# Patient Record
Sex: FEMALE | Race: WHITE | Employment: OTHER | URBAN - METROPOLITAN AREA
[De-identification: names, ages, dates, MRNs, and addresses within clinical notes are randomized per-mention and may not be internally consistent; named-entity substitution may affect disease eponyms.]

---

## 2018-03-22 ENCOUNTER — APPOINTMENT (OUTPATIENT)
Dept: GENERAL RADIOLOGY | Age: 53
DRG: 137 | End: 2018-03-22
Attending: INTERNAL MEDICINE
Payer: COMMERCIAL

## 2018-03-22 ENCOUNTER — HOSPITAL ENCOUNTER (INPATIENT)
Age: 53
LOS: 8 days | Discharge: HOME | DRG: 137 | End: 2018-03-30
Attending: INTERNAL MEDICINE | Admitting: INTERNAL MEDICINE
Payer: COMMERCIAL

## 2018-03-22 PROBLEM — J18.9 PNEUMONIA: Status: ACTIVE | Noted: 2018-03-22

## 2018-03-22 LAB
BACTERIA: ABNORMAL /HPF
BILIRUBIN URINE: ABNORMAL
BLOOD, URINE: NEGATIVE
CHOLESTEROL, TOTAL: 191 MG/DL (ref 0–199)
CLARITY: CLEAR
COLOR: YELLOW
GLUCOSE URINE: NEGATIVE MG/DL
HDLC SERPL-MCNC: 44 MG/DL
INFLUENZA A BY PCR: DETECTED
INFLUENZA B BY PCR: NOT DETECTED
KETONES, URINE: ABNORMAL MG/DL
LDL CHOLESTEROL CALCULATED: 108 MG/DL (ref 0–99)
LEUKOCYTE ESTERASE, URINE: NEGATIVE
MAGNESIUM: 2.2 MG/DL (ref 1.6–2.6)
NITRITE, URINE: NEGATIVE
PH UA: 5 (ref 5–9)
PHOSPHORUS: 2.9 MG/DL (ref 2.5–4.5)
PROTEIN UA: 30 MG/DL
RBC UA: ABNORMAL /HPF (ref 0–2)
SPECIFIC GRAVITY UA: 1.02 (ref 1–1.03)
STREP GRP A PCR: NEGATIVE
TRIGL SERPL-MCNC: 195 MG/DL (ref 0–149)
TROPONIN: <0.01 NG/ML (ref 0–0.03)
TSH SERPL DL<=0.05 MIU/L-ACNC: 2.8 UIU/ML (ref 0.27–4.2)
UROBILINOGEN, URINE: 0.2 E.U./DL
VLDLC SERPL CALC-MCNC: 39 MG/DL
WBC UA: ABNORMAL /HPF (ref 0–5)

## 2018-03-22 PROCEDURE — 87581 M.PNEUMON DNA AMP PROBE: CPT

## 2018-03-22 PROCEDURE — 94640 AIRWAY INHALATION TREATMENT: CPT

## 2018-03-22 PROCEDURE — 87880 STREP A ASSAY W/OPTIC: CPT

## 2018-03-22 PROCEDURE — 87503 INFLUENZA DNA AMP PROB ADDL: CPT

## 2018-03-22 PROCEDURE — 87501 INFLUENZA DNA AMP PROB 1+: CPT

## 2018-03-22 PROCEDURE — 80061 LIPID PANEL: CPT

## 2018-03-22 PROCEDURE — 6370000000 HC RX 637 (ALT 250 FOR IP): Performed by: INTERNAL MEDICINE

## 2018-03-22 PROCEDURE — 6360000002 HC RX W HCPCS: Performed by: INTERNAL MEDICINE

## 2018-03-22 PROCEDURE — 87040 BLOOD CULTURE FOR BACTERIA: CPT

## 2018-03-22 PROCEDURE — 84484 ASSAY OF TROPONIN QUANT: CPT

## 2018-03-22 PROCEDURE — 87486 CHLMYD PNEUM DNA AMP PROBE: CPT

## 2018-03-22 PROCEDURE — 83735 ASSAY OF MAGNESIUM: CPT

## 2018-03-22 PROCEDURE — 87450 HC DIRECT STREP B ANTIGEN: CPT

## 2018-03-22 PROCEDURE — 81001 URINALYSIS AUTO W/SCOPE: CPT

## 2018-03-22 PROCEDURE — 84100 ASSAY OF PHOSPHORUS: CPT

## 2018-03-22 PROCEDURE — 87502 INFLUENZA DNA AMP PROBE: CPT

## 2018-03-22 PROCEDURE — 83036 HEMOGLOBIN GLYCOSYLATED A1C: CPT

## 2018-03-22 PROCEDURE — 1200000000 HC SEMI PRIVATE

## 2018-03-22 PROCEDURE — 87798 DETECT AGENT NOS DNA AMP: CPT

## 2018-03-22 PROCEDURE — 36415 COLL VENOUS BLD VENIPUNCTURE: CPT

## 2018-03-22 PROCEDURE — 80053 COMPREHEN METABOLIC PANEL: CPT

## 2018-03-22 PROCEDURE — 71046 X-RAY EXAM CHEST 2 VIEWS: CPT

## 2018-03-22 PROCEDURE — 84443 ASSAY THYROID STIM HORMONE: CPT

## 2018-03-22 RX ORDER — ACETAMINOPHEN 325 MG/1
650 TABLET ORAL EVERY 4 HOURS PRN
Status: DISCONTINUED | OUTPATIENT
Start: 2018-03-22 | End: 2018-03-30 | Stop reason: HOSPADM

## 2018-03-22 RX ORDER — DEXTROSE MONOHYDRATE 25 G/50ML
12.5 INJECTION, SOLUTION INTRAVENOUS PRN
Status: DISCONTINUED | OUTPATIENT
Start: 2018-03-22 | End: 2018-03-30 | Stop reason: HOSPADM

## 2018-03-22 RX ORDER — BENZONATATE 100 MG/1
100 CAPSULE ORAL 3 TIMES DAILY PRN
Status: DISCONTINUED | OUTPATIENT
Start: 2018-03-22 | End: 2018-03-30 | Stop reason: HOSPADM

## 2018-03-22 RX ORDER — SODIUM CHLORIDE 0.9 % (FLUSH) 0.9 %
10 SYRINGE (ML) INJECTION PRN
Status: DISCONTINUED | OUTPATIENT
Start: 2018-03-22 | End: 2018-03-30 | Stop reason: HOSPADM

## 2018-03-22 RX ORDER — NICOTINE POLACRILEX 4 MG
15 LOZENGE BUCCAL PRN
Status: DISCONTINUED | OUTPATIENT
Start: 2018-03-22 | End: 2018-03-30 | Stop reason: HOSPADM

## 2018-03-22 RX ORDER — OLOPATADINE HYDROCHLORIDE 1 MG/ML
1 SOLUTION/ DROPS OPHTHALMIC 2 TIMES DAILY
COMMUNITY
End: 2020-09-23

## 2018-03-22 RX ORDER — AMOXICILLIN 500 MG/1
500 CAPSULE ORAL 3 TIMES DAILY
Status: ON HOLD | COMMUNITY
End: 2018-03-30 | Stop reason: HOSPADM

## 2018-03-22 RX ORDER — IPRATROPIUM BROMIDE AND ALBUTEROL SULFATE 2.5; .5 MG/3ML; MG/3ML
1 SOLUTION RESPIRATORY (INHALATION)
Status: DISCONTINUED | OUTPATIENT
Start: 2018-03-22 | End: 2018-03-30 | Stop reason: HOSPADM

## 2018-03-22 RX ORDER — DEXTROSE MONOHYDRATE 50 MG/ML
100 INJECTION, SOLUTION INTRAVENOUS PRN
Status: DISCONTINUED | OUTPATIENT
Start: 2018-03-22 | End: 2018-03-30 | Stop reason: HOSPADM

## 2018-03-22 RX ORDER — DIPHENHYDRAMINE HYDROCHLORIDE 50 MG/ML
25 INJECTION INTRAMUSCULAR; INTRAVENOUS PRN
Status: DISCONTINUED | OUTPATIENT
Start: 2018-03-22 | End: 2018-03-30 | Stop reason: HOSPADM

## 2018-03-22 RX ORDER — KETOCONAZOLE 20 MG/G
CREAM TOPICAL 2 TIMES DAILY
Status: ON HOLD | COMMUNITY
End: 2018-03-30 | Stop reason: HOSPADM

## 2018-03-22 RX ORDER — SODIUM CHLORIDE 0.9 % (FLUSH) 0.9 %
10 SYRINGE (ML) INJECTION EVERY 12 HOURS SCHEDULED
Status: DISCONTINUED | OUTPATIENT
Start: 2018-03-22 | End: 2018-03-30 | Stop reason: HOSPADM

## 2018-03-22 RX ORDER — OXYCODONE AND ACETAMINOPHEN 10; 325 MG/1; MG/1
1 TABLET ORAL ONCE
Status: COMPLETED | OUTPATIENT
Start: 2018-03-22 | End: 2018-03-23

## 2018-03-22 RX ORDER — BUDESONIDE 0.25 MG/2ML
250 INHALANT ORAL 2 TIMES DAILY
Status: DISCONTINUED | OUTPATIENT
Start: 2018-03-22 | End: 2018-03-30 | Stop reason: HOSPADM

## 2018-03-22 RX ADMIN — IPRATROPIUM BROMIDE AND ALBUTEROL SULFATE 1 AMPULE: .5; 3 SOLUTION RESPIRATORY (INHALATION) at 21:45

## 2018-03-22 ASSESSMENT — PAIN DESCRIPTION - FREQUENCY: FREQUENCY: INTERMITTENT

## 2018-03-22 ASSESSMENT — PAIN DESCRIPTION - DESCRIPTORS: DESCRIPTORS: ACHING;DISCOMFORT;TENDER

## 2018-03-22 ASSESSMENT — PAIN DESCRIPTION - ONSET: ONSET: ON-GOING

## 2018-03-22 ASSESSMENT — PAIN SCALES - GENERAL: PAINLEVEL_OUTOF10: 5

## 2018-03-22 ASSESSMENT — PAIN DESCRIPTION - PROGRESSION: CLINICAL_PROGRESSION: NOT CHANGED

## 2018-03-22 ASSESSMENT — PAIN DESCRIPTION - ORIENTATION: ORIENTATION: RIGHT

## 2018-03-22 ASSESSMENT — PAIN DESCRIPTION - LOCATION: LOCATION: ABDOMEN;ARM;LEG

## 2018-03-22 ASSESSMENT — PAIN DESCRIPTION - PAIN TYPE: TYPE: CHRONIC PAIN

## 2018-03-23 ENCOUNTER — APPOINTMENT (OUTPATIENT)
Dept: GENERAL RADIOLOGY | Age: 53
DRG: 137 | End: 2018-03-23
Attending: INTERNAL MEDICINE
Payer: COMMERCIAL

## 2018-03-23 ENCOUNTER — APPOINTMENT (OUTPATIENT)
Dept: CT IMAGING | Age: 53
DRG: 137 | End: 2018-03-23
Attending: INTERNAL MEDICINE
Payer: COMMERCIAL

## 2018-03-23 PROBLEM — J10.1 INFLUENZA A: Status: ACTIVE | Noted: 2018-03-23

## 2018-03-23 LAB
ALBUMIN SERPL-MCNC: 4.1 G/DL (ref 3.5–5.2)
ALP BLD-CCNC: 70 U/L (ref 35–104)
ALT SERPL-CCNC: 24 U/L (ref 0–32)
ANION GAP SERPL CALCULATED.3IONS-SCNC: 16 MMOL/L (ref 7–16)
AST SERPL-CCNC: 20 U/L (ref 0–31)
BASOPHILS ABSOLUTE: 0.06 E9/L (ref 0–0.2)
BASOPHILS RELATIVE PERCENT: 1.1 % (ref 0–2)
BILIRUB SERPL-MCNC: <0.2 MG/DL (ref 0–1.2)
BUN BLDV-MCNC: 15 MG/DL (ref 6–20)
CALCIUM SERPL-MCNC: 9.1 MG/DL (ref 8.6–10.2)
CHLORIDE BLD-SCNC: 100 MMOL/L (ref 98–107)
CO2: 26 MMOL/L (ref 22–29)
CREAT SERPL-MCNC: 0.8 MG/DL (ref 0.5–1)
EOSINOPHILS ABSOLUTE: 0.09 E9/L (ref 0.05–0.5)
EOSINOPHILS RELATIVE PERCENT: 1.7 % (ref 0–6)
FILM ARRAY ADENOVIRUS: ABNORMAL
FILM ARRAY BORDETELLA PERTUSSIS: ABNORMAL
FILM ARRAY CHLAMYDOPHILIA PNEUMONIAE: ABNORMAL
FILM ARRAY CORONAVIRUS 229E: ABNORMAL
FILM ARRAY CORONAVIRUS HKU1: ABNORMAL
FILM ARRAY CORONAVIRUS NL63: ABNORMAL
FILM ARRAY CORONAVIRUS OC43: ABNORMAL
FILM ARRAY INFLUENZA A VIRUS H3: ABNORMAL
FILM ARRAY INFLUENZA B: ABNORMAL
FILM ARRAY METAPNEUMOVIRUS: ABNORMAL
FILM ARRAY MYCOPLASMA PNEUMONIAE: ABNORMAL
FILM ARRAY PARAINFLUENZA VIRUS 1: ABNORMAL
FILM ARRAY PARAINFLUENZA VIRUS 2: ABNORMAL
FILM ARRAY PARAINFLUENZA VIRUS 3: ABNORMAL
FILM ARRAY PARAINFLUENZA VIRUS 4: ABNORMAL
FILM ARRAY RESPIRATORY SYNCITIAL VIRUS: ABNORMAL
FILM ARRAY RHINOVIRUS/ENTEROVIRUS: ABNORMAL
GFR AFRICAN AMERICAN: >60
GFR NON-AFRICAN AMERICAN: >60 ML/MIN/1.73
GLUCOSE BLD-MCNC: 95 MG/DL (ref 74–109)
HBA1C MFR BLD: 5.8 % (ref 4.8–5.9)
HCT VFR BLD CALC: 39.2 % (ref 34–48)
HEMOGLOBIN: 12.4 G/DL (ref 11.5–15.5)
IMMATURE GRANULOCYTES #: 0.02 E9/L
IMMATURE GRANULOCYTES %: 0.4 % (ref 0–5)
L. PNEUMOPHILA SEROGP 1 UR AG: NORMAL
LYMPHOCYTES ABSOLUTE: 2.57 E9/L (ref 1.5–4)
LYMPHOCYTES RELATIVE PERCENT: 47.7 % (ref 20–42)
MCH RBC QN AUTO: 30.4 PG (ref 26–35)
MCHC RBC AUTO-ENTMCNC: 31.6 % (ref 32–34.5)
MCV RBC AUTO: 96.1 FL (ref 80–99.9)
MONOCYTES ABSOLUTE: 0.58 E9/L (ref 0.1–0.95)
MONOCYTES RELATIVE PERCENT: 10.8 % (ref 2–12)
NEUTROPHILS ABSOLUTE: 2.07 E9/L (ref 1.8–7.3)
NEUTROPHILS RELATIVE PERCENT: 38.3 % (ref 43–80)
ORGANISM: ABNORMAL
PDW BLD-RTO: 14.6 FL (ref 11.5–15)
PLATELET # BLD: 215 E9/L (ref 130–450)
PMV BLD AUTO: 11.2 FL (ref 7–12)
POTASSIUM SERPL-SCNC: 3.8 MMOL/L (ref 3.5–5)
RBC # BLD: 4.08 E12/L (ref 3.5–5.5)
SODIUM BLD-SCNC: 142 MMOL/L (ref 132–146)
STREP PNEUMONIAE ANTIGEN, URINE: NORMAL
TOTAL PROTEIN: 7.6 G/DL (ref 6.4–8.3)
WBC # BLD: 5.4 E9/L (ref 4.5–11.5)

## 2018-03-23 PROCEDURE — 6360000002 HC RX W HCPCS: Performed by: INTERNAL MEDICINE

## 2018-03-23 PROCEDURE — 85025 COMPLETE CBC W/AUTO DIFF WBC: CPT

## 2018-03-23 PROCEDURE — 36415 COLL VENOUS BLD VENIPUNCTURE: CPT

## 2018-03-23 PROCEDURE — 2580000003 HC RX 258: Performed by: INTERNAL MEDICINE

## 2018-03-23 PROCEDURE — 6370000000 HC RX 637 (ALT 250 FOR IP): Performed by: INTERNAL MEDICINE

## 2018-03-23 PROCEDURE — 74230 X-RAY XM SWLNG FUNCJ C+: CPT

## 2018-03-23 PROCEDURE — 70491 CT SOFT TISSUE NECK W/DYE: CPT

## 2018-03-23 PROCEDURE — 2500000003 HC RX 250 WO HCPCS: Performed by: INTERNAL MEDICINE

## 2018-03-23 PROCEDURE — 92611 MOTION FLUOROSCOPY/SWALLOW: CPT | Performed by: SPEECH-LANGUAGE PATHOLOGIST

## 2018-03-23 PROCEDURE — 6360000004 HC RX CONTRAST MEDICATION: Performed by: RADIOLOGY

## 2018-03-23 PROCEDURE — 94640 AIRWAY INHALATION TREATMENT: CPT

## 2018-03-23 PROCEDURE — 1200000000 HC SEMI PRIVATE

## 2018-03-23 PROCEDURE — 92526 ORAL FUNCTION THERAPY: CPT | Performed by: SPEECH-LANGUAGE PATHOLOGIST

## 2018-03-23 RX ORDER — SODIUM CHLORIDE 9 MG/ML
INJECTION, SOLUTION INTRAVENOUS CONTINUOUS
Status: DISCONTINUED | OUTPATIENT
Start: 2018-03-23 | End: 2018-03-23

## 2018-03-23 RX ORDER — OSELTAMIVIR PHOSPHATE 75 MG/1
75 CAPSULE ORAL 2 TIMES DAILY
Status: COMPLETED | OUTPATIENT
Start: 2018-03-23 | End: 2018-03-28

## 2018-03-23 RX ORDER — OSELTAMIVIR PHOSPHATE 6 MG/ML
75 FOR SUSPENSION ORAL ONCE
Status: COMPLETED | OUTPATIENT
Start: 2018-03-23 | End: 2018-03-23

## 2018-03-23 RX ORDER — METOPROLOL TARTRATE 50 MG/1
50 TABLET, FILM COATED ORAL EVERY 8 HOURS
Status: DISCONTINUED | OUTPATIENT
Start: 2018-03-23 | End: 2018-03-30 | Stop reason: HOSPADM

## 2018-03-23 RX ORDER — ARIPIPRAZOLE 10 MG/1
20 TABLET ORAL DAILY
Status: DISCONTINUED | OUTPATIENT
Start: 2018-03-23 | End: 2018-03-30 | Stop reason: HOSPADM

## 2018-03-23 RX ORDER — CLONIDINE HYDROCHLORIDE 0.2 MG/1
0.2 TABLET ORAL NIGHTLY
Status: DISCONTINUED | OUTPATIENT
Start: 2018-03-23 | End: 2018-03-30 | Stop reason: HOSPADM

## 2018-03-23 RX ORDER — ALPRAZOLAM 1 MG/1
2 TABLET ORAL 3 TIMES DAILY PRN
Status: DISCONTINUED | OUTPATIENT
Start: 2018-03-23 | End: 2018-03-30 | Stop reason: HOSPADM

## 2018-03-23 RX ORDER — MONTELUKAST SODIUM 10 MG/1
10 TABLET ORAL NIGHTLY
Status: DISCONTINUED | OUTPATIENT
Start: 2018-03-23 | End: 2018-03-30 | Stop reason: HOSPADM

## 2018-03-23 RX ORDER — OXYCODONE AND ACETAMINOPHEN 10; 325 MG/1; MG/1
1 TABLET ORAL EVERY 6 HOURS PRN
Status: DISCONTINUED | OUTPATIENT
Start: 2018-03-23 | End: 2018-03-30 | Stop reason: HOSPADM

## 2018-03-23 RX ORDER — METHYLPREDNISOLONE SODIUM SUCCINATE 125 MG/2ML
80 INJECTION, POWDER, LYOPHILIZED, FOR SOLUTION INTRAMUSCULAR; INTRAVENOUS EVERY 6 HOURS
Status: DISCONTINUED | OUTPATIENT
Start: 2018-03-23 | End: 2018-03-26

## 2018-03-23 RX ORDER — SODIUM CHLORIDE AND POTASSIUM CHLORIDE .9; .15 G/100ML; G/100ML
SOLUTION INTRAVENOUS CONTINUOUS
Status: DISCONTINUED | OUTPATIENT
Start: 2018-03-23 | End: 2018-03-28

## 2018-03-23 RX ADMIN — POTASSIUM CHLORIDE AND SODIUM CHLORIDE: 900; 150 INJECTION, SOLUTION INTRAVENOUS at 09:35

## 2018-03-23 RX ADMIN — METHYLPREDNISOLONE SODIUM SUCCINATE 80 MG: 125 INJECTION, POWDER, FOR SOLUTION INTRAMUSCULAR; INTRAVENOUS at 21:29

## 2018-03-23 RX ADMIN — MONTELUKAST SODIUM 10 MG: 10 TABLET, FILM COATED ORAL at 01:24

## 2018-03-23 RX ADMIN — IPRATROPIUM BROMIDE AND ALBUTEROL SULFATE 1 AMPULE: .5; 3 SOLUTION RESPIRATORY (INHALATION) at 17:14

## 2018-03-23 RX ADMIN — OXYCODONE HYDROCHLORIDE AND ACETAMINOPHEN 1 TABLET: 10; 325 TABLET ORAL at 15:40

## 2018-03-23 RX ADMIN — OXYCODONE HYDROCHLORIDE AND ACETAMINOPHEN 1 TABLET: 10; 325 TABLET ORAL at 01:24

## 2018-03-23 RX ADMIN — TAZOBACTAM SODIUM AND PIPERACILLIN SODIUM 3.38 G: 375; 3 INJECTION, SOLUTION INTRAVENOUS at 09:35

## 2018-03-23 RX ADMIN — TAZOBACTAM SODIUM AND PIPERACILLIN SODIUM 3.38 G: 375; 3 INJECTION, SOLUTION INTRAVENOUS at 01:27

## 2018-03-23 RX ADMIN — VANCOMYCIN HYDROCHLORIDE 1500 MG: 1 INJECTION, POWDER, LYOPHILIZED, FOR SOLUTION INTRAVENOUS at 01:29

## 2018-03-23 RX ADMIN — BUDESONIDE 250 MCG: 0.25 SUSPENSION RESPIRATORY (INHALATION) at 06:17

## 2018-03-23 RX ADMIN — METHYLPREDNISOLONE SODIUM SUCCINATE 80 MG: 125 INJECTION, POWDER, FOR SOLUTION INTRAMUSCULAR; INTRAVENOUS at 09:34

## 2018-03-23 RX ADMIN — OSELTAMIVIR PHOSPHATE 75 MG: 6 POWDER, FOR SUSPENSION ORAL at 01:24

## 2018-03-23 RX ADMIN — METOPROLOL TARTRATE 50 MG: 25 TABLET ORAL at 01:24

## 2018-03-23 RX ADMIN — CLONIDINE HYDROCHLORIDE 0.2 MG: 0.2 TABLET ORAL at 01:24

## 2018-03-23 RX ADMIN — TAZOBACTAM SODIUM AND PIPERACILLIN SODIUM 3.38 G: 375; 3 INJECTION, SOLUTION INTRAVENOUS at 17:31

## 2018-03-23 RX ADMIN — Medication 10 ML: at 01:55

## 2018-03-23 RX ADMIN — VANCOMYCIN HYDROCHLORIDE 1250 MG: 10 INJECTION, POWDER, LYOPHILIZED, FOR SOLUTION INTRAVENOUS at 14:10

## 2018-03-23 RX ADMIN — SODIUM CHLORIDE: 9 INJECTION, SOLUTION INTRAVENOUS at 01:29

## 2018-03-23 RX ADMIN — BUSPIRONE HYDROCHLORIDE 15 MG: 10 TABLET ORAL at 20:23

## 2018-03-23 RX ADMIN — IPRATROPIUM BROMIDE AND ALBUTEROL SULFATE 1 AMPULE: .5; 3 SOLUTION RESPIRATORY (INHALATION) at 09:46

## 2018-03-23 RX ADMIN — BUDESONIDE 250 MCG: 0.25 SUSPENSION RESPIRATORY (INHALATION) at 17:15

## 2018-03-23 RX ADMIN — METHYLPREDNISOLONE SODIUM SUCCINATE 80 MG: 125 INJECTION, POWDER, FOR SOLUTION INTRAMUSCULAR; INTRAVENOUS at 15:39

## 2018-03-23 RX ADMIN — MONTELUKAST SODIUM 10 MG: 10 TABLET, FILM COATED ORAL at 20:23

## 2018-03-23 RX ADMIN — IOPAMIDOL 80 ML: 755 INJECTION, SOLUTION INTRAVENOUS at 13:29

## 2018-03-23 RX ADMIN — OSELTAMIVIR PHOSPHATE 75 MG: 75 CAPSULE ORAL at 17:31

## 2018-03-23 RX ADMIN — CLONIDINE HYDROCHLORIDE 0.2 MG: 0.2 TABLET ORAL at 20:23

## 2018-03-23 RX ADMIN — IPRATROPIUM BROMIDE AND ALBUTEROL SULFATE 1 AMPULE: .5; 3 SOLUTION RESPIRATORY (INHALATION) at 06:17

## 2018-03-23 RX ADMIN — VORTIOXETINE 20 MG: 10 TABLET, FILM COATED ORAL at 15:41

## 2018-03-23 RX ADMIN — ARIPIPRAZOLE 20 MG: 10 TABLET ORAL at 15:40

## 2018-03-23 RX ADMIN — METOPROLOL TARTRATE 50 MG: 25 TABLET ORAL at 15:40

## 2018-03-23 ASSESSMENT — PAIN SCALES - GENERAL
PAINLEVEL_OUTOF10: 0
PAINLEVEL_OUTOF10: 0
PAINLEVEL_OUTOF10: 8
PAINLEVEL_OUTOF10: 6

## 2018-03-23 ASSESSMENT — PAIN DESCRIPTION - DIRECTION: RADIATING_TOWARDS: RT SIDE OF BODY

## 2018-03-23 ASSESSMENT — PAIN DESCRIPTION - DESCRIPTORS
DESCRIPTORS: ACHING;CRAMPING;DISCOMFORT
DESCRIPTORS: ACHING;DULL;DISCOMFORT

## 2018-03-23 ASSESSMENT — PAIN DESCRIPTION - ORIENTATION
ORIENTATION: RIGHT;LEFT
ORIENTATION: RIGHT

## 2018-03-23 ASSESSMENT — PAIN DESCRIPTION - PAIN TYPE
TYPE: ACUTE PAIN
TYPE: CHRONIC PAIN

## 2018-03-23 ASSESSMENT — PAIN DESCRIPTION - LOCATION
LOCATION: ABDOMEN;BACK;CHEST
LOCATION: ABDOMEN;ARM;LEG

## 2018-03-23 ASSESSMENT — PAIN DESCRIPTION - ONSET
ONSET: ON-GOING
ONSET: ON-GOING

## 2018-03-23 ASSESSMENT — PAIN DESCRIPTION - FREQUENCY
FREQUENCY: CONTINUOUS
FREQUENCY: INTERMITTENT

## 2018-03-23 ASSESSMENT — PAIN DESCRIPTION - PROGRESSION
CLINICAL_PROGRESSION: NOT CHANGED
CLINICAL_PROGRESSION: NOT CHANGED

## 2018-03-24 LAB
ANION GAP SERPL CALCULATED.3IONS-SCNC: 12 MMOL/L (ref 7–16)
BUN BLDV-MCNC: 9 MG/DL (ref 6–20)
CALCIUM SERPL-MCNC: 9.1 MG/DL (ref 8.6–10.2)
CHLORIDE BLD-SCNC: 101 MMOL/L (ref 98–107)
CO2: 26 MMOL/L (ref 22–29)
CREAT SERPL-MCNC: 0.7 MG/DL (ref 0.5–1)
FOLATE: >20 NG/ML (ref 4.8–24.2)
GFR AFRICAN AMERICAN: >60
GFR NON-AFRICAN AMERICAN: >60 ML/MIN/1.73
GLUCOSE BLD-MCNC: 195 MG/DL (ref 74–109)
HCT VFR BLD CALC: 40.4 % (ref 34–48)
HEMOGLOBIN: 12.9 G/DL (ref 11.5–15.5)
MCH RBC QN AUTO: 29.8 PG (ref 26–35)
MCHC RBC AUTO-ENTMCNC: 31.9 % (ref 32–34.5)
MCV RBC AUTO: 93.3 FL (ref 80–99.9)
PDW BLD-RTO: 14.5 FL (ref 11.5–15)
PLATELET # BLD: 220 E9/L (ref 130–450)
PMV BLD AUTO: 11.3 FL (ref 7–12)
POTASSIUM SERPL-SCNC: 4.5 MMOL/L (ref 3.5–5)
RBC # BLD: 4.33 E12/L (ref 3.5–5.5)
SODIUM BLD-SCNC: 139 MMOL/L (ref 132–146)
T4 FREE: 1.38 NG/DL (ref 0.93–1.7)
TSH SERPL DL<=0.05 MIU/L-ACNC: 0.26 UIU/ML (ref 0.27–4.2)
VITAMIN B-12: 1072 PG/ML (ref 211–946)
WBC # BLD: 4.1 E9/L (ref 4.5–11.5)

## 2018-03-24 PROCEDURE — 80048 BASIC METABOLIC PNL TOTAL CA: CPT

## 2018-03-24 PROCEDURE — 85027 COMPLETE CBC AUTOMATED: CPT

## 2018-03-24 PROCEDURE — 6370000000 HC RX 637 (ALT 250 FOR IP): Performed by: INTERNAL MEDICINE

## 2018-03-24 PROCEDURE — 82746 ASSAY OF FOLIC ACID SERUM: CPT

## 2018-03-24 PROCEDURE — 36415 COLL VENOUS BLD VENIPUNCTURE: CPT

## 2018-03-24 PROCEDURE — 6360000002 HC RX W HCPCS: Performed by: INTERNAL MEDICINE

## 2018-03-24 PROCEDURE — 2580000003 HC RX 258: Performed by: INTERNAL MEDICINE

## 2018-03-24 PROCEDURE — 2500000003 HC RX 250 WO HCPCS: Performed by: INTERNAL MEDICINE

## 2018-03-24 PROCEDURE — 1200000000 HC SEMI PRIVATE

## 2018-03-24 PROCEDURE — 94640 AIRWAY INHALATION TREATMENT: CPT

## 2018-03-24 PROCEDURE — 84439 ASSAY OF FREE THYROXINE: CPT

## 2018-03-24 PROCEDURE — 84443 ASSAY THYROID STIM HORMONE: CPT

## 2018-03-24 PROCEDURE — 82607 VITAMIN B-12: CPT

## 2018-03-24 RX ORDER — POLYETHYLENE GLYCOL 3350 17 G/17G
17 POWDER, FOR SOLUTION ORAL DAILY
Status: DISCONTINUED | OUTPATIENT
Start: 2018-03-24 | End: 2018-03-30 | Stop reason: HOSPADM

## 2018-03-24 RX ADMIN — MONTELUKAST SODIUM 10 MG: 10 TABLET, FILM COATED ORAL at 20:49

## 2018-03-24 RX ADMIN — TAZOBACTAM SODIUM AND PIPERACILLIN SODIUM 3.38 G: 375; 3 INJECTION, SOLUTION INTRAVENOUS at 18:00

## 2018-03-24 RX ADMIN — POTASSIUM CHLORIDE AND SODIUM CHLORIDE: 900; 150 INJECTION, SOLUTION INTRAVENOUS at 07:00

## 2018-03-24 RX ADMIN — OXYCODONE HYDROCHLORIDE AND ACETAMINOPHEN 1 TABLET: 10; 325 TABLET ORAL at 14:28

## 2018-03-24 RX ADMIN — POLYETHYLENE GLYCOL 3350 17 G: 17 POWDER, FOR SOLUTION ORAL at 10:01

## 2018-03-24 RX ADMIN — METOPROLOL TARTRATE 50 MG: 25 TABLET ORAL at 16:16

## 2018-03-24 RX ADMIN — IPRATROPIUM BROMIDE AND ALBUTEROL SULFATE 1 AMPULE: .5; 3 SOLUTION RESPIRATORY (INHALATION) at 17:07

## 2018-03-24 RX ADMIN — METOPROLOL TARTRATE 50 MG: 25 TABLET ORAL at 00:57

## 2018-03-24 RX ADMIN — ARIPIPRAZOLE 20 MG: 10 TABLET ORAL at 09:42

## 2018-03-24 RX ADMIN — METHYLPREDNISOLONE SODIUM SUCCINATE 80 MG: 125 INJECTION, POWDER, FOR SOLUTION INTRAMUSCULAR; INTRAVENOUS at 16:16

## 2018-03-24 RX ADMIN — CLONIDINE HYDROCHLORIDE 0.2 MG: 0.2 TABLET ORAL at 20:49

## 2018-03-24 RX ADMIN — BUDESONIDE 250 MCG: 0.25 SUSPENSION RESPIRATORY (INHALATION) at 06:16

## 2018-03-24 RX ADMIN — METOPROLOL TARTRATE 50 MG: 25 TABLET ORAL at 09:42

## 2018-03-24 RX ADMIN — BUSPIRONE HYDROCHLORIDE 15 MG: 10 TABLET ORAL at 09:42

## 2018-03-24 RX ADMIN — IPRATROPIUM BROMIDE AND ALBUTEROL SULFATE 1 AMPULE: .5; 3 SOLUTION RESPIRATORY (INHALATION) at 13:30

## 2018-03-24 RX ADMIN — VORTIOXETINE 20 MG: 10 TABLET, FILM COATED ORAL at 09:42

## 2018-03-24 RX ADMIN — Medication 10 ML: at 22:03

## 2018-03-24 RX ADMIN — BENZONATATE 100 MG: 100 CAPSULE ORAL at 14:28

## 2018-03-24 RX ADMIN — IPRATROPIUM BROMIDE AND ALBUTEROL SULFATE 1 AMPULE: .5; 3 SOLUTION RESPIRATORY (INHALATION) at 06:15

## 2018-03-24 RX ADMIN — OXYCODONE HYDROCHLORIDE AND ACETAMINOPHEN 1 TABLET: 10; 325 TABLET ORAL at 20:54

## 2018-03-24 RX ADMIN — ALPRAZOLAM 2 MG: 1 TABLET ORAL at 20:54

## 2018-03-24 RX ADMIN — TAZOBACTAM SODIUM AND PIPERACILLIN SODIUM 3.38 G: 375; 3 INJECTION, SOLUTION INTRAVENOUS at 09:42

## 2018-03-24 RX ADMIN — METHYLPREDNISOLONE SODIUM SUCCINATE 80 MG: 125 INJECTION, POWDER, FOR SOLUTION INTRAMUSCULAR; INTRAVENOUS at 09:43

## 2018-03-24 RX ADMIN — METHYLPREDNISOLONE SODIUM SUCCINATE 80 MG: 125 INJECTION, POWDER, FOR SOLUTION INTRAMUSCULAR; INTRAVENOUS at 04:03

## 2018-03-24 RX ADMIN — TAZOBACTAM SODIUM AND PIPERACILLIN SODIUM 3.38 G: 375; 3 INJECTION, SOLUTION INTRAVENOUS at 01:55

## 2018-03-24 RX ADMIN — VANCOMYCIN HYDROCHLORIDE 1250 MG: 10 INJECTION, POWDER, LYOPHILIZED, FOR SOLUTION INTRAVENOUS at 14:25

## 2018-03-24 RX ADMIN — METHYLPREDNISOLONE SODIUM SUCCINATE 80 MG: 125 INJECTION, POWDER, FOR SOLUTION INTRAMUSCULAR; INTRAVENOUS at 20:50

## 2018-03-24 RX ADMIN — BUDESONIDE 250 MCG: 0.25 SUSPENSION RESPIRATORY (INHALATION) at 17:08

## 2018-03-24 RX ADMIN — BUSPIRONE HYDROCHLORIDE 15 MG: 10 TABLET ORAL at 20:49

## 2018-03-24 RX ADMIN — VANCOMYCIN HYDROCHLORIDE 1250 MG: 10 INJECTION, POWDER, LYOPHILIZED, FOR SOLUTION INTRAVENOUS at 00:57

## 2018-03-24 RX ADMIN — OSELTAMIVIR PHOSPHATE 75 MG: 75 CAPSULE ORAL at 18:59

## 2018-03-24 RX ADMIN — IPRATROPIUM BROMIDE AND ALBUTEROL SULFATE 1 AMPULE: .5; 3 SOLUTION RESPIRATORY (INHALATION) at 10:11

## 2018-03-24 RX ADMIN — BENZONATATE 100 MG: 100 CAPSULE ORAL at 20:54

## 2018-03-24 RX ADMIN — OSELTAMIVIR PHOSPHATE 75 MG: 75 CAPSULE ORAL at 08:11

## 2018-03-24 ASSESSMENT — PAIN SCALES - GENERAL
PAINLEVEL_OUTOF10: 0
PAINLEVEL_OUTOF10: 4
PAINLEVEL_OUTOF10: 7
PAINLEVEL_OUTOF10: 0

## 2018-03-25 ENCOUNTER — APPOINTMENT (OUTPATIENT)
Dept: CT IMAGING | Age: 53
DRG: 137 | End: 2018-03-25
Attending: INTERNAL MEDICINE
Payer: COMMERCIAL

## 2018-03-25 LAB
ANION GAP SERPL CALCULATED.3IONS-SCNC: 14 MMOL/L (ref 7–16)
BUN BLDV-MCNC: 10 MG/DL (ref 6–20)
CALCIUM SERPL-MCNC: 9 MG/DL (ref 8.6–10.2)
CHLORIDE BLD-SCNC: 102 MMOL/L (ref 98–107)
CO2: 25 MMOL/L (ref 22–29)
CREAT SERPL-MCNC: 0.7 MG/DL (ref 0.5–1)
GFR AFRICAN AMERICAN: >60
GFR NON-AFRICAN AMERICAN: >60 ML/MIN/1.73
GLUCOSE BLD-MCNC: 161 MG/DL (ref 74–109)
HCT VFR BLD CALC: 37.6 % (ref 34–48)
HEMOGLOBIN: 12.3 G/DL (ref 11.5–15.5)
MCH RBC QN AUTO: 30.4 PG (ref 26–35)
MCHC RBC AUTO-ENTMCNC: 32.7 % (ref 32–34.5)
MCV RBC AUTO: 92.8 FL (ref 80–99.9)
PDW BLD-RTO: 14.6 FL (ref 11.5–15)
PLATELET # BLD: 225 E9/L (ref 130–450)
PMV BLD AUTO: 11.6 FL (ref 7–12)
POTASSIUM SERPL-SCNC: 4.1 MMOL/L (ref 3.5–5)
RBC # BLD: 4.05 E12/L (ref 3.5–5.5)
SODIUM BLD-SCNC: 141 MMOL/L (ref 132–146)
VANCOMYCIN TROUGH: 18.6 MCG/ML (ref 5–16)
WBC # BLD: 11.8 E9/L (ref 4.5–11.5)

## 2018-03-25 PROCEDURE — 82785 ASSAY OF IGE: CPT

## 2018-03-25 PROCEDURE — 85027 COMPLETE CBC AUTOMATED: CPT

## 2018-03-25 PROCEDURE — 6370000000 HC RX 637 (ALT 250 FOR IP): Performed by: INTERNAL MEDICINE

## 2018-03-25 PROCEDURE — 94640 AIRWAY INHALATION TREATMENT: CPT

## 2018-03-25 PROCEDURE — 2580000003 HC RX 258: Performed by: INTERNAL MEDICINE

## 2018-03-25 PROCEDURE — 1200000000 HC SEMI PRIVATE

## 2018-03-25 PROCEDURE — 80202 ASSAY OF VANCOMYCIN: CPT

## 2018-03-25 PROCEDURE — 36415 COLL VENOUS BLD VENIPUNCTURE: CPT

## 2018-03-25 PROCEDURE — 80048 BASIC METABOLIC PNL TOTAL CA: CPT

## 2018-03-25 PROCEDURE — 2500000003 HC RX 250 WO HCPCS: Performed by: INTERNAL MEDICINE

## 2018-03-25 PROCEDURE — 97161 PT EVAL LOW COMPLEX 20 MIN: CPT | Performed by: PHYSICAL THERAPIST

## 2018-03-25 PROCEDURE — 71260 CT THORAX DX C+: CPT

## 2018-03-25 PROCEDURE — 86331 IMMUNODIFFUSION OUCHTERLONY: CPT

## 2018-03-25 PROCEDURE — 86606 ASPERGILLUS ANTIBODY: CPT

## 2018-03-25 PROCEDURE — 86003 ALLG SPEC IGE CRUDE XTRC EA: CPT

## 2018-03-25 PROCEDURE — 87070 CULTURE OTHR SPECIMN AEROBIC: CPT

## 2018-03-25 PROCEDURE — 97116 GAIT TRAINING THERAPY: CPT | Performed by: PHYSICAL THERAPIST

## 2018-03-25 PROCEDURE — 6360000002 HC RX W HCPCS: Performed by: INTERNAL MEDICINE

## 2018-03-25 PROCEDURE — 6360000004 HC RX CONTRAST MEDICATION: Performed by: RADIOLOGY

## 2018-03-25 PROCEDURE — 87205 SMEAR GRAM STAIN: CPT

## 2018-03-25 RX ORDER — CHLORDIAZEPOXIDE HYDROCHLORIDE AND CLIDINIUM BROMIDE 5; 2.5 MG/1; MG/1
1 CAPSULE ORAL
Status: DISCONTINUED | OUTPATIENT
Start: 2018-03-25 | End: 2018-03-30 | Stop reason: HOSPADM

## 2018-03-25 RX ORDER — PANTOPRAZOLE SODIUM 40 MG/1
40 TABLET, DELAYED RELEASE ORAL 2 TIMES DAILY
Status: DISCONTINUED | OUTPATIENT
Start: 2018-03-25 | End: 2018-03-30 | Stop reason: HOSPADM

## 2018-03-25 RX ORDER — DICYCLOMINE HYDROCHLORIDE 10 MG/1
20 CAPSULE ORAL EVERY 6 HOURS PRN
Status: DISCONTINUED | OUTPATIENT
Start: 2018-03-25 | End: 2018-03-30 | Stop reason: HOSPADM

## 2018-03-25 RX ADMIN — PANTOPRAZOLE SODIUM 40 MG: 40 TABLET, DELAYED RELEASE ORAL at 11:37

## 2018-03-25 RX ADMIN — BENZONATATE 100 MG: 100 CAPSULE ORAL at 21:03

## 2018-03-25 RX ADMIN — VANCOMYCIN HYDROCHLORIDE 1250 MG: 10 INJECTION, POWDER, LYOPHILIZED, FOR SOLUTION INTRAVENOUS at 01:19

## 2018-03-25 RX ADMIN — BUDESONIDE 250 MCG: 0.25 SUSPENSION RESPIRATORY (INHALATION) at 17:42

## 2018-03-25 RX ADMIN — METOPROLOL TARTRATE 50 MG: 25 TABLET ORAL at 16:24

## 2018-03-25 RX ADMIN — ARIPIPRAZOLE 20 MG: 10 TABLET ORAL at 11:36

## 2018-03-25 RX ADMIN — METHYLPREDNISOLONE SODIUM SUCCINATE 80 MG: 125 INJECTION, POWDER, FOR SOLUTION INTRAMUSCULAR; INTRAVENOUS at 20:57

## 2018-03-25 RX ADMIN — BENZONATATE 100 MG: 100 CAPSULE ORAL at 05:01

## 2018-03-25 RX ADMIN — TAZOBACTAM SODIUM AND PIPERACILLIN SODIUM 3.38 G: 375; 3 INJECTION, SOLUTION INTRAVENOUS at 18:22

## 2018-03-25 RX ADMIN — TAZOBACTAM SODIUM AND PIPERACILLIN SODIUM 3.38 G: 375; 3 INJECTION, SOLUTION INTRAVENOUS at 11:41

## 2018-03-25 RX ADMIN — METHYLPREDNISOLONE SODIUM SUCCINATE 80 MG: 125 INJECTION, POWDER, FOR SOLUTION INTRAMUSCULAR; INTRAVENOUS at 10:36

## 2018-03-25 RX ADMIN — IPRATROPIUM BROMIDE AND ALBUTEROL SULFATE 1 AMPULE: .5; 3 SOLUTION RESPIRATORY (INHALATION) at 17:42

## 2018-03-25 RX ADMIN — CLONIDINE HYDROCHLORIDE 0.2 MG: 0.2 TABLET ORAL at 20:57

## 2018-03-25 RX ADMIN — IPRATROPIUM BROMIDE AND ALBUTEROL SULFATE 1 AMPULE: .5; 3 SOLUTION RESPIRATORY (INHALATION) at 13:58

## 2018-03-25 RX ADMIN — VANCOMYCIN HYDROCHLORIDE 1250 MG: 10 INJECTION, POWDER, LYOPHILIZED, FOR SOLUTION INTRAVENOUS at 13:46

## 2018-03-25 RX ADMIN — IOPAMIDOL 80 ML: 755 INJECTION, SOLUTION INTRAVENOUS at 10:59

## 2018-03-25 RX ADMIN — OXYCODONE HYDROCHLORIDE AND ACETAMINOPHEN 1 TABLET: 10; 325 TABLET ORAL at 02:56

## 2018-03-25 RX ADMIN — OXYCODONE HYDROCHLORIDE AND ACETAMINOPHEN 1 TABLET: 10; 325 TABLET ORAL at 13:49

## 2018-03-25 RX ADMIN — BUDESONIDE 250 MCG: 0.25 SUSPENSION RESPIRATORY (INHALATION) at 07:24

## 2018-03-25 RX ADMIN — ALPRAZOLAM 2 MG: 1 TABLET ORAL at 21:03

## 2018-03-25 RX ADMIN — BUSPIRONE HYDROCHLORIDE 15 MG: 10 TABLET ORAL at 20:57

## 2018-03-25 RX ADMIN — PANTOPRAZOLE SODIUM 40 MG: 40 TABLET, DELAYED RELEASE ORAL at 20:57

## 2018-03-25 RX ADMIN — METHYLPREDNISOLONE SODIUM SUCCINATE 80 MG: 125 INJECTION, POWDER, FOR SOLUTION INTRAMUSCULAR; INTRAVENOUS at 16:24

## 2018-03-25 RX ADMIN — IPRATROPIUM BROMIDE AND ALBUTEROL SULFATE 1 AMPULE: .5; 3 SOLUTION RESPIRATORY (INHALATION) at 10:34

## 2018-03-25 RX ADMIN — OSELTAMIVIR PHOSPHATE 75 MG: 75 CAPSULE ORAL at 07:01

## 2018-03-25 RX ADMIN — MAGNESIUM HYDROXIDE 30 ML: 400 SUSPENSION ORAL at 03:00

## 2018-03-25 RX ADMIN — BUSPIRONE HYDROCHLORIDE 15 MG: 10 TABLET ORAL at 11:34

## 2018-03-25 RX ADMIN — TAZOBACTAM SODIUM AND PIPERACILLIN SODIUM 3.38 G: 375; 3 INJECTION, SOLUTION INTRAVENOUS at 01:30

## 2018-03-25 RX ADMIN — MONTELUKAST SODIUM 10 MG: 10 TABLET, FILM COATED ORAL at 20:57

## 2018-03-25 RX ADMIN — VORTIOXETINE 20 MG: 10 TABLET, FILM COATED ORAL at 11:33

## 2018-03-25 RX ADMIN — METOPROLOL TARTRATE 50 MG: 25 TABLET ORAL at 11:34

## 2018-03-25 RX ADMIN — IPRATROPIUM BROMIDE AND ALBUTEROL SULFATE 1 AMPULE: .5; 3 SOLUTION RESPIRATORY (INHALATION) at 07:23

## 2018-03-25 RX ADMIN — OSELTAMIVIR PHOSPHATE 75 MG: 75 CAPSULE ORAL at 18:22

## 2018-03-25 RX ADMIN — METHYLPREDNISOLONE SODIUM SUCCINATE 80 MG: 125 INJECTION, POWDER, FOR SOLUTION INTRAMUSCULAR; INTRAVENOUS at 05:00

## 2018-03-25 RX ADMIN — METOPROLOL TARTRATE 50 MG: 25 TABLET ORAL at 00:13

## 2018-03-25 RX ADMIN — OXYCODONE HYDROCHLORIDE AND ACETAMINOPHEN 1 TABLET: 10; 325 TABLET ORAL at 21:03

## 2018-03-25 ASSESSMENT — PAIN SCALES - GENERAL
PAINLEVEL_OUTOF10: 7
PAINLEVEL_OUTOF10: 4
PAINLEVEL_OUTOF10: 7
PAINLEVEL_OUTOF10: 6
PAINLEVEL_OUTOF10: 2
PAINLEVEL_OUTOF10: 0
PAINLEVEL_OUTOF10: 0

## 2018-03-26 ENCOUNTER — ANESTHESIA EVENT (OUTPATIENT)
Dept: ENDOSCOPY | Age: 53
DRG: 137 | End: 2018-03-26
Payer: COMMERCIAL

## 2018-03-26 LAB
ANION GAP SERPL CALCULATED.3IONS-SCNC: 17 MMOL/L (ref 7–16)
BUN BLDV-MCNC: 13 MG/DL (ref 6–20)
CALCIUM SERPL-MCNC: 8.8 MG/DL (ref 8.6–10.2)
CHLORIDE BLD-SCNC: 99 MMOL/L (ref 98–107)
CO2: 26 MMOL/L (ref 22–29)
CREAT SERPL-MCNC: 0.7 MG/DL (ref 0.5–1)
GFR AFRICAN AMERICAN: >60
GFR NON-AFRICAN AMERICAN: >60 ML/MIN/1.73
GLUCOSE BLD-MCNC: 150 MG/DL (ref 74–109)
HCT VFR BLD CALC: 38.2 % (ref 34–48)
HEMOGLOBIN: 12.3 G/DL (ref 11.5–15.5)
MCH RBC QN AUTO: 30.1 PG (ref 26–35)
MCHC RBC AUTO-ENTMCNC: 32.2 % (ref 32–34.5)
MCV RBC AUTO: 93.4 FL (ref 80–99.9)
PDW BLD-RTO: 14.6 FL (ref 11.5–15)
PLATELET # BLD: 220 E9/L (ref 130–450)
PMV BLD AUTO: 11.6 FL (ref 7–12)
POTASSIUM SERPL-SCNC: 4.1 MMOL/L (ref 3.5–5)
RBC # BLD: 4.09 E12/L (ref 3.5–5.5)
SODIUM BLD-SCNC: 142 MMOL/L (ref 132–146)
WBC # BLD: 10.8 E9/L (ref 4.5–11.5)

## 2018-03-26 PROCEDURE — 36415 COLL VENOUS BLD VENIPUNCTURE: CPT

## 2018-03-26 PROCEDURE — 2580000003 HC RX 258: Performed by: INTERNAL MEDICINE

## 2018-03-26 PROCEDURE — 2500000003 HC RX 250 WO HCPCS: Performed by: INTERNAL MEDICINE

## 2018-03-26 PROCEDURE — 94150 VITAL CAPACITY TEST: CPT

## 2018-03-26 PROCEDURE — 6360000002 HC RX W HCPCS: Performed by: INTERNAL MEDICINE

## 2018-03-26 PROCEDURE — 6370000000 HC RX 637 (ALT 250 FOR IP): Performed by: INTERNAL MEDICINE

## 2018-03-26 PROCEDURE — G8988 SELF CARE GOAL STATUS: HCPCS

## 2018-03-26 PROCEDURE — G8987 SELF CARE CURRENT STATUS: HCPCS

## 2018-03-26 PROCEDURE — 85027 COMPLETE CBC AUTOMATED: CPT

## 2018-03-26 PROCEDURE — 80048 BASIC METABOLIC PNL TOTAL CA: CPT

## 2018-03-26 PROCEDURE — 94640 AIRWAY INHALATION TREATMENT: CPT

## 2018-03-26 PROCEDURE — 97165 OT EVAL LOW COMPLEX 30 MIN: CPT

## 2018-03-26 PROCEDURE — 97530 THERAPEUTIC ACTIVITIES: CPT

## 2018-03-26 PROCEDURE — 1200000000 HC SEMI PRIVATE

## 2018-03-26 PROCEDURE — 97116 GAIT TRAINING THERAPY: CPT

## 2018-03-26 PROCEDURE — 2700000000 HC OXYGEN THERAPY PER DAY

## 2018-03-26 RX ORDER — KETOROLAC TROMETHAMINE 30 MG/ML
30 INJECTION, SOLUTION INTRAMUSCULAR; INTRAVENOUS EVERY 6 HOURS PRN
Status: DISCONTINUED | OUTPATIENT
Start: 2018-03-26 | End: 2018-03-30 | Stop reason: HOSPADM

## 2018-03-26 RX ORDER — METHYLPREDNISOLONE SODIUM SUCCINATE 125 MG/2ML
60 INJECTION, POWDER, LYOPHILIZED, FOR SOLUTION INTRAMUSCULAR; INTRAVENOUS EVERY 6 HOURS
Status: DISCONTINUED | OUTPATIENT
Start: 2018-03-26 | End: 2018-03-28

## 2018-03-26 RX ORDER — AMOXICILLIN AND CLAVULANATE POTASSIUM 875; 125 MG/1; MG/1
1 TABLET, FILM COATED ORAL EVERY 12 HOURS SCHEDULED
Status: DISCONTINUED | OUTPATIENT
Start: 2018-03-26 | End: 2018-03-30 | Stop reason: HOSPADM

## 2018-03-26 RX ADMIN — BENZONATATE 100 MG: 100 CAPSULE ORAL at 16:25

## 2018-03-26 RX ADMIN — ACETAMINOPHEN 650 MG: 325 TABLET, FILM COATED ORAL at 00:00

## 2018-03-26 RX ADMIN — Medication 10 ML: at 21:56

## 2018-03-26 RX ADMIN — DICYCLOMINE HYDROCHLORIDE 20 MG: 10 CAPSULE ORAL at 14:38

## 2018-03-26 RX ADMIN — IPRATROPIUM BROMIDE AND ALBUTEROL SULFATE 1 AMPULE: .5; 3 SOLUTION RESPIRATORY (INHALATION) at 05:25

## 2018-03-26 RX ADMIN — POTASSIUM CHLORIDE AND SODIUM CHLORIDE: 900; 150 INJECTION, SOLUTION INTRAVENOUS at 00:00

## 2018-03-26 RX ADMIN — AMOXICILLIN AND CLAVULANATE POTASSIUM 1 TABLET: 875; 125 TABLET, FILM COATED ORAL at 11:04

## 2018-03-26 RX ADMIN — METHYLPREDNISOLONE SODIUM SUCCINATE 60 MG: 125 INJECTION, POWDER, LYOPHILIZED, FOR SOLUTION INTRAMUSCULAR; INTRAVENOUS at 09:41

## 2018-03-26 RX ADMIN — METHYLPREDNISOLONE SODIUM SUCCINATE 60 MG: 125 INJECTION, POWDER, LYOPHILIZED, FOR SOLUTION INTRAMUSCULAR; INTRAVENOUS at 16:26

## 2018-03-26 RX ADMIN — PANTOPRAZOLE SODIUM 40 MG: 40 TABLET, DELAYED RELEASE ORAL at 20:55

## 2018-03-26 RX ADMIN — OXYCODONE HYDROCHLORIDE AND ACETAMINOPHEN 1 TABLET: 10; 325 TABLET ORAL at 20:55

## 2018-03-26 RX ADMIN — BUDESONIDE 250 MCG: 0.25 SUSPENSION RESPIRATORY (INHALATION) at 05:26

## 2018-03-26 RX ADMIN — CLONIDINE HYDROCHLORIDE 0.2 MG: 0.2 TABLET ORAL at 20:56

## 2018-03-26 RX ADMIN — OSELTAMIVIR PHOSPHATE 75 MG: 75 CAPSULE ORAL at 05:18

## 2018-03-26 RX ADMIN — ARIPIPRAZOLE 20 MG: 10 TABLET ORAL at 08:06

## 2018-03-26 RX ADMIN — IPRATROPIUM BROMIDE AND ALBUTEROL SULFATE 1 AMPULE: .5; 3 SOLUTION RESPIRATORY (INHALATION) at 09:09

## 2018-03-26 RX ADMIN — PANTOPRAZOLE SODIUM 40 MG: 40 TABLET, DELAYED RELEASE ORAL at 08:07

## 2018-03-26 RX ADMIN — VORTIOXETINE 20 MG: 10 TABLET, FILM COATED ORAL at 08:06

## 2018-03-26 RX ADMIN — BUSPIRONE HYDROCHLORIDE 15 MG: 10 TABLET ORAL at 08:07

## 2018-03-26 RX ADMIN — METOPROLOL TARTRATE 50 MG: 25 TABLET ORAL at 16:34

## 2018-03-26 RX ADMIN — MONTELUKAST SODIUM 10 MG: 10 TABLET, FILM COATED ORAL at 20:55

## 2018-03-26 RX ADMIN — DICYCLOMINE HYDROCHLORIDE 20 MG: 10 CAPSULE ORAL at 04:19

## 2018-03-26 RX ADMIN — Medication 10 ML: at 08:07

## 2018-03-26 RX ADMIN — OSELTAMIVIR PHOSPHATE 75 MG: 75 CAPSULE ORAL at 18:01

## 2018-03-26 RX ADMIN — POTASSIUM CHLORIDE AND SODIUM CHLORIDE: 900; 150 INJECTION, SOLUTION INTRAVENOUS at 16:30

## 2018-03-26 RX ADMIN — AMOXICILLIN AND CLAVULANATE POTASSIUM 1 TABLET: 875; 125 TABLET, FILM COATED ORAL at 20:55

## 2018-03-26 RX ADMIN — METHYLPREDNISOLONE SODIUM SUCCINATE 60 MG: 125 INJECTION, POWDER, LYOPHILIZED, FOR SOLUTION INTRAMUSCULAR; INTRAVENOUS at 21:55

## 2018-03-26 RX ADMIN — BUSPIRONE HYDROCHLORIDE 15 MG: 10 TABLET ORAL at 20:55

## 2018-03-26 RX ADMIN — METHYLPREDNISOLONE SODIUM SUCCINATE 80 MG: 125 INJECTION, POWDER, FOR SOLUTION INTRAMUSCULAR; INTRAVENOUS at 04:04

## 2018-03-26 RX ADMIN — BUDESONIDE 250 MCG: 0.25 SUSPENSION RESPIRATORY (INHALATION) at 18:34

## 2018-03-26 RX ADMIN — IPRATROPIUM BROMIDE AND ALBUTEROL SULFATE 1 AMPULE: .5; 3 SOLUTION RESPIRATORY (INHALATION) at 13:05

## 2018-03-26 RX ADMIN — OXYCODONE HYDROCHLORIDE AND ACETAMINOPHEN 1 TABLET: 10; 325 TABLET ORAL at 14:36

## 2018-03-26 RX ADMIN — IPRATROPIUM BROMIDE AND ALBUTEROL SULFATE 1 AMPULE: .5; 3 SOLUTION RESPIRATORY (INHALATION) at 18:33

## 2018-03-26 RX ADMIN — KETOROLAC TROMETHAMINE 30 MG: 30 INJECTION, SOLUTION INTRAMUSCULAR at 09:40

## 2018-03-26 RX ADMIN — VANCOMYCIN HYDROCHLORIDE 1250 MG: 10 INJECTION, POWDER, LYOPHILIZED, FOR SOLUTION INTRAVENOUS at 01:30

## 2018-03-26 RX ADMIN — TAZOBACTAM SODIUM AND PIPERACILLIN SODIUM 3.38 G: 375; 3 INJECTION, SOLUTION INTRAVENOUS at 01:41

## 2018-03-26 ASSESSMENT — PAIN SCALES - GENERAL
PAINLEVEL_OUTOF10: 7
PAINLEVEL_OUTOF10: 6
PAINLEVEL_OUTOF10: 7
PAINLEVEL_OUTOF10: 4
PAINLEVEL_OUTOF10: 0

## 2018-03-26 ASSESSMENT — PAIN DESCRIPTION - ORIENTATION
ORIENTATION: INNER
ORIENTATION: RIGHT

## 2018-03-26 ASSESSMENT — PAIN DESCRIPTION - DESCRIPTORS
DESCRIPTORS: ACHING;DISCOMFORT;CONSTANT
DESCRIPTORS: ACHING;DISCOMFORT;SORE
DESCRIPTORS: DISCOMFORT;HEAVINESS

## 2018-03-26 ASSESSMENT — PAIN DESCRIPTION - LOCATION
LOCATION: CHEST;HEAD;BACK
LOCATION: ABDOMEN;LEG
LOCATION: GENERALIZED

## 2018-03-26 ASSESSMENT — PAIN DESCRIPTION - PROGRESSION
CLINICAL_PROGRESSION: NOT CHANGED

## 2018-03-26 ASSESSMENT — PAIN DESCRIPTION - DIRECTION: RADIATING_TOWARDS: RT SIDE OF BODY

## 2018-03-26 ASSESSMENT — PAIN DESCRIPTION - FREQUENCY
FREQUENCY: INTERMITTENT
FREQUENCY: INTERMITTENT

## 2018-03-26 ASSESSMENT — PAIN DESCRIPTION - PAIN TYPE
TYPE: ACUTE PAIN;CHRONIC PAIN
TYPE: CHRONIC PAIN
TYPE: ACUTE PAIN

## 2018-03-26 ASSESSMENT — PAIN DESCRIPTION - ONSET
ONSET: ON-GOING
ONSET: ON-GOING

## 2018-03-26 ASSESSMENT — ENCOUNTER SYMPTOMS: SHORTNESS OF BREATH: 1

## 2018-03-26 NOTE — ANESTHESIA PRE PROCEDURE
daily.    Historical Provider, MD   diclofenac sodium 1 % GEL Apply 2 g topically 2 times daily.     Historical Provider, MD       Current medications:    Current Facility-Administered Medications   Medication Dose Route Frequency Provider Last Rate Last Dose    ketorolac (TORADOL) injection 30 mg  30 mg Intravenous Q6H PRN Calvin Demetrius, DO   30 mg at 03/26/18 0940    methylPREDNISolone sodium (SOLU-MEDROL) injection 60 mg  60 mg Intravenous Q6H Calvin Demetrius, DO   60 mg at 03/26/18 1626    amoxicillin-clavulanate (AUGMENTIN) 875-125 MG per tablet 1 tablet  1 tablet Oral 2 times per day Calvin Demetrius, DO   1 tablet at 03/26/18 1104    chlordiazePOXIDE-clidinium (LIBRAX) 5-2.5 MG per capsule 1 capsule  1 capsule Oral TID WC Calvin Demetrius, DO        dicyclomine (BENTYL) capsule 20 mg  20 mg Oral Q6H PRN Calvin Demetrius, DO   20 mg at 03/26/18 1438    hyoscyamine (LEVSIN/SL) sublingual tablet 125 mcg  125 mcg Sublingual Q6H PRN Calvin Demetrius, DO        pantoprazole (PROTONIX) tablet 40 mg  40 mg Oral BID Calvin Demetrius, DO   40 mg at 03/26/18 3231    polyethylene glycol (GLYCOLAX) packet 17 g  17 g Oral Daily Calvin Demetrius, DO   17 g at 03/24/18 1001    cloNIDine (CATAPRES) tablet 0.2 mg  0.2 mg Oral Nightly Leellen Diaz, DO   0.2 mg at 03/25/18 2057    montelukast (SINGULAIR) tablet 10 mg  10 mg Oral Nightly Leellen Diaz, DO   10 mg at 03/25/18 2057    metoprolol tartrate (LOPRESSOR) tablet 50 mg  50 mg Oral Q8H Ismail Arias, DO   50 mg at 03/26/18 1634    oseltamivir (TAMIFLU) capsule 75 mg  75 mg Oral BID Calvin Demetrius, DO   75 mg at 03/26/18 0518    0.9% NaCl with KCl 20 mEq infusion   Intravenous Continuous Calvin Demetrius, DO 75 mL/hr at 03/26/18 1630      ALPRAZolam Euna Larry) tablet 2 mg  2 mg Oral TID PRN Calvin Demetrius, DO   2 mg at 03/25/18 2103    ARIPiprazole (ABILIFY) tablet 20 mg  20 mg Oral Daily Calvin Demetrius, DO   20 mg at 03/26/18 0806    busPIRone (BUSPAR) tablet 15 mg  15 mg Oral BID Calvin Demetrius, DO   15 mg at 03/26/18 0807    VORTIoxetine (TRINTELLIX) tablet 20 mg  20 mg Oral Daily Cleta Junes, DO   20 mg at 03/26/18 0806    oxyCODONE-acetaminophen (PERCOCET)  MG per tablet 1 tablet  1 tablet Oral Q6H PRN Cleta Junes, DO   1 tablet at 03/26/18 1436    sodium chloride flush 0.9 % injection 10 mL  10 mL Intravenous 2 times per day Myrene Sark, DO   10 mL at 03/26/18 0807    sodium chloride flush 0.9 % injection 10 mL  10 mL Intravenous PRN Myrene Sark, DO        acetaminophen (TYLENOL) tablet 650 mg  650 mg Oral Q4H PRN Myrene Sark, DO   650 mg at 03/26/18 0000    magnesium hydroxide (MILK OF MAGNESIA) 400 MG/5ML suspension 30 mL  30 mL Oral Daily PRN Myrene Sark, DO   30 mL at 03/25/18 0300    glucose (GLUTOSE) 40 % oral gel 15 g  15 g Oral PRN Myrene Sark, DO        dextrose 50 % solution 12.5 g  12.5 g Intravenous PRN Myrene Sark, DO        glucagon (rDNA) injection 1 mg  1 mg Intramuscular PRN Myrene Sark, DO        dextrose 5 % solution  100 mL/hr Intravenous PRN Myrene Sark, DO        ipratropium-albuterol (DUONEB) nebulizer solution 1 ampule  1 ampule Inhalation Q4H While awake Myrene Sark, DO   1 ampule at 03/26/18 1305    benzonatate (TESSALON) capsule 100 mg  100 mg Oral TID PRN Myrene Sark, DO   100 mg at 03/26/18 1625    diphenhydrAMINE (BENADRYL) injection 25 mg  25 mg Intravenous PRN Myrene Sark, DO        budesonide (PULMICORT) nebulizer suspension 250 mcg  250 mcg Nebulization BID Myrene Sark, DO   250 mcg at 03/26/18 6924       Allergies:     Allergies   Allergen Reactions    Cymbalta [Duloxetine Hcl] Other (See Comments)     anger    Dexlansoprazole Nausea Only    Diovan [Valsartan] Other (See Comments)     Cough, sore throat    Ditropan [Oxybutynin] Other (See Comments)     hoarsness    Lunesta [Eszopiclone] Other (See Comments)     Bad dreams    Molds & Smuts     Norvasc [Amlodipine Besylate] Other (See Comments)     cough    Sular [Nisoldipine Er]

## 2018-03-27 ENCOUNTER — ANESTHESIA (OUTPATIENT)
Dept: ENDOSCOPY | Age: 53
DRG: 137 | End: 2018-03-27
Payer: COMMERCIAL

## 2018-03-27 VITALS
OXYGEN SATURATION: 98 % | DIASTOLIC BLOOD PRESSURE: 90 MMHG | SYSTOLIC BLOOD PRESSURE: 139 MMHG | RESPIRATION RATE: 18 BRPM

## 2018-03-27 LAB
ANION GAP SERPL CALCULATED.3IONS-SCNC: 16 MMOL/L (ref 7–16)
BUN BLDV-MCNC: 17 MG/DL (ref 6–20)
CALCIUM SERPL-MCNC: 8.4 MG/DL (ref 8.6–10.2)
CHLORIDE BLD-SCNC: 104 MMOL/L (ref 98–107)
CO2: 21 MMOL/L (ref 22–29)
CREAT SERPL-MCNC: 0.7 MG/DL (ref 0.5–1)
CULTURE, RESPIRATORY: NORMAL
GFR AFRICAN AMERICAN: >60
GFR NON-AFRICAN AMERICAN: >60 ML/MIN/1.73
GLUCOSE BLD-MCNC: 158 MG/DL (ref 74–109)
HCT VFR BLD CALC: 39.3 % (ref 34–48)
HEMOGLOBIN: 12.4 G/DL (ref 11.5–15.5)
MCH RBC QN AUTO: 29.8 PG (ref 26–35)
MCHC RBC AUTO-ENTMCNC: 31.6 % (ref 32–34.5)
MCV RBC AUTO: 94.5 FL (ref 80–99.9)
METER GLUCOSE: 128 MG/DL (ref 70–110)
PDW BLD-RTO: 14.5 FL (ref 11.5–15)
PLATELET # BLD: 146 E9/L (ref 130–450)
PMV BLD AUTO: 12.2 FL (ref 7–12)
POTASSIUM SERPL-SCNC: 4.5 MMOL/L (ref 3.5–5)
RBC # BLD: 4.16 E12/L (ref 3.5–5.5)
SMEAR, RESPIRATORY: NORMAL
SODIUM BLD-SCNC: 141 MMOL/L (ref 132–146)
WBC # BLD: 10.9 E9/L (ref 4.5–11.5)

## 2018-03-27 PROCEDURE — 3700000000 HC ANESTHESIA ATTENDED CARE: Performed by: INTERNAL MEDICINE

## 2018-03-27 PROCEDURE — 94150 VITAL CAPACITY TEST: CPT

## 2018-03-27 PROCEDURE — 82962 GLUCOSE BLOOD TEST: CPT

## 2018-03-27 PROCEDURE — 80048 BASIC METABOLIC PNL TOTAL CA: CPT

## 2018-03-27 PROCEDURE — 6370000000 HC RX 637 (ALT 250 FOR IP): Performed by: INTERNAL MEDICINE

## 2018-03-27 PROCEDURE — 36415 COLL VENOUS BLD VENIPUNCTURE: CPT

## 2018-03-27 PROCEDURE — 2709999900 HC NON-CHARGEABLE SUPPLY: Performed by: INTERNAL MEDICINE

## 2018-03-27 PROCEDURE — 0B948ZZ DRAINAGE OF RIGHT UPPER LOBE BRONCHUS, VIA NATURAL OR ARTIFICIAL OPENING ENDOSCOPIC: ICD-10-PCS | Performed by: INTERNAL MEDICINE

## 2018-03-27 PROCEDURE — 3700000001 HC ADD 15 MINUTES (ANESTHESIA): Performed by: INTERNAL MEDICINE

## 2018-03-27 PROCEDURE — 94667 MNPJ CHEST WALL 1ST: CPT

## 2018-03-27 PROCEDURE — 94640 AIRWAY INHALATION TREATMENT: CPT

## 2018-03-27 PROCEDURE — 87205 SMEAR GRAM STAIN: CPT

## 2018-03-27 PROCEDURE — 87102 FUNGUS ISOLATION CULTURE: CPT

## 2018-03-27 PROCEDURE — 88112 CYTOPATH CELL ENHANCE TECH: CPT

## 2018-03-27 PROCEDURE — 3609011100 HC BRONCHOSCOPY BRUSHINGS: Performed by: INTERNAL MEDICINE

## 2018-03-27 PROCEDURE — 97116 GAIT TRAINING THERAPY: CPT

## 2018-03-27 PROCEDURE — 7100000001 HC PACU RECOVERY - ADDTL 15 MIN: Performed by: INTERNAL MEDICINE

## 2018-03-27 PROCEDURE — 85027 COMPLETE CBC AUTOMATED: CPT

## 2018-03-27 PROCEDURE — 6360000002 HC RX W HCPCS: Performed by: NURSE ANESTHETIST, CERTIFIED REGISTERED

## 2018-03-27 PROCEDURE — 6360000002 HC RX W HCPCS: Performed by: INTERNAL MEDICINE

## 2018-03-27 PROCEDURE — 1200000000 HC SEMI PRIVATE

## 2018-03-27 PROCEDURE — 7100000000 HC PACU RECOVERY - FIRST 15 MIN: Performed by: INTERNAL MEDICINE

## 2018-03-27 PROCEDURE — 87116 MYCOBACTERIA CULTURE: CPT

## 2018-03-27 PROCEDURE — 87015 SPECIMEN INFECT AGNT CONCNTJ: CPT

## 2018-03-27 PROCEDURE — 87206 SMEAR FLUORESCENT/ACID STAI: CPT

## 2018-03-27 PROCEDURE — 0BD48ZX EXTRACTION OF RIGHT UPPER LOBE BRONCHUS, VIA NATURAL OR ARTIFICIAL OPENING ENDOSCOPIC, DIAGNOSTIC: ICD-10-PCS | Performed by: INTERNAL MEDICINE

## 2018-03-27 PROCEDURE — 2580000003 HC RX 258: Performed by: NURSE ANESTHETIST, CERTIFIED REGISTERED

## 2018-03-27 PROCEDURE — 87070 CULTURE OTHR SPECIMN AEROBIC: CPT

## 2018-03-27 PROCEDURE — 2500000003 HC RX 250 WO HCPCS: Performed by: NURSE ANESTHETIST, CERTIFIED REGISTERED

## 2018-03-27 PROCEDURE — 3609011500 HC BRONCHOSCOPY DIAGNOSTIC OR CELL WASH ONLY: Performed by: INTERNAL MEDICINE

## 2018-03-27 PROCEDURE — 94668 MNPJ CHEST WALL SBSQ: CPT

## 2018-03-27 RX ORDER — SODIUM CHLORIDE 9 MG/ML
INJECTION, SOLUTION INTRAVENOUS CONTINUOUS PRN
Status: DISCONTINUED | OUTPATIENT
Start: 2018-03-27 | End: 2018-03-27 | Stop reason: SDUPTHER

## 2018-03-27 RX ORDER — LIDOCAINE HYDROCHLORIDE 20 MG/ML
INJECTION, SOLUTION INFILTRATION; PERINEURAL PRN
Status: DISCONTINUED | OUTPATIENT
Start: 2018-03-27 | End: 2018-03-27 | Stop reason: SDUPTHER

## 2018-03-27 RX ORDER — PROPOFOL 10 MG/ML
INJECTION, EMULSION INTRAVENOUS PRN
Status: DISCONTINUED | OUTPATIENT
Start: 2018-03-27 | End: 2018-03-27 | Stop reason: SDUPTHER

## 2018-03-27 RX ADMIN — ARIPIPRAZOLE 20 MG: 10 TABLET ORAL at 11:37

## 2018-03-27 RX ADMIN — MONTELUKAST SODIUM 10 MG: 10 TABLET, FILM COATED ORAL at 21:25

## 2018-03-27 RX ADMIN — METHYLPREDNISOLONE SODIUM SUCCINATE 60 MG: 125 INJECTION, POWDER, LYOPHILIZED, FOR SOLUTION INTRAMUSCULAR; INTRAVENOUS at 04:08

## 2018-03-27 RX ADMIN — LIDOCAINE HYDROCHLORIDE 140 MG: 20 INJECTION, SOLUTION INFILTRATION; PERINEURAL at 07:58

## 2018-03-27 RX ADMIN — PANTOPRAZOLE SODIUM 40 MG: 40 TABLET, DELAYED RELEASE ORAL at 11:42

## 2018-03-27 RX ADMIN — HYOSCYAMINE SULFATE 125 MCG: 0.12 TABLET, ORALLY DISINTEGRATING ORAL at 17:41

## 2018-03-27 RX ADMIN — ALPRAZOLAM 2 MG: 1 TABLET ORAL at 00:15

## 2018-03-27 RX ADMIN — VORTIOXETINE 20 MG: 10 TABLET, FILM COATED ORAL at 11:35

## 2018-03-27 RX ADMIN — OXYCODONE HYDROCHLORIDE AND ACETAMINOPHEN 1 TABLET: 10; 325 TABLET ORAL at 23:43

## 2018-03-27 RX ADMIN — METHYLPREDNISOLONE SODIUM SUCCINATE 60 MG: 125 INJECTION, POWDER, LYOPHILIZED, FOR SOLUTION INTRAMUSCULAR; INTRAVENOUS at 21:27

## 2018-03-27 RX ADMIN — OXYCODONE HYDROCHLORIDE AND ACETAMINOPHEN 1 TABLET: 10; 325 TABLET ORAL at 17:41

## 2018-03-27 RX ADMIN — BUSPIRONE HYDROCHLORIDE 15 MG: 10 TABLET ORAL at 21:25

## 2018-03-27 RX ADMIN — OXYCODONE HYDROCHLORIDE AND ACETAMINOPHEN 1 TABLET: 10; 325 TABLET ORAL at 04:13

## 2018-03-27 RX ADMIN — METOPROLOL TARTRATE 50 MG: 25 TABLET ORAL at 17:09

## 2018-03-27 RX ADMIN — AMOXICILLIN AND CLAVULANATE POTASSIUM 1 TABLET: 875; 125 TABLET, FILM COATED ORAL at 21:25

## 2018-03-27 RX ADMIN — HYOSCYAMINE SULFATE 125 MCG: 0.12 TABLET, ORALLY DISINTEGRATING ORAL at 11:35

## 2018-03-27 RX ADMIN — BUDESONIDE 250 MCG: 0.25 SUSPENSION RESPIRATORY (INHALATION) at 06:13

## 2018-03-27 RX ADMIN — METHYLPREDNISOLONE SODIUM SUCCINATE 60 MG: 125 INJECTION, POWDER, LYOPHILIZED, FOR SOLUTION INTRAMUSCULAR; INTRAVENOUS at 11:38

## 2018-03-27 RX ADMIN — DICYCLOMINE HYDROCHLORIDE 20 MG: 10 CAPSULE ORAL at 11:34

## 2018-03-27 RX ADMIN — BENZONATATE 100 MG: 100 CAPSULE ORAL at 23:43

## 2018-03-27 RX ADMIN — IPRATROPIUM BROMIDE AND ALBUTEROL SULFATE 1 AMPULE: .5; 3 SOLUTION RESPIRATORY (INHALATION) at 17:50

## 2018-03-27 RX ADMIN — SODIUM CHLORIDE: 9 INJECTION, SOLUTION INTRAVENOUS at 07:55

## 2018-03-27 RX ADMIN — IPRATROPIUM BROMIDE AND ALBUTEROL SULFATE 1 AMPULE: .5; 3 SOLUTION RESPIRATORY (INHALATION) at 06:13

## 2018-03-27 RX ADMIN — CLONIDINE HYDROCHLORIDE 0.2 MG: 0.2 TABLET ORAL at 21:25

## 2018-03-27 RX ADMIN — OSELTAMIVIR PHOSPHATE 75 MG: 75 CAPSULE ORAL at 05:30

## 2018-03-27 RX ADMIN — IPRATROPIUM BROMIDE AND ALBUTEROL SULFATE 1 AMPULE: .5; 3 SOLUTION RESPIRATORY (INHALATION) at 13:08

## 2018-03-27 RX ADMIN — POTASSIUM CHLORIDE AND SODIUM CHLORIDE: 900; 150 INJECTION, SOLUTION INTRAVENOUS at 21:25

## 2018-03-27 RX ADMIN — KETOROLAC TROMETHAMINE 30 MG: 30 INJECTION, SOLUTION INTRAMUSCULAR at 00:06

## 2018-03-27 RX ADMIN — PROPOFOL 150 MG: 10 INJECTION, EMULSION INTRAVENOUS at 07:58

## 2018-03-27 RX ADMIN — POTASSIUM CHLORIDE AND SODIUM CHLORIDE: 900; 150 INJECTION, SOLUTION INTRAVENOUS at 05:25

## 2018-03-27 RX ADMIN — BUSPIRONE HYDROCHLORIDE 15 MG: 10 TABLET ORAL at 11:34

## 2018-03-27 RX ADMIN — BUDESONIDE 250 MCG: 0.25 SUSPENSION RESPIRATORY (INHALATION) at 17:50

## 2018-03-27 RX ADMIN — METOPROLOL TARTRATE 50 MG: 25 TABLET ORAL at 11:34

## 2018-03-27 RX ADMIN — DICYCLOMINE HYDROCHLORIDE 20 MG: 10 CAPSULE ORAL at 17:41

## 2018-03-27 RX ADMIN — METHYLPREDNISOLONE SODIUM SUCCINATE 60 MG: 125 INJECTION, POWDER, LYOPHILIZED, FOR SOLUTION INTRAMUSCULAR; INTRAVENOUS at 17:09

## 2018-03-27 RX ADMIN — PANTOPRAZOLE SODIUM 40 MG: 40 TABLET, DELAYED RELEASE ORAL at 21:25

## 2018-03-27 RX ADMIN — KETOROLAC TROMETHAMINE 30 MG: 30 INJECTION, SOLUTION INTRAMUSCULAR at 17:09

## 2018-03-27 RX ADMIN — PROPOFOL 120 MG: 10 INJECTION, EMULSION INTRAVENOUS at 08:00

## 2018-03-27 RX ADMIN — OSELTAMIVIR PHOSPHATE 75 MG: 75 CAPSULE ORAL at 17:09

## 2018-03-27 RX ADMIN — IPRATROPIUM BROMIDE AND ALBUTEROL SULFATE 1 AMPULE: .5; 3 SOLUTION RESPIRATORY (INHALATION) at 09:40

## 2018-03-27 RX ADMIN — AMOXICILLIN AND CLAVULANATE POTASSIUM 1 TABLET: 875; 125 TABLET, FILM COATED ORAL at 11:35

## 2018-03-27 ASSESSMENT — PAIN DESCRIPTION - LOCATION
LOCATION: HEAD;NECK;ARM;LEG
LOCATION: BACK;NECK
LOCATION: ABDOMEN
LOCATION: ABDOMEN;HEAD
LOCATION: HEAD;NECK;THROAT

## 2018-03-27 ASSESSMENT — PAIN DESCRIPTION - DESCRIPTORS
DESCRIPTORS: ACHING;CONSTANT;DISCOMFORT;HEADACHE;SORE
DESCRIPTORS: ACHING;HEADACHE
DESCRIPTORS: ACHING;DISCOMFORT
DESCRIPTORS: ACHING;DISCOMFORT;SORE
DESCRIPTORS: ACHING;CRAMPING

## 2018-03-27 ASSESSMENT — PAIN SCALES - GENERAL
PAINLEVEL_OUTOF10: 0
PAINLEVEL_OUTOF10: 0
PAINLEVEL_OUTOF10: 8
PAINLEVEL_OUTOF10: 0
PAINLEVEL_OUTOF10: 8
PAINLEVEL_OUTOF10: 8
PAINLEVEL_OUTOF10: 7

## 2018-03-27 ASSESSMENT — PAIN DESCRIPTION - PAIN TYPE
TYPE: CHRONIC PAIN;NEUROPATHIC PAIN
TYPE: ACUTE PAIN
TYPE: SURGICAL PAIN
TYPE: CHRONIC PAIN;NEUROPATHIC PAIN
TYPE: ACUTE PAIN
TYPE: CHRONIC PAIN;SURGICAL PAIN;NEUROPATHIC PAIN

## 2018-03-27 ASSESSMENT — PAIN DESCRIPTION - FREQUENCY
FREQUENCY: INTERMITTENT
FREQUENCY: CONTINUOUS
FREQUENCY: CONTINUOUS

## 2018-03-27 ASSESSMENT — PAIN DESCRIPTION - ONSET: ONSET: ON-GOING

## 2018-03-27 NOTE — ANESTHESIA POSTPROCEDURE EVALUATION
Department of Anesthesiology  Postprocedure Note    Patient: Jossue Palumbo  MRN: [id number]  YOB: 1965  Date of evaluation: 3/27/2018  Time:  9:22 AM     Procedure Summary     Date:  03/27/18 Room / Location:  [facility] 01 / [facility] ENDOSCOPY    Anesthesia Start:  0753 Anesthesia Stop:  Baby Patient    Procedures:       BRONCHOSCOPY DIAGNOSTIC OR CELL [address] ONLY (N/A )      BRONCHOSCOPY BRUSHINGS Diagnosis:  (RESP DISTRESS)    Surgeon:  Morgan Calderon MD Responsible Provider:  Justus Panda DO    Anesthesia Type:  MAC ASA Status:  3          Anesthesia Type: MAC    Shmuel Phase I: Shmuel Score: 9    Shmuel Phase II:      Last vitals: Reviewed and per EMR flowsheets.        Anesthesia Post Evaluation    Patient location during evaluation: PACU  Patient participation: complete - patient participated  Level of consciousness: awake  Pain score: 1  Airway patency: patent  Nausea & Vomiting: no nausea and no vomiting  Complications: no  Cardiovascular status: hemodynamically stable  Respiratory status: acceptable  Hydration status: euvolemic

## 2018-03-28 LAB
ANION GAP SERPL CALCULATED.3IONS-SCNC: 15 MMOL/L (ref 7–16)
BLOOD CULTURE, ROUTINE: NORMAL
BUN BLDV-MCNC: 17 MG/DL (ref 6–20)
CALCIUM SERPL-MCNC: 8.3 MG/DL (ref 8.6–10.2)
CHLORIDE BLD-SCNC: 103 MMOL/L (ref 98–107)
CO2: 24 MMOL/L (ref 22–29)
CREAT SERPL-MCNC: 0.6 MG/DL (ref 0.5–1)
CULTURE, BLOOD 2: NORMAL
GFR AFRICAN AMERICAN: >60
GFR NON-AFRICAN AMERICAN: >60 ML/MIN/1.73
GLUCOSE BLD-MCNC: 157 MG/DL (ref 74–109)
HCT VFR BLD CALC: 39.7 % (ref 34–48)
HEMOGLOBIN: 12.8 G/DL (ref 11.5–15.5)
Lab: NORMAL
MCH RBC QN AUTO: 30 PG (ref 26–35)
MCHC RBC AUTO-ENTMCNC: 32.2 % (ref 32–34.5)
MCV RBC AUTO: 93 FL (ref 80–99.9)
PDW BLD-RTO: 14.6 FL (ref 11.5–15)
PLATELET # BLD: 222 E9/L (ref 130–450)
PMV BLD AUTO: 12.3 FL (ref 7–12)
POTASSIUM SERPL-SCNC: 4.1 MMOL/L (ref 3.5–5)
RBC # BLD: 4.27 E12/L (ref 3.5–5.5)
REPORT: NORMAL
SODIUM BLD-SCNC: 142 MMOL/L (ref 132–146)
THIS TEST SENT TO: NORMAL
WBC # BLD: 11.4 E9/L (ref 4.5–11.5)

## 2018-03-28 PROCEDURE — 2580000003 HC RX 258: Performed by: INTERNAL MEDICINE

## 2018-03-28 PROCEDURE — 94150 VITAL CAPACITY TEST: CPT

## 2018-03-28 PROCEDURE — 97530 THERAPEUTIC ACTIVITIES: CPT

## 2018-03-28 PROCEDURE — 97116 GAIT TRAINING THERAPY: CPT

## 2018-03-28 PROCEDURE — 85027 COMPLETE CBC AUTOMATED: CPT

## 2018-03-28 PROCEDURE — 80048 BASIC METABOLIC PNL TOTAL CA: CPT

## 2018-03-28 PROCEDURE — 1200000000 HC SEMI PRIVATE

## 2018-03-28 PROCEDURE — 97535 SELF CARE MNGMENT TRAINING: CPT

## 2018-03-28 PROCEDURE — 94640 AIRWAY INHALATION TREATMENT: CPT

## 2018-03-28 PROCEDURE — 6360000002 HC RX W HCPCS: Performed by: INTERNAL MEDICINE

## 2018-03-28 PROCEDURE — 36415 COLL VENOUS BLD VENIPUNCTURE: CPT

## 2018-03-28 PROCEDURE — 6370000000 HC RX 637 (ALT 250 FOR IP): Performed by: INTERNAL MEDICINE

## 2018-03-28 RX ORDER — METHYLPREDNISOLONE SODIUM SUCCINATE 40 MG/ML
40 INJECTION, POWDER, LYOPHILIZED, FOR SOLUTION INTRAMUSCULAR; INTRAVENOUS EVERY 12 HOURS
Status: DISCONTINUED | OUTPATIENT
Start: 2018-03-28 | End: 2018-03-30 | Stop reason: HOSPADM

## 2018-03-28 RX ADMIN — DICYCLOMINE HYDROCHLORIDE 20 MG: 10 CAPSULE ORAL at 02:45

## 2018-03-28 RX ADMIN — MONTELUKAST SODIUM 10 MG: 10 TABLET, FILM COATED ORAL at 22:23

## 2018-03-28 RX ADMIN — METOPROLOL TARTRATE 50 MG: 25 TABLET ORAL at 16:26

## 2018-03-28 RX ADMIN — IPRATROPIUM BROMIDE AND ALBUTEROL SULFATE 1 AMPULE: .5; 3 SOLUTION RESPIRATORY (INHALATION) at 13:47

## 2018-03-28 RX ADMIN — AMOXICILLIN AND CLAVULANATE POTASSIUM 1 TABLET: 875; 125 TABLET, FILM COATED ORAL at 10:30

## 2018-03-28 RX ADMIN — DICYCLOMINE HYDROCHLORIDE 20 MG: 10 CAPSULE ORAL at 09:15

## 2018-03-28 RX ADMIN — ARIPIPRAZOLE 20 MG: 10 TABLET ORAL at 10:30

## 2018-03-28 RX ADMIN — AMOXICILLIN AND CLAVULANATE POTASSIUM 1 TABLET: 875; 125 TABLET, FILM COATED ORAL at 22:22

## 2018-03-28 RX ADMIN — OSELTAMIVIR PHOSPHATE 75 MG: 75 CAPSULE ORAL at 05:51

## 2018-03-28 RX ADMIN — PANTOPRAZOLE SODIUM 40 MG: 40 TABLET, DELAYED RELEASE ORAL at 09:15

## 2018-03-28 RX ADMIN — METHYLPREDNISOLONE SODIUM SUCCINATE 60 MG: 125 INJECTION, POWDER, LYOPHILIZED, FOR SOLUTION INTRAMUSCULAR; INTRAVENOUS at 09:16

## 2018-03-28 RX ADMIN — ALPRAZOLAM 2 MG: 1 TABLET ORAL at 21:47

## 2018-03-28 RX ADMIN — VORTIOXETINE 20 MG: 10 TABLET, FILM COATED ORAL at 10:30

## 2018-03-28 RX ADMIN — BUDESONIDE 250 MCG: 0.25 SUSPENSION RESPIRATORY (INHALATION) at 07:12

## 2018-03-28 RX ADMIN — METHYLPREDNISOLONE SODIUM SUCCINATE 40 MG: 40 INJECTION, POWDER, FOR SOLUTION INTRAMUSCULAR; INTRAVENOUS at 22:23

## 2018-03-28 RX ADMIN — POLYETHYLENE GLYCOL 3350 17 G: 17 POWDER, FOR SOLUTION ORAL at 09:17

## 2018-03-28 RX ADMIN — METOPROLOL TARTRATE 50 MG: 25 TABLET ORAL at 09:15

## 2018-03-28 RX ADMIN — HYOSCYAMINE SULFATE 125 MCG: 0.12 TABLET, ORALLY DISINTEGRATING ORAL at 09:16

## 2018-03-28 RX ADMIN — BUDESONIDE 250 MCG: 0.25 SUSPENSION RESPIRATORY (INHALATION) at 18:38

## 2018-03-28 RX ADMIN — IPRATROPIUM BROMIDE AND ALBUTEROL SULFATE 1 AMPULE: .5; 3 SOLUTION RESPIRATORY (INHALATION) at 11:14

## 2018-03-28 RX ADMIN — BUSPIRONE HYDROCHLORIDE 15 MG: 10 TABLET ORAL at 10:30

## 2018-03-28 RX ADMIN — HYOSCYAMINE SULFATE 125 MCG: 0.12 TABLET, ORALLY DISINTEGRATING ORAL at 02:45

## 2018-03-28 RX ADMIN — METOPROLOL TARTRATE 50 MG: 25 TABLET ORAL at 00:51

## 2018-03-28 RX ADMIN — KETOROLAC TROMETHAMINE 30 MG: 30 INJECTION, SOLUTION INTRAMUSCULAR at 16:26

## 2018-03-28 RX ADMIN — IPRATROPIUM BROMIDE AND ALBUTEROL SULFATE 1 AMPULE: .5; 3 SOLUTION RESPIRATORY (INHALATION) at 18:38

## 2018-03-28 RX ADMIN — BUSPIRONE HYDROCHLORIDE 15 MG: 10 TABLET ORAL at 22:22

## 2018-03-28 RX ADMIN — KETOROLAC TROMETHAMINE 30 MG: 30 INJECTION, SOLUTION INTRAMUSCULAR at 05:51

## 2018-03-28 RX ADMIN — Medication 10 ML: at 09:19

## 2018-03-28 RX ADMIN — CLONIDINE HYDROCHLORIDE 0.2 MG: 0.2 TABLET ORAL at 21:47

## 2018-03-28 RX ADMIN — METHYLPREDNISOLONE SODIUM SUCCINATE 60 MG: 125 INJECTION, POWDER, LYOPHILIZED, FOR SOLUTION INTRAMUSCULAR; INTRAVENOUS at 04:31

## 2018-03-28 RX ADMIN — OXYCODONE HYDROCHLORIDE AND ACETAMINOPHEN 1 TABLET: 10; 325 TABLET ORAL at 19:13

## 2018-03-28 RX ADMIN — Medication 10 ML: at 22:04

## 2018-03-28 RX ADMIN — OXYCODONE HYDROCHLORIDE AND ACETAMINOPHEN 1 TABLET: 10; 325 TABLET ORAL at 12:13

## 2018-03-28 RX ADMIN — BENZONATATE 100 MG: 100 CAPSULE ORAL at 16:26

## 2018-03-28 RX ADMIN — IPRATROPIUM BROMIDE AND ALBUTEROL SULFATE 1 AMPULE: .5; 3 SOLUTION RESPIRATORY (INHALATION) at 07:11

## 2018-03-28 RX ADMIN — PANTOPRAZOLE SODIUM 40 MG: 40 TABLET, DELAYED RELEASE ORAL at 21:47

## 2018-03-28 ASSESSMENT — PAIN SCALES - GENERAL
PAINLEVEL_OUTOF10: 2
PAINLEVEL_OUTOF10: 8
PAINLEVEL_OUTOF10: 5
PAINLEVEL_OUTOF10: 8
PAINLEVEL_OUTOF10: 7
PAINLEVEL_OUTOF10: 8

## 2018-03-28 ASSESSMENT — PAIN DESCRIPTION - LOCATION: LOCATION: THROAT

## 2018-03-28 ASSESSMENT — PAIN DESCRIPTION - PAIN TYPE: TYPE: ACUTE PAIN

## 2018-03-29 LAB
ANION GAP SERPL CALCULATED.3IONS-SCNC: 17 MMOL/L (ref 7–16)
BUN BLDV-MCNC: 18 MG/DL (ref 6–20)
CALCIUM SERPL-MCNC: 8.6 MG/DL (ref 8.6–10.2)
CHLORIDE BLD-SCNC: 100 MMOL/L (ref 98–107)
CO2: 25 MMOL/L (ref 22–29)
CREAT SERPL-MCNC: 0.6 MG/DL (ref 0.5–1)
CULTURE SURGICAL: NORMAL
CULTURE, RESPIRATORY: NORMAL
GFR AFRICAN AMERICAN: >60
GFR NON-AFRICAN AMERICAN: >60 ML/MIN/1.73
GLUCOSE BLD-MCNC: 146 MG/DL (ref 74–109)
GRAM STAIN RESULT: NORMAL
HCT VFR BLD CALC: 39.4 % (ref 34–48)
HEMOGLOBIN: 13.1 G/DL (ref 11.5–15.5)
MCH RBC QN AUTO: 30.3 PG (ref 26–35)
MCHC RBC AUTO-ENTMCNC: 33.2 % (ref 32–34.5)
MCV RBC AUTO: 91.2 FL (ref 80–99.9)
PDW BLD-RTO: 14 FL (ref 11.5–15)
PLATELET # BLD: 225 E9/L (ref 130–450)
PMV BLD AUTO: 11.8 FL (ref 7–12)
POTASSIUM SERPL-SCNC: 4.4 MMOL/L (ref 3.5–5)
RBC # BLD: 4.32 E12/L (ref 3.5–5.5)
SMEAR, RESPIRATORY: NORMAL
SODIUM BLD-SCNC: 142 MMOL/L (ref 132–146)
WBC # BLD: 10.5 E9/L (ref 4.5–11.5)

## 2018-03-29 PROCEDURE — 94640 AIRWAY INHALATION TREATMENT: CPT

## 2018-03-29 PROCEDURE — 6370000000 HC RX 637 (ALT 250 FOR IP): Performed by: INTERNAL MEDICINE

## 2018-03-29 PROCEDURE — 97110 THERAPEUTIC EXERCISES: CPT

## 2018-03-29 PROCEDURE — 80048 BASIC METABOLIC PNL TOTAL CA: CPT

## 2018-03-29 PROCEDURE — 97116 GAIT TRAINING THERAPY: CPT

## 2018-03-29 PROCEDURE — 85027 COMPLETE CBC AUTOMATED: CPT

## 2018-03-29 PROCEDURE — 36415 COLL VENOUS BLD VENIPUNCTURE: CPT

## 2018-03-29 PROCEDURE — 94668 MNPJ CHEST WALL SBSQ: CPT

## 2018-03-29 PROCEDURE — 2580000003 HC RX 258: Performed by: INTERNAL MEDICINE

## 2018-03-29 PROCEDURE — 6360000002 HC RX W HCPCS: Performed by: INTERNAL MEDICINE

## 2018-03-29 PROCEDURE — 1200000000 HC SEMI PRIVATE

## 2018-03-29 RX ADMIN — ALPRAZOLAM 2 MG: 1 TABLET ORAL at 20:50

## 2018-03-29 RX ADMIN — PANTOPRAZOLE SODIUM 40 MG: 40 TABLET, DELAYED RELEASE ORAL at 20:43

## 2018-03-29 RX ADMIN — IPRATROPIUM BROMIDE AND ALBUTEROL SULFATE 1 AMPULE: .5; 3 SOLUTION RESPIRATORY (INHALATION) at 09:55

## 2018-03-29 RX ADMIN — OXYCODONE HYDROCHLORIDE AND ACETAMINOPHEN 1 TABLET: 10; 325 TABLET ORAL at 16:26

## 2018-03-29 RX ADMIN — BENZONATATE 100 MG: 100 CAPSULE ORAL at 16:27

## 2018-03-29 RX ADMIN — METOPROLOL TARTRATE 50 MG: 25 TABLET ORAL at 08:48

## 2018-03-29 RX ADMIN — BUSPIRONE HYDROCHLORIDE 15 MG: 10 TABLET ORAL at 10:13

## 2018-03-29 RX ADMIN — METHYLPREDNISOLONE SODIUM SUCCINATE 40 MG: 40 INJECTION, POWDER, FOR SOLUTION INTRAMUSCULAR; INTRAVENOUS at 20:44

## 2018-03-29 RX ADMIN — Medication 10 ML: at 22:14

## 2018-03-29 RX ADMIN — BUDESONIDE 250 MCG: 0.25 SUSPENSION RESPIRATORY (INHALATION) at 17:31

## 2018-03-29 RX ADMIN — MONTELUKAST SODIUM 10 MG: 10 TABLET, FILM COATED ORAL at 20:43

## 2018-03-29 RX ADMIN — METHYLPREDNISOLONE SODIUM SUCCINATE 40 MG: 40 INJECTION, POWDER, FOR SOLUTION INTRAMUSCULAR; INTRAVENOUS at 08:51

## 2018-03-29 RX ADMIN — IPRATROPIUM BROMIDE AND ALBUTEROL SULFATE 1 AMPULE: .5; 3 SOLUTION RESPIRATORY (INHALATION) at 13:34

## 2018-03-29 RX ADMIN — METOPROLOL TARTRATE 50 MG: 25 TABLET ORAL at 16:27

## 2018-03-29 RX ADMIN — AMOXICILLIN AND CLAVULANATE POTASSIUM 1 TABLET: 875; 125 TABLET, FILM COATED ORAL at 10:12

## 2018-03-29 RX ADMIN — AMOXICILLIN AND CLAVULANATE POTASSIUM 1 TABLET: 875; 125 TABLET, FILM COATED ORAL at 20:44

## 2018-03-29 RX ADMIN — BUDESONIDE 250 MCG: 0.25 SUSPENSION RESPIRATORY (INHALATION) at 05:37

## 2018-03-29 RX ADMIN — VORTIOXETINE 20 MG: 10 TABLET, FILM COATED ORAL at 10:13

## 2018-03-29 RX ADMIN — OXYCODONE HYDROCHLORIDE AND ACETAMINOPHEN 1 TABLET: 10; 325 TABLET ORAL at 08:58

## 2018-03-29 RX ADMIN — BUSPIRONE HYDROCHLORIDE 15 MG: 10 TABLET ORAL at 20:44

## 2018-03-29 RX ADMIN — IPRATROPIUM BROMIDE AND ALBUTEROL SULFATE 1 AMPULE: .5; 3 SOLUTION RESPIRATORY (INHALATION) at 05:38

## 2018-03-29 RX ADMIN — PANTOPRAZOLE SODIUM 40 MG: 40 TABLET, DELAYED RELEASE ORAL at 08:48

## 2018-03-29 RX ADMIN — CLONIDINE HYDROCHLORIDE 0.2 MG: 0.2 TABLET ORAL at 20:43

## 2018-03-29 RX ADMIN — ARIPIPRAZOLE 20 MG: 10 TABLET ORAL at 10:13

## 2018-03-29 RX ADMIN — Medication 10 ML: at 08:52

## 2018-03-29 RX ADMIN — IPRATROPIUM BROMIDE AND ALBUTEROL SULFATE 1 AMPULE: .5; 3 SOLUTION RESPIRATORY (INHALATION) at 17:31

## 2018-03-29 RX ADMIN — POLYETHYLENE GLYCOL 3350 17 G: 17 POWDER, FOR SOLUTION ORAL at 08:48

## 2018-03-29 RX ADMIN — METOPROLOL TARTRATE 50 MG: 25 TABLET ORAL at 00:41

## 2018-03-29 ASSESSMENT — PAIN SCALES - GENERAL
PAINLEVEL_OUTOF10: 7
PAINLEVEL_OUTOF10: 7
PAINLEVEL_OUTOF10: 2
PAINLEVEL_OUTOF10: 0
PAINLEVEL_OUTOF10: 7

## 2018-03-29 ASSESSMENT — PAIN DESCRIPTION - PAIN TYPE: TYPE: CHRONIC PAIN

## 2018-03-29 ASSESSMENT — PAIN DESCRIPTION - LOCATION: LOCATION: GENERALIZED

## 2018-03-30 VITALS
SYSTOLIC BLOOD PRESSURE: 110 MMHG | HEART RATE: 74 BPM | RESPIRATION RATE: 18 BRPM | TEMPERATURE: 98.4 F | WEIGHT: 222.3 LBS | DIASTOLIC BLOOD PRESSURE: 60 MMHG | BODY MASS INDEX: 41.97 KG/M2 | OXYGEN SATURATION: 96 % | HEIGHT: 61 IN

## 2018-03-30 LAB
ANION GAP SERPL CALCULATED.3IONS-SCNC: 14 MMOL/L (ref 7–16)
BUN BLDV-MCNC: 27 MG/DL (ref 6–20)
CALCIUM SERPL-MCNC: 8.5 MG/DL (ref 8.6–10.2)
CHLORIDE BLD-SCNC: 96 MMOL/L (ref 98–107)
CO2: 26 MMOL/L (ref 22–29)
CREAT SERPL-MCNC: 0.8 MG/DL (ref 0.5–1)
GFR AFRICAN AMERICAN: >60
GFR NON-AFRICAN AMERICAN: >60 ML/MIN/1.73
GLUCOSE BLD-MCNC: 132 MG/DL (ref 74–109)
HCT VFR BLD CALC: 39.4 % (ref 34–48)
HEMOGLOBIN: 13 G/DL (ref 11.5–15.5)
MCH RBC QN AUTO: 30 PG (ref 26–35)
MCHC RBC AUTO-ENTMCNC: 33 % (ref 32–34.5)
MCV RBC AUTO: 90.8 FL (ref 80–99.9)
PDW BLD-RTO: 14.3 FL (ref 11.5–15)
PLATELET # BLD: 251 E9/L (ref 130–450)
PMV BLD AUTO: 12.4 FL (ref 7–12)
POTASSIUM SERPL-SCNC: 4.4 MMOL/L (ref 3.5–5)
RBC # BLD: 4.34 E12/L (ref 3.5–5.5)
SODIUM BLD-SCNC: 136 MMOL/L (ref 132–146)
WBC # BLD: 11.7 E9/L (ref 4.5–11.5)

## 2018-03-30 PROCEDURE — 6360000002 HC RX W HCPCS: Performed by: INTERNAL MEDICINE

## 2018-03-30 PROCEDURE — 97530 THERAPEUTIC ACTIVITIES: CPT

## 2018-03-30 PROCEDURE — 80048 BASIC METABOLIC PNL TOTAL CA: CPT

## 2018-03-30 PROCEDURE — 97535 SELF CARE MNGMENT TRAINING: CPT

## 2018-03-30 PROCEDURE — 6370000000 HC RX 637 (ALT 250 FOR IP): Performed by: INTERNAL MEDICINE

## 2018-03-30 PROCEDURE — 94640 AIRWAY INHALATION TREATMENT: CPT

## 2018-03-30 PROCEDURE — 36415 COLL VENOUS BLD VENIPUNCTURE: CPT

## 2018-03-30 PROCEDURE — 85027 COMPLETE CBC AUTOMATED: CPT

## 2018-03-30 PROCEDURE — 2580000003 HC RX 258: Performed by: INTERNAL MEDICINE

## 2018-03-30 RX ORDER — AMOXICILLIN AND CLAVULANATE POTASSIUM 875; 125 MG/1; MG/1
1 TABLET, FILM COATED ORAL EVERY 12 HOURS SCHEDULED
Qty: 8 TABLET | Refills: 0 | Status: SHIPPED | OUTPATIENT
Start: 2018-03-30 | End: 2018-04-03

## 2018-03-30 RX ADMIN — HYOSCYAMINE SULFATE 125 MCG: 0.12 TABLET, ORALLY DISINTEGRATING ORAL at 09:05

## 2018-03-30 RX ADMIN — BUDESONIDE 250 MCG: 0.25 SUSPENSION RESPIRATORY (INHALATION) at 06:09

## 2018-03-30 RX ADMIN — POLYETHYLENE GLYCOL 3350 17 G: 17 POWDER, FOR SOLUTION ORAL at 09:13

## 2018-03-30 RX ADMIN — PANTOPRAZOLE SODIUM 40 MG: 40 TABLET, DELAYED RELEASE ORAL at 09:13

## 2018-03-30 RX ADMIN — METHYLPREDNISOLONE SODIUM SUCCINATE 40 MG: 40 INJECTION, POWDER, FOR SOLUTION INTRAMUSCULAR; INTRAVENOUS at 09:08

## 2018-03-30 RX ADMIN — BENZONATATE 100 MG: 100 CAPSULE ORAL at 10:03

## 2018-03-30 RX ADMIN — ARIPIPRAZOLE 20 MG: 10 TABLET ORAL at 09:07

## 2018-03-30 RX ADMIN — Medication 10 ML: at 09:09

## 2018-03-30 RX ADMIN — IPRATROPIUM BROMIDE AND ALBUTEROL SULFATE 1 AMPULE: .5; 3 SOLUTION RESPIRATORY (INHALATION) at 10:13

## 2018-03-30 RX ADMIN — IPRATROPIUM BROMIDE AND ALBUTEROL SULFATE 1 AMPULE: .5; 3 SOLUTION RESPIRATORY (INHALATION) at 06:09

## 2018-03-30 RX ADMIN — DICYCLOMINE HYDROCHLORIDE 20 MG: 10 CAPSULE ORAL at 09:06

## 2018-03-30 RX ADMIN — OXYCODONE HYDROCHLORIDE AND ACETAMINOPHEN 1 TABLET: 10; 325 TABLET ORAL at 00:26

## 2018-03-30 RX ADMIN — AMOXICILLIN AND CLAVULANATE POTASSIUM 1 TABLET: 875; 125 TABLET, FILM COATED ORAL at 09:07

## 2018-03-30 RX ADMIN — BUSPIRONE HYDROCHLORIDE 15 MG: 10 TABLET ORAL at 09:06

## 2018-03-30 RX ADMIN — METOPROLOL TARTRATE 50 MG: 25 TABLET ORAL at 00:15

## 2018-03-30 RX ADMIN — VORTIOXETINE 20 MG: 10 TABLET, FILM COATED ORAL at 09:07

## 2018-03-30 RX ADMIN — IPRATROPIUM BROMIDE AND ALBUTEROL SULFATE 1 AMPULE: .5; 3 SOLUTION RESPIRATORY (INHALATION) at 13:37

## 2018-03-30 RX ADMIN — OXYCODONE HYDROCHLORIDE AND ACETAMINOPHEN 1 TABLET: 10; 325 TABLET ORAL at 10:03

## 2018-03-30 RX ADMIN — KETOROLAC TROMETHAMINE 30 MG: 30 INJECTION, SOLUTION INTRAMUSCULAR at 13:44

## 2018-03-30 RX ADMIN — Medication 10 ML: at 13:45

## 2018-03-30 RX ADMIN — METOPROLOL TARTRATE 50 MG: 25 TABLET ORAL at 09:03

## 2018-03-30 ASSESSMENT — PAIN SCALES - GENERAL
PAINLEVEL_OUTOF10: 8
PAINLEVEL_OUTOF10: 6
PAINLEVEL_OUTOF10: 8
PAINLEVEL_OUTOF10: 6
PAINLEVEL_OUTOF10: 3

## 2018-03-30 ASSESSMENT — PAIN DESCRIPTION - ONSET: ONSET: ON-GOING

## 2018-03-30 ASSESSMENT — PAIN DESCRIPTION - PAIN TYPE: TYPE: CHRONIC PAIN

## 2018-03-30 ASSESSMENT — PAIN DESCRIPTION - LOCATION: LOCATION: GENERALIZED

## 2018-03-31 LAB
ASPERGILLUS FUMIGATUS #1: NORMAL
ASPERGILLUS FUMIGATUS #6: NORMAL
AUREOBASIDIUM PULLULANS: NORMAL
MICROPOLYSPORA FAENI: NORMAL
PIGEON SERUM ABS: NORMAL
THERMOACTINOMYCES VULGARIS #1: NORMAL

## 2018-04-22 PROBLEM — J10.1 INFLUENZA A: Status: RESOLVED | Noted: 2018-03-23 | Resolved: 2018-04-22

## 2018-04-30 LAB
FUNGUS (MYCOLOGY) CULTURE: NORMAL
FUNGUS (MYCOLOGY) CULTURE: NORMAL
FUNGUS STAIN: NORMAL
FUNGUS STAIN: NORMAL

## 2018-05-15 LAB
AFB CULTURE (MYCOBACTERIA): NORMAL
AFB SMEAR: NORMAL

## 2018-06-18 ENCOUNTER — HOSPITAL ENCOUNTER (OUTPATIENT)
Age: 53
Discharge: HOME | End: 2018-06-20
Payer: COMMERCIAL

## 2018-06-18 ENCOUNTER — OFFICE VISIT (OUTPATIENT)
Dept: PHYSICAL MEDICINE AND REHAB | Age: 53
End: 2018-06-18
Payer: COMMERCIAL

## 2018-06-18 VITALS
HEIGHT: 61 IN | OXYGEN SATURATION: 92 % | BODY MASS INDEX: 41.54 KG/M2 | SYSTOLIC BLOOD PRESSURE: 118 MMHG | WEIGHT: 220 LBS | DIASTOLIC BLOOD PRESSURE: 78 MMHG | HEART RATE: 80 BPM

## 2018-06-18 DIAGNOSIS — R51.9 CHRONIC DAILY HEADACHE: ICD-10-CM

## 2018-06-18 DIAGNOSIS — R51.9 TEMPORAL PAIN: ICD-10-CM

## 2018-06-18 DIAGNOSIS — M54.81 BILATERAL OCCIPITAL NEURALGIA: Primary | ICD-10-CM

## 2018-06-18 DIAGNOSIS — G43.719 INTRACTABLE CHRONIC MIGRAINE WITHOUT AURA AND WITHOUT STATUS MIGRAINOSUS: ICD-10-CM

## 2018-06-18 LAB — SEDIMENTATION RATE, ERYTHROCYTE: 24 MM/HR (ref 0–20)

## 2018-06-18 PROCEDURE — G8427 DOCREV CUR MEDS BY ELIG CLIN: HCPCS | Performed by: PHYSICAL MEDICINE & REHABILITATION

## 2018-06-18 PROCEDURE — 64405 NJX AA&/STRD GR OCPL NRV: CPT | Performed by: PHYSICAL MEDICINE & REHABILITATION

## 2018-06-18 PROCEDURE — G8417 CALC BMI ABV UP PARAM F/U: HCPCS | Performed by: PHYSICAL MEDICINE & REHABILITATION

## 2018-06-18 PROCEDURE — 85651 RBC SED RATE NONAUTOMATED: CPT

## 2018-06-18 PROCEDURE — 99204 OFFICE O/P NEW MOD 45 MIN: CPT | Performed by: PHYSICAL MEDICINE & REHABILITATION

## 2018-06-18 PROCEDURE — 3017F COLORECTAL CA SCREEN DOC REV: CPT | Performed by: PHYSICAL MEDICINE & REHABILITATION

## 2018-06-18 PROCEDURE — 1036F TOBACCO NON-USER: CPT | Performed by: PHYSICAL MEDICINE & REHABILITATION

## 2018-06-18 PROCEDURE — 36415 COLL VENOUS BLD VENIPUNCTURE: CPT

## 2018-06-18 RX ORDER — TRIAMCINOLONE ACETONIDE 40 MG/ML
40 INJECTION, SUSPENSION INTRA-ARTICULAR; INTRAMUSCULAR ONCE
Status: COMPLETED | OUTPATIENT
Start: 2018-06-18 | End: 2018-06-18

## 2018-06-18 RX ORDER — TIZANIDINE 4 MG/1
4 TABLET ORAL 3 TIMES DAILY
Qty: 90 TABLET | Refills: 1 | Status: SHIPPED | OUTPATIENT
Start: 2018-06-18 | End: 2018-07-19 | Stop reason: SDUPTHER

## 2018-06-18 RX ORDER — BUPIVACAINE HYDROCHLORIDE 2.5 MG/ML
2 INJECTION, SOLUTION EPIDURAL; INFILTRATION; INTRACAUDAL ONCE
Status: COMPLETED | OUTPATIENT
Start: 2018-06-18 | End: 2018-06-18

## 2018-06-18 RX ADMIN — TRIAMCINOLONE ACETONIDE 40 MG: 40 INJECTION, SUSPENSION INTRA-ARTICULAR; INTRAMUSCULAR at 12:16

## 2018-06-18 RX ADMIN — BUPIVACAINE HYDROCHLORIDE 5 MG: 2.5 INJECTION, SOLUTION EPIDURAL; INFILTRATION; INTRACAUDAL at 12:17

## 2018-06-20 ENCOUNTER — TELEPHONE (OUTPATIENT)
Dept: PHYSICAL MEDICINE AND REHAB | Age: 53
End: 2018-06-20

## 2018-07-19 ENCOUNTER — OFFICE VISIT (OUTPATIENT)
Dept: PHYSICAL MEDICINE AND REHAB | Age: 53
End: 2018-07-19
Payer: COMMERCIAL

## 2018-07-19 VITALS
HEART RATE: 75 BPM | SYSTOLIC BLOOD PRESSURE: 138 MMHG | DIASTOLIC BLOOD PRESSURE: 88 MMHG | OXYGEN SATURATION: 98 % | WEIGHT: 212 LBS | BODY MASS INDEX: 40.02 KG/M2 | HEIGHT: 61 IN

## 2018-07-19 DIAGNOSIS — G89.29 CHRONIC NONINTRACTABLE HEADACHE, UNSPECIFIED HEADACHE TYPE: ICD-10-CM

## 2018-07-19 DIAGNOSIS — M79.18 CERVICAL MYOFASCIAL PAIN SYNDROME: Primary | ICD-10-CM

## 2018-07-19 DIAGNOSIS — M54.81 BILATERAL OCCIPITAL NEURALGIA: ICD-10-CM

## 2018-07-19 DIAGNOSIS — R51.9 CHRONIC NONINTRACTABLE HEADACHE, UNSPECIFIED HEADACHE TYPE: ICD-10-CM

## 2018-07-19 DIAGNOSIS — E66.01 OBESITY, CLASS III, BMI 40-49.9 (MORBID OBESITY) (HCC): ICD-10-CM

## 2018-07-19 PROCEDURE — 1036F TOBACCO NON-USER: CPT | Performed by: PHYSICAL MEDICINE & REHABILITATION

## 2018-07-19 PROCEDURE — G8417 CALC BMI ABV UP PARAM F/U: HCPCS | Performed by: PHYSICAL MEDICINE & REHABILITATION

## 2018-07-19 PROCEDURE — 3017F COLORECTAL CA SCREEN DOC REV: CPT | Performed by: PHYSICAL MEDICINE & REHABILITATION

## 2018-07-19 PROCEDURE — 99214 OFFICE O/P EST MOD 30 MIN: CPT | Performed by: PHYSICAL MEDICINE & REHABILITATION

## 2018-07-19 PROCEDURE — G8427 DOCREV CUR MEDS BY ELIG CLIN: HCPCS | Performed by: PHYSICAL MEDICINE & REHABILITATION

## 2018-07-19 RX ORDER — TIZANIDINE 4 MG/1
4 TABLET ORAL 3 TIMES DAILY
Qty: 90 TABLET | Refills: 1 | Status: SHIPPED | OUTPATIENT
Start: 2018-07-19 | End: 2018-10-18 | Stop reason: SDUPTHER

## 2018-07-19 RX ORDER — BUPROPION HYDROCHLORIDE 150 MG/1
150 TABLET ORAL EVERY MORNING
COMMUNITY
End: 2019-03-30

## 2018-07-19 NOTE — PROGRESS NOTES
anger    Dexlansoprazole Nausea Only    Diovan [Valsartan] Other (See Comments)     Cough, sore throat    Ditropan [Oxybutynin] Other (See Comments)     hoarsness    Lunesta [Eszopiclone] Other (See Comments)     Bad dreams    Molds & Smuts     Norvasc [Amlodipine Besylate] Other (See Comments)     cough    Sular [Nisoldipine Er] Other (See Comments)     Cough      Tape Logan Loffler Tape]      rash    Dextromethorphan Polistirex Er Rash    Levaquin [Levofloxacin] Rash    Other Rash     Strawberries, wheat, peppers    Questran [Cholestyramine] Rash    Yellow Dyes (Non-Tartrazine) Rash     Yellow dye #6, (#11 & #5 if combined)       ROS: No new weakness, paresthesia, incontinence of bowel or bladder, falls or gait dysfunction. Physical Exam: Blood pressure 138/88, pulse 75, height 5' 1\" (1.549 m), weight 212 lb (96.2 kg), SpO2 98 %. General: The patient is in no apparent distress. Body habitus is obese. HEENT: No rhinorrhea, sneezing, yawning, or lacrimation. No scleral icterus or conjunctival injection. SKIN: No piloerection. No track marks. No rash. Normal turgor. No erythema or ecchymosis. Psychological: Mood and affect are appropriate. Hygiene is appropriate. Cardiovascular:  Heart is regular rate and rhythm. Peripheral pulses are 2+ at the dorsalis pedis, posterior tibial and radial arteries. There is no edema. Respiratory: Respirations are regular and unlabored. There is no cyanosis. Lymphatic: There is no cervical or inguinal lymphadenopathy. Gastrointestinal: Soft abdomen, non-tender. No pulsating abdominal mass. Genitourinary: No costovertebral angle tenderness. MSK: There is no joint effusion, deformity, instability, swelling, erythema or warmth. AROM is full in the spine and extremities. Spinal curvatures are normal.       Neurologic: Awake, alert and oriented in three planes. Speech is fluent. No facial weakness. Tongue is midline. Hearing is intact for conversation.   Pupils are

## 2018-10-18 ENCOUNTER — OFFICE VISIT (OUTPATIENT)
Dept: PHYSICAL MEDICINE AND REHAB | Age: 53
End: 2018-10-18
Payer: COMMERCIAL

## 2018-10-18 VITALS
BODY MASS INDEX: 38.14 KG/M2 | HEIGHT: 61 IN | WEIGHT: 202 LBS | DIASTOLIC BLOOD PRESSURE: 102 MMHG | HEART RATE: 93 BPM | SYSTOLIC BLOOD PRESSURE: 137 MMHG

## 2018-10-18 DIAGNOSIS — R51.9 CHRONIC DAILY HEADACHE: Primary | ICD-10-CM

## 2018-10-18 DIAGNOSIS — G43.711 INTRACTABLE CHRONIC MIGRAINE WITHOUT AURA AND WITH STATUS MIGRAINOSUS: ICD-10-CM

## 2018-10-18 PROCEDURE — 3017F COLORECTAL CA SCREEN DOC REV: CPT | Performed by: PHYSICAL MEDICINE & REHABILITATION

## 2018-10-18 PROCEDURE — G8484 FLU IMMUNIZE NO ADMIN: HCPCS | Performed by: PHYSICAL MEDICINE & REHABILITATION

## 2018-10-18 PROCEDURE — 96372 THER/PROPH/DIAG INJ SC/IM: CPT | Performed by: PHYSICAL MEDICINE & REHABILITATION

## 2018-10-18 PROCEDURE — 99213 OFFICE O/P EST LOW 20 MIN: CPT | Performed by: PHYSICAL MEDICINE & REHABILITATION

## 2018-10-18 PROCEDURE — 1036F TOBACCO NON-USER: CPT | Performed by: PHYSICAL MEDICINE & REHABILITATION

## 2018-10-18 PROCEDURE — G8417 CALC BMI ABV UP PARAM F/U: HCPCS | Performed by: PHYSICAL MEDICINE & REHABILITATION

## 2018-10-18 PROCEDURE — G8427 DOCREV CUR MEDS BY ELIG CLIN: HCPCS | Performed by: PHYSICAL MEDICINE & REHABILITATION

## 2018-10-18 RX ORDER — METOPROLOL SUCCINATE 200 MG/1
200 TABLET, EXTENDED RELEASE ORAL DAILY
Qty: 30 TABLET | Refills: 0 | Status: SHIPPED | OUTPATIENT
Start: 2018-10-18 | End: 2019-03-30

## 2018-10-18 RX ORDER — TIZANIDINE 4 MG/1
4 TABLET ORAL 3 TIMES DAILY
Qty: 90 TABLET | Refills: 1 | Status: SHIPPED
Start: 2018-10-18 | End: 2021-11-22

## 2018-10-18 RX ORDER — RIZATRIPTAN BENZOATE 5 MG/1
5 TABLET ORAL
Qty: 10 TABLET | Refills: 2 | Status: SHIPPED | OUTPATIENT
Start: 2018-10-18 | End: 2020-01-02

## 2018-10-18 RX ORDER — KETOROLAC TROMETHAMINE 15 MG/ML
15 INJECTION, SOLUTION INTRAMUSCULAR; INTRAVENOUS ONCE
Status: COMPLETED | OUTPATIENT
Start: 2018-10-18 | End: 2018-10-18

## 2018-10-18 RX ADMIN — KETOROLAC TROMETHAMINE 15 MG: 15 INJECTION, SOLUTION INTRAMUSCULAR; INTRAVENOUS at 14:16

## 2018-11-22 ENCOUNTER — APPOINTMENT (OUTPATIENT)
Dept: CT IMAGING | Age: 53
End: 2018-11-22
Payer: COMMERCIAL

## 2018-11-22 ENCOUNTER — HOSPITAL ENCOUNTER (EMERGENCY)
Age: 53
Discharge: HOME | End: 2018-11-22
Attending: EMERGENCY MEDICINE
Payer: COMMERCIAL

## 2018-11-22 VITALS
OXYGEN SATURATION: 98 % | HEART RATE: 96 BPM | RESPIRATION RATE: 18 BRPM | WEIGHT: 220 LBS | DIASTOLIC BLOOD PRESSURE: 72 MMHG | BODY MASS INDEX: 41.54 KG/M2 | HEIGHT: 61 IN | SYSTOLIC BLOOD PRESSURE: 168 MMHG | TEMPERATURE: 98.1 F

## 2018-11-22 DIAGNOSIS — R51.9 NONINTRACTABLE HEADACHE, UNSPECIFIED CHRONICITY PATTERN, UNSPECIFIED HEADACHE TYPE: ICD-10-CM

## 2018-11-22 DIAGNOSIS — S39.012A STRAIN OF LUMBAR REGION, INITIAL ENCOUNTER: ICD-10-CM
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
## 2018-11-22 LAB
CO2: 30 MMOL/L (ref 22–29)
GFR AFRICAN AMERICAN: >60
GFR NON-AFRICAN AMERICAN: >60 ML/MIN/1.73
GLUCOSE BLD-MCNC: 136 MG/DL (ref 74–99)
POC ANION GAP: 8 MMOL/L (ref 7–16)
POC BUN: 15 MG/DL (ref 8–23)
POC CHLORIDE: 105 MMOL/L (ref 100–108)
POC CREATININE: 0.9 MG/DL (ref 0.5–1)
POC POTASSIUM: 4.3 MMOL/L (ref 3.5–5)
POC SODIUM: 143 MMOL/L (ref 132–146)

## 2018-11-22 PROCEDURE — 80051 ELECTROLYTE PANEL: CPT

## 2018-11-22 PROCEDURE — 84520 ASSAY OF UREA NITROGEN: CPT

## 2018-11-22 PROCEDURE — 82565 ASSAY OF CREATININE: CPT

## 2018-11-22 PROCEDURE — 99284 EMERGENCY DEPT VISIT MOD MDM: CPT

## 2018-11-22 PROCEDURE — 96372 THER/PROPH/DIAG INJ SC/IM: CPT

## 2018-11-22 PROCEDURE — 6360000004 HC RX CONTRAST MEDICATION: Performed by: RADIOLOGY

## 2018-11-22 PROCEDURE — 82947 ASSAY GLUCOSE BLOOD QUANT: CPT

## 2018-11-22 PROCEDURE — 74177 CT ABD & PELVIS W/CONTRAST: CPT

## 2018-11-22 PROCEDURE — 72131 CT LUMBAR SPINE W/O DYE: CPT

## 2018-11-22 PROCEDURE — 6360000002 HC RX W HCPCS: Performed by: STUDENT IN AN ORGANIZED HEALTH CARE EDUCATION/TRAINING PROGRAM

## 2018-11-22 RX ORDER — KETOROLAC TROMETHAMINE 30 MG/ML
60 INJECTION, SOLUTION INTRAMUSCULAR; INTRAVENOUS ONCE
Status: COMPLETED | OUTPATIENT
Start: 2018-11-22 | End: 2018-11-22

## 2018-11-22 RX ADMIN — KETOROLAC TROMETHAMINE 60 MG: 30 INJECTION, SOLUTION INTRAMUSCULAR; INTRAVENOUS at 21:11

## 2018-11-22 RX ADMIN — IOPAMIDOL 80 ML: 755 INJECTION, SOLUTION INTRAVENOUS at 21:45

## 2018-11-22 ASSESSMENT — PAIN DESCRIPTION - PAIN TYPE
TYPE: ACUTE PAIN
TYPE: ACUTE PAIN

## 2018-11-22 ASSESSMENT — PAIN DESCRIPTION - LOCATION: LOCATION: GENERALIZED

## 2018-11-22 ASSESSMENT — ENCOUNTER SYMPTOMS
NAUSEA: 0
CONSTIPATION: 0
ABDOMINAL PAIN: 1
SORE THROAT: 0
VOMITING: 0
CHEST TIGHTNESS: 0
SHORTNESS OF BREATH: 0
WHEEZING: 0
BLOOD IN STOOL: 0
DIARRHEA: 0
BACK PAIN: 1
COUGH: 0
RHINORRHEA: 0

## 2018-11-22 ASSESSMENT — PAIN SCALES - GENERAL
PAINLEVEL_OUTOF10: 10
PAINLEVEL_OUTOF10: 7
PAINLEVEL_OUTOF10: 10

## 2019-02-26 ENCOUNTER — HOSPITAL ENCOUNTER (OUTPATIENT)
Dept: MAMMOGRAPHY | Age: 54
Discharge: HOME | End: 2019-02-28
Payer: COMMERCIAL

## 2019-02-26 DIAGNOSIS — Z12.39 BREAST CANCER SCREENING: ICD-10-CM

## 2019-02-26 PROCEDURE — 77067 SCR MAMMO BI INCL CAD: CPT

## 2019-03-20 ENCOUNTER — HOSPITAL ENCOUNTER (OUTPATIENT)
Dept: GENERAL RADIOLOGY | Age: 54
Discharge: HOME | End: 2019-03-22
Payer: COMMERCIAL

## 2019-03-20 ENCOUNTER — HOSPITAL ENCOUNTER (OUTPATIENT)
Age: 54
Discharge: HOME | End: 2019-03-22
Payer: COMMERCIAL

## 2019-03-20 DIAGNOSIS — M54.6 THORACIC BACK PAIN, UNSPECIFIED BACK PAIN LATERALITY, UNSPECIFIED CHRONICITY: ICD-10-CM

## 2019-03-20 DIAGNOSIS — M54.2 NECK PAIN: ICD-10-CM

## 2019-03-20 DIAGNOSIS — M54.9 BACK PAIN, UNSPECIFIED BACK LOCATION, UNSPECIFIED BACK PAIN LATERALITY, UNSPECIFIED CHRONICITY: ICD-10-CM

## 2019-03-20 PROCEDURE — 72100 X-RAY EXAM L-S SPINE 2/3 VWS: CPT

## 2019-03-20 PROCEDURE — 72050 X-RAY EXAM NECK SPINE 4/5VWS: CPT

## 2019-03-20 PROCEDURE — 72070 X-RAY EXAM THORAC SPINE 2VWS: CPT

## 2019-03-30 ENCOUNTER — HOSPITAL ENCOUNTER (OUTPATIENT)
Age: 54
Setting detail: OBSERVATION
Discharge: HOME | End: 2019-04-01
Attending: EMERGENCY MEDICINE | Admitting: INTERNAL MEDICINE
Payer: COMMERCIAL

## 2019-03-30 ENCOUNTER — APPOINTMENT (OUTPATIENT)
Dept: CT IMAGING | Age: 54
End: 2019-03-30
Payer: COMMERCIAL

## 2019-03-30 DIAGNOSIS — E86.0 DEHYDRATION: ICD-10-CM

## 2019-03-30 DIAGNOSIS — R19.7 DIARRHEA, UNSPECIFIED TYPE: ICD-10-CM

## 2019-03-30 DIAGNOSIS — E87.20 LACTIC ACIDOSIS: ICD-10-CM

## 2019-03-30 DIAGNOSIS — R11.2 NAUSEA AND VOMITING, INTRACTABILITY OF VOMITING NOT SPECIFIED, UNSPECIFIED VOMITING TYPE: Primary | ICD-10-CM

## 2019-03-30 LAB
ALBUMIN SERPL-MCNC: 3.5 G/DL (ref 3.5–5.2)
ALP BLD-CCNC: 75 U/L (ref 35–104)
ALT SERPL-CCNC: 27 U/L (ref 0–32)
ANION GAP SERPL CALCULATED.3IONS-SCNC: 14 MMOL/L (ref 7–16)
AST SERPL-CCNC: 32 U/L (ref 0–31)
BACTERIA: ABNORMAL /HPF
BASOPHILS ABSOLUTE: 0.03 E9/L (ref 0–0.2)
BASOPHILS RELATIVE PERCENT: 0.4 % (ref 0–2)
BILIRUB SERPL-MCNC: 0.4 MG/DL (ref 0–1.2)
BILIRUBIN URINE: ABNORMAL
BLOOD, URINE: NEGATIVE
BUN BLDV-MCNC: 22 MG/DL (ref 6–20)
CALCIUM SERPL-MCNC: 8.3 MG/DL (ref 8.6–10.2)
CHLORIDE BLD-SCNC: 99 MMOL/L (ref 98–107)
CLARITY: ABNORMAL
CO2: 20 MMOL/L (ref 22–29)
COLOR: YELLOW
CREAT SERPL-MCNC: 0.7 MG/DL (ref 0.5–1)
EOSINOPHILS ABSOLUTE: 0.01 E9/L (ref 0.05–0.5)
EOSINOPHILS RELATIVE PERCENT: 0.1 % (ref 0–6)
EPITHELIAL CELLS, UA: ABNORMAL /HPF
GFR AFRICAN AMERICAN: >60
GFR NON-AFRICAN AMERICAN: >60 ML/MIN/1.73
GLUCOSE BLD-MCNC: 126 MG/DL (ref 74–99)
GLUCOSE URINE: NEGATIVE MG/DL
HCT VFR BLD CALC: 47.4 % (ref 34–48)
HEMOGLOBIN: 15.2 G/DL (ref 11.5–15.5)
IMMATURE GRANULOCYTES #: 0.04 E9/L
IMMATURE GRANULOCYTES %: 0.6 % (ref 0–5)
KETONES, URINE: NEGATIVE MG/DL
LACTIC ACID: 1.8 MMOL/L (ref 0.5–2.2)
LACTIC ACID: 2.2 MMOL/L (ref 0.5–2.2)
LACTIC ACID: 3.5 MMOL/L (ref 0.5–2.2)
LACTIC ACID: 3.7 MMOL/L (ref 0.5–2.2)
LACTIC ACID: 3.9 MMOL/L (ref 0.5–2.2)
LEUKOCYTE ESTERASE, URINE: NEGATIVE
LIPASE: 17 U/L (ref 13–60)
LYMPHOCYTES ABSOLUTE: 0.66 E9/L (ref 1.5–4)
LYMPHOCYTES RELATIVE PERCENT: 9.5 % (ref 20–42)
MCH RBC QN AUTO: 29.2 PG (ref 26–35)
MCHC RBC AUTO-ENTMCNC: 32.1 % (ref 32–34.5)
MCV RBC AUTO: 91 FL (ref 80–99.9)
MONOCYTES ABSOLUTE: 0.5 E9/L (ref 0.1–0.95)
MONOCYTES RELATIVE PERCENT: 7.2 % (ref 2–12)
NEUTROPHILS ABSOLUTE: 5.71 E9/L (ref 1.8–7.3)
NEUTROPHILS RELATIVE PERCENT: 82.2 % (ref 43–80)
NITRITE, URINE: NEGATIVE
PDW BLD-RTO: 14.6 FL (ref 11.5–15)
PH UA: 5 (ref 5–9)
PLATELET # BLD: 261 E9/L (ref 130–450)
PMV BLD AUTO: 11.1 FL (ref 7–12)
POTASSIUM REFLEX MAGNESIUM: 4.6 MMOL/L (ref 3.5–5)
PROTEIN UA: 30 MG/DL
RBC # BLD: 5.21 E12/L (ref 3.5–5.5)
RBC UA: ABNORMAL /HPF (ref 0–2)
REASON FOR REJECTION: NORMAL
REJECTED TEST: NORMAL
SODIUM BLD-SCNC: 133 MMOL/L (ref 132–146)
SPECIFIC GRAVITY UA: >=1.03 (ref 1–1.03)
TOTAL PROTEIN: 6.5 G/DL (ref 6.4–8.3)
TROPONIN: <0.01 NG/ML (ref 0–0.03)
UROBILINOGEN, URINE: 0.2 E.U./DL
WBC # BLD: 7 E9/L (ref 4.5–11.5)
WBC UA: ABNORMAL /HPF (ref 0–5)

## 2019-03-30 PROCEDURE — 96360 HYDRATION IV INFUSION INIT: CPT

## 2019-03-30 PROCEDURE — 36415 COLL VENOUS BLD VENIPUNCTURE: CPT

## 2019-03-30 PROCEDURE — 87077 CULTURE AEROBIC IDENTIFY: CPT

## 2019-03-30 PROCEDURE — G0378 HOSPITAL OBSERVATION PER HR: HCPCS

## 2019-03-30 PROCEDURE — 87186 SC STD MICRODIL/AGAR DIL: CPT

## 2019-03-30 PROCEDURE — 81001 URINALYSIS AUTO W/SCOPE: CPT

## 2019-03-30 PROCEDURE — 74177 CT ABD & PELVIS W/CONTRAST: CPT

## 2019-03-30 PROCEDURE — 87088 URINE BACTERIA CULTURE: CPT

## 2019-03-30 PROCEDURE — 6370000000 HC RX 637 (ALT 250 FOR IP): Performed by: INTERNAL MEDICINE

## 2019-03-30 PROCEDURE — 83690 ASSAY OF LIPASE: CPT

## 2019-03-30 PROCEDURE — 85025 COMPLETE CBC W/AUTO DIFF WBC: CPT

## 2019-03-30 PROCEDURE — 6360000004 HC RX CONTRAST MEDICATION: Performed by: RADIOLOGY

## 2019-03-30 PROCEDURE — 99285 EMERGENCY DEPT VISIT HI MDM: CPT

## 2019-03-30 PROCEDURE — 87045 FECES CULTURE AEROBIC BACT: CPT

## 2019-03-30 PROCEDURE — 96361 HYDRATE IV INFUSION ADD-ON: CPT

## 2019-03-30 PROCEDURE — 80053 COMPREHEN METABOLIC PANEL: CPT

## 2019-03-30 PROCEDURE — 2580000003 HC RX 258: Performed by: EMERGENCY MEDICINE

## 2019-03-30 PROCEDURE — 6360000002 HC RX W HCPCS: Performed by: EMERGENCY MEDICINE

## 2019-03-30 PROCEDURE — 93005 ELECTROCARDIOGRAM TRACING: CPT | Performed by: EMERGENCY MEDICINE

## 2019-03-30 PROCEDURE — 96372 THER/PROPH/DIAG INJ SC/IM: CPT

## 2019-03-30 PROCEDURE — 83605 ASSAY OF LACTIC ACID: CPT

## 2019-03-30 PROCEDURE — 87324 CLOSTRIDIUM AG IA: CPT

## 2019-03-30 PROCEDURE — 2580000003 HC RX 258: Performed by: INTERNAL MEDICINE

## 2019-03-30 PROCEDURE — 84484 ASSAY OF TROPONIN QUANT: CPT

## 2019-03-30 RX ORDER — CHLORDIAZEPOXIDE HYDROCHLORIDE AND CLIDINIUM BROMIDE 5; 2.5 MG/1; MG/1
1 CAPSULE ORAL
Status: DISCONTINUED | OUTPATIENT
Start: 2019-03-31 | End: 2019-04-01 | Stop reason: HOSPADM

## 2019-03-30 RX ORDER — LOSARTAN POTASSIUM 50 MG/1
100 TABLET ORAL NIGHTLY
Status: DISCONTINUED | OUTPATIENT
Start: 2019-03-30 | End: 2019-04-01 | Stop reason: HOSPADM

## 2019-03-30 RX ORDER — METOPROLOL TARTRATE 50 MG/1
50 TABLET, FILM COATED ORAL 2 TIMES DAILY
Status: DISCONTINUED | OUTPATIENT
Start: 2019-03-30 | End: 2019-04-01 | Stop reason: HOSPADM

## 2019-03-30 RX ORDER — ALBUTEROL SULFATE 0.63 MG/3ML
1 SOLUTION RESPIRATORY (INHALATION) EVERY 6 HOURS PRN
Status: DISCONTINUED | OUTPATIENT
Start: 2019-03-30 | End: 2019-04-01 | Stop reason: HOSPADM

## 2019-03-30 RX ORDER — ARIPIPRAZOLE 15 MG/1
30 TABLET ORAL DAILY
Status: DISCONTINUED | OUTPATIENT
Start: 2019-03-30 | End: 2019-04-01 | Stop reason: HOSPADM

## 2019-03-30 RX ORDER — SODIUM CHLORIDE 0.9 % (FLUSH) 0.9 %
10 SYRINGE (ML) INJECTION PRN
Status: DISCONTINUED | OUTPATIENT
Start: 2019-03-30 | End: 2019-04-01 | Stop reason: HOSPADM

## 2019-03-30 RX ORDER — PANTOPRAZOLE SODIUM 40 MG/1
40 TABLET, DELAYED RELEASE ORAL 2 TIMES DAILY
Status: DISCONTINUED | OUTPATIENT
Start: 2019-03-30 | End: 2019-04-01 | Stop reason: HOSPADM

## 2019-03-30 RX ORDER — LOSARTAN POTASSIUM AND HYDROCHLOROTHIAZIDE 25; 100 MG/1; MG/1
1 TABLET ORAL NIGHTLY
Status: DISCONTINUED | OUTPATIENT
Start: 2019-03-30 | End: 2019-03-30 | Stop reason: CLARIF

## 2019-03-30 RX ORDER — GABAPENTIN 300 MG/1
600 CAPSULE ORAL NIGHTLY
Status: DISCONTINUED | OUTPATIENT
Start: 2019-03-30 | End: 2019-04-01 | Stop reason: HOSPADM

## 2019-03-30 RX ORDER — ALPRAZOLAM 1 MG/1
2 TABLET ORAL 3 TIMES DAILY PRN
Status: DISCONTINUED | OUTPATIENT
Start: 2019-03-30 | End: 2019-04-01 | Stop reason: HOSPADM

## 2019-03-30 RX ORDER — BUSPIRONE HYDROCHLORIDE 7.5 MG/1
15 TABLET ORAL 2 TIMES DAILY
Status: DISCONTINUED | OUTPATIENT
Start: 2019-03-30 | End: 2019-04-01 | Stop reason: HOSPADM

## 2019-03-30 RX ORDER — CLONIDINE HYDROCHLORIDE 0.2 MG/1
0.2 TABLET ORAL NIGHTLY
Status: DISCONTINUED | OUTPATIENT
Start: 2019-03-30 | End: 2019-04-01 | Stop reason: HOSPADM

## 2019-03-30 RX ORDER — 0.9 % SODIUM CHLORIDE 0.9 %
1000 INTRAVENOUS SOLUTION INTRAVENOUS ONCE
Status: COMPLETED | OUTPATIENT
Start: 2019-03-30 | End: 2019-03-30

## 2019-03-30 RX ORDER — MELOXICAM 7.5 MG/1
15 TABLET ORAL NIGHTLY
Status: DISCONTINUED | OUTPATIENT
Start: 2019-03-30 | End: 2019-04-01 | Stop reason: HOSPADM

## 2019-03-30 RX ORDER — SODIUM CHLORIDE 0.9 % (FLUSH) 0.9 %
10 SYRINGE (ML) INJECTION EVERY 12 HOURS SCHEDULED
Status: DISCONTINUED | OUTPATIENT
Start: 2019-03-30 | End: 2019-04-01 | Stop reason: HOSPADM

## 2019-03-30 RX ORDER — METOPROLOL TARTRATE 100 MG/1
100 TABLET ORAL 2 TIMES DAILY
COMMUNITY
End: 2022-07-05 | Stop reason: SDUPTHER

## 2019-03-30 RX ORDER — ONDANSETRON 2 MG/ML
4 INJECTION INTRAMUSCULAR; INTRAVENOUS EVERY 6 HOURS PRN
Status: DISCONTINUED | OUTPATIENT
Start: 2019-03-30 | End: 2019-04-01 | Stop reason: HOSPADM

## 2019-03-30 RX ORDER — MONTELUKAST SODIUM 10 MG/1
10 TABLET ORAL NIGHTLY
Status: DISCONTINUED | OUTPATIENT
Start: 2019-03-30 | End: 2019-04-01 | Stop reason: HOSPADM

## 2019-03-30 RX ORDER — FLUTICASONE PROPIONATE 110 UG/1
2 AEROSOL, METERED RESPIRATORY (INHALATION) 2 TIMES DAILY
Status: DISCONTINUED | OUTPATIENT
Start: 2019-03-30 | End: 2019-04-01 | Stop reason: HOSPADM

## 2019-03-30 RX ORDER — ACETAMINOPHEN 325 MG/1
650 TABLET ORAL EVERY 4 HOURS PRN
Status: DISCONTINUED | OUTPATIENT
Start: 2019-03-30 | End: 2019-04-01 | Stop reason: HOSPADM

## 2019-03-30 RX ORDER — OXYCODONE AND ACETAMINOPHEN 10; 325 MG/1; MG/1
1 TABLET ORAL EVERY 6 HOURS PRN
Status: DISCONTINUED | OUTPATIENT
Start: 2019-03-30 | End: 2019-04-01 | Stop reason: HOSPADM

## 2019-03-30 RX ORDER — SODIUM CHLORIDE 9 MG/ML
INJECTION, SOLUTION INTRAVENOUS CONTINUOUS
Status: DISCONTINUED | OUTPATIENT
Start: 2019-03-30 | End: 2019-04-01 | Stop reason: HOSPADM

## 2019-03-30 RX ORDER — OLOPATADINE HYDROCHLORIDE 1 MG/ML
1 SOLUTION/ DROPS OPHTHALMIC 2 TIMES DAILY
Status: DISCONTINUED | OUTPATIENT
Start: 2019-03-30 | End: 2019-04-01 | Stop reason: HOSPADM

## 2019-03-30 RX ORDER — TIZANIDINE 4 MG/1
4 TABLET ORAL 3 TIMES DAILY PRN
Status: DISCONTINUED | OUTPATIENT
Start: 2019-03-30 | End: 2019-04-01 | Stop reason: HOSPADM

## 2019-03-30 RX ORDER — HYDROCHLOROTHIAZIDE 25 MG/1
25 TABLET ORAL NIGHTLY
Status: DISCONTINUED | OUTPATIENT
Start: 2019-03-30 | End: 2019-04-01 | Stop reason: HOSPADM

## 2019-03-30 RX ADMIN — MELOXICAM 15 MG: 7.5 TABLET ORAL at 20:03

## 2019-03-30 RX ADMIN — GABAPENTIN 600 MG: 300 CAPSULE ORAL at 20:04

## 2019-03-30 RX ADMIN — SODIUM CHLORIDE: 9 INJECTION, SOLUTION INTRAVENOUS at 20:16

## 2019-03-30 RX ADMIN — OXYCODONE AND ACETAMINOPHEN 1 TABLET: 10; 325 TABLET ORAL at 20:04

## 2019-03-30 RX ADMIN — VORTIOXETINE 20 MG: 10 TABLET, FILM COATED ORAL at 17:48

## 2019-03-30 RX ADMIN — TRIMETHOBENZAMIDE HYDROCHLORIDE 200 MG: 100 INJECTION INTRAMUSCULAR at 06:40

## 2019-03-30 RX ADMIN — ALPRAZOLAM 2 MG: 1 TABLET ORAL at 20:04

## 2019-03-30 RX ADMIN — PANTOPRAZOLE SODIUM 40 MG: 40 TABLET, DELAYED RELEASE ORAL at 20:04

## 2019-03-30 RX ADMIN — METOPROLOL TARTRATE 50 MG: 50 TABLET ORAL at 20:03

## 2019-03-30 RX ADMIN — OLOPATADINE HYDROCHLORIDE 1 DROP: 1 SOLUTION/ DROPS OPHTHALMIC at 20:16

## 2019-03-30 RX ADMIN — SODIUM CHLORIDE 1000 ML: 9 INJECTION, SOLUTION INTRAVENOUS at 08:21

## 2019-03-30 RX ADMIN — SODIUM CHLORIDE: 9 INJECTION, SOLUTION INTRAVENOUS at 17:32

## 2019-03-30 RX ADMIN — SODIUM CHLORIDE 1000 ML: 9 INJECTION, SOLUTION INTRAVENOUS at 11:07

## 2019-03-30 RX ADMIN — CLONIDINE HYDROCHLORIDE 0.2 MG: 0.2 TABLET ORAL at 20:05

## 2019-03-30 RX ADMIN — IOPAMIDOL 80 ML: 755 INJECTION, SOLUTION INTRAVENOUS at 09:00

## 2019-03-30 RX ADMIN — LOSARTAN POTASSIUM 100 MG: 50 TABLET ORAL at 20:04

## 2019-03-30 RX ADMIN — HYDROCHLOROTHIAZIDE 25 MG: 25 TABLET ORAL at 20:04

## 2019-03-30 RX ADMIN — ARIPIPRAZOLE 30 MG: 15 TABLET ORAL at 17:48

## 2019-03-30 RX ADMIN — SODIUM CHLORIDE 1000 ML: 900 INJECTION, SOLUTION INTRAVENOUS at 06:54

## 2019-03-30 RX ADMIN — MONTELUKAST 10 MG: 10 TABLET, FILM COATED ORAL at 20:04

## 2019-03-30 RX ADMIN — IOHEXOL 50 ML: 240 INJECTION, SOLUTION INTRATHECAL; INTRAVASCULAR; INTRAVENOUS; ORAL at 09:00

## 2019-03-30 ASSESSMENT — PAIN DESCRIPTION - PROGRESSION
CLINICAL_PROGRESSION: NOT CHANGED
CLINICAL_PROGRESSION: NOT CHANGED

## 2019-03-30 ASSESSMENT — PAIN DESCRIPTION - DESCRIPTORS
DESCRIPTORS: ACHING;DISCOMFORT;SORE
DESCRIPTORS: ACHING;DISCOMFORT;SORE

## 2019-03-30 ASSESSMENT — PAIN DESCRIPTION - LOCATION
LOCATION: GENERALIZED
LOCATION: GENERALIZED

## 2019-03-30 ASSESSMENT — PAIN DESCRIPTION - FREQUENCY
FREQUENCY: INTERMITTENT
FREQUENCY: INTERMITTENT

## 2019-03-30 ASSESSMENT — PAIN DESCRIPTION - PAIN TYPE
TYPE: CHRONIC PAIN
TYPE: CHRONIC PAIN

## 2019-03-30 ASSESSMENT — PAIN - FUNCTIONAL ASSESSMENT
PAIN_FUNCTIONAL_ASSESSMENT: PREVENTS OR INTERFERES SOME ACTIVE ACTIVITIES AND ADLS
PAIN_FUNCTIONAL_ASSESSMENT: PREVENTS OR INTERFERES SOME ACTIVE ACTIVITIES AND ADLS

## 2019-03-30 ASSESSMENT — PAIN SCALES - GENERAL
PAINLEVEL_OUTOF10: 8
PAINLEVEL_OUTOF10: 8
PAINLEVEL_OUTOF10: 6

## 2019-03-30 ASSESSMENT — PAIN DESCRIPTION - ORIENTATION: ORIENTATION: OTHER (COMMENT)

## 2019-03-30 ASSESSMENT — PAIN DESCRIPTION - ONSET: ONSET: ON-GOING

## 2019-03-30 NOTE — ED PROVIDER NOTES
HPI:  3/30/19, Time: 6:19 AM         Ovidio Garza is a 47 y.o. female presenting to the ED for nausea, vomiting, diarrhea, and abdominal pain, beginning about 18 hours ago. The complaint has been persistent, moderate in severity, and worsened by nothing. Patient states that she was in good health until yesterday afternoon when she began to experience nausea, vomiting, diarrhea, and abdominal pain. Patient states that the nausea started 1st and then she had around 7 episodes of nonbloody, nonbilious vomiting throughout the evening. She also began to have some diarrhea which she describes as loose, watery stools but she states that they have been normal in color and have not been bloody or dark. After the nausea, vomiting, and diarrhea began, she began to experience some generalized crampy abdominal discomfort. She denies any fever at home but states that her temperature was elevated up to 100.1°F. She has had a previous cholecystectomy but denies any other abdominal surgeries. She does have a reported history of irritable bowel syndrome but she states that she typically does not have nausea or vomiting with a flare of this disease. She denies any cough, congestion, runny nose, chest pain, or shortness of breath. She does complain of some mild dysuria but no hematuria, urgency, or frequency. She denies any vaginal bleeding or discharge.     Review of Systems:   Pertinent positives and negatives are stated within HPI, all other systems reviewed and are negative.          --------------------------------------------- PAST HISTORY ---------------------------------------------  Past Medical History:  has a past medical history of Anxiety, Arthritis, Asthma, Chronic headaches, Chronic pain, Depression, GERD (gastroesophageal reflux disease), History of cardiovascular stress test, Hypertension, Incontinence in female, Irritable bowel syndrome, [facility] Neuropathy, Pneumonia, and Prolonged emergence from general anesthesia. Past Surgical History:  has a past surgical history that includes Cholecystectomy; bone graft; Dilation and curettage of uterus; Foot surgery; cyst removal; fracture surgery; Colonoscopy; Upper gastrointestinal endoscopy; Endoscopy, colon, diagnostic (02/09/2017); bronchoscopy (N/A, 3/27/2018); and bronchoscopy (3/27/2018). Social History:  reports that she has never smoked. She has never used smokeless tobacco. She reports that she does not drink alcohol or use drugs. Family History: family history includes Cancer in her father; High Blood Pressure in her brother, maternal grandfather, and mother; Other in her mother. The patients home medications have been reviewed. Allergies: Cymbalta [duloxetine hcl]; Dexlansoprazole; Diovan [valsartan]; Ditropan [oxybutynin]; Lunesta [eszopiclone]; Molds & smuts; Norvasc [amlodipine besylate]; Sular [nisoldipine er]; Tape [adhesive tape]; Dextromethorphan polistirex er; Levaquin [levofloxacin]; Other; Questran [cholestyramine]; and Yellow dyes (non-tartrazine)    -------------------------------------------------- RESULTS -------------------------------------------------  All laboratory and radiology results have been personally reviewed by myself   LABS:  No results found for this visit on 03/30/19. RADIOLOGY:  Interpreted by Radiologist.  Ben Moreira IV CONTRAST Additional Contrast? Oral    (Results Pending)     EKG Interpretation    Interpreted by emergency department physician    Rhythm: sinus tachycardia  Rate: 100-110  Axis: normal  Ectopy: none  Conduction: normal  ST Segments: no acute change  T Waves: flattening in  multiple  Q Waves: none    Clinical Impression: Sinus tachycardia, rate of 102, normal axis, normal conduction, no ST segment changes, nonspecific T wave flattening throughout the EKG likely related to body habitus, no Q waves. No evidence of acute ischemia, infarction, or dysrhythmia.     Nia Kirkpatrick Damon      ------------------------- NURSING NOTES AND VITALS REVIEWED ---------------------------   The nursing notes within the ED encounter and vital signs as below have been reviewed. BP (!) 158/90   Pulse 128   Temp 98 °F (36.7 °C) (Oral)   Resp 16   Ht 5' 1\" (1.549 m)   Wt 300 lb (136.1 kg)   SpO2 97%   BMI 56.68 kg/m²   Oxygen Saturation Interpretation: Normal      ---------------------------------------------------PHYSICAL EXAM--------------------------------------      Constitutional/General: Alert and oriented x3, well appearing, non toxic in NAD  Head: Normocephalic and atraumatic  Eyes: PERRL, EOMI  Mouth: Oropharynx clear, handling secretions, no trismus. Dry oral mucous membranes. Neck: Supple, full ROM,   Pulmonary: Lungs clear to auscultation bilaterally, no wheezes, rales, or rhonchi. Not in respiratory distress  Cardiovascular:  Regular rate and rhythm, no murmurs, gallops, or rubs. 2+ distal pulses  Abdomen: Soft, nondistended. Very minimal generalized abdominal tenderness to deep palpation without guarding, rebound, or rigidity. Hyperactive bowel sounds in all 4 quadrants. Extremities: Moves all extremities x 4. Warm and well perfused  Skin: warm and dry without rash  Neurologic: GCS 15,  Psych: Normal Affect      ------------------------------ ED COURSE/MEDICAL DECISION MAKING----------------------  Medications   0.9 % sodium chloride bolus (has no administration in time range)   trimethobenzamide (TIGAN) injection 200 mg (has no administration in time range)         ED COURSE:  ED Course as of Apr 09 2354   Sat Mar 30, 2019   1052 Patient's lactic acid has gone down but only from 3.9 to 3.5 despite 1.5L normal saline. She will be given more IVF and be reassessed. She states she is feeling better, her nausea seems better controlled. [BM]   1141 Patient reassessed.  She is resting comfortably in bed and denies any episodes of vomiting since arrival. She did have one small bowel movement. She states that her nausea feels better. Updated her about the workup thus far and offered her oral fluids. She will drink some water and Gatorade and ensure that she is able to keep it down. She otherwise denies any new or worsening symptoms and overall states that she feels better compared to arrival.    [BM]   1346 Spoke with Dr. Pennie Bowen who will admit the patient. She is agreeable with admission and denies any new or worsening complaints at this time. [BM]      ED Course User Index  [BM] Brittnee Swartz,        Medical Decision Making:    Patient presents with nausea, vomiting, diarrhea, generalized abdominal pain beginning yesterday. She has had a previous cholecystectomy and partial bowel obstruction must be considered given her significant nausea and vomiting. She didn't technically have a fever but she was hyperpyrexic at 100.1°F and intra-abdominal infection must also be considered. She cannot identify any specific foods that are suspicious although foodborne illness is also considered. Patient will have baseline blood work drawn including CMP, lipase, and troponin and she will also a CT scan of the abdomen and pelvis. She'll be given IV fluid hydration and antiemetic. Counseling: The emergency provider has spoken with the patient and discussed todays results, in addition to providing specific details for the plan of care and counseling regarding the diagnosis and prognosis. Questions are answered at this time and they are agreeable with the plan.    0700  Signing out patient to [address]. Patient's case discussed. Treatment plan as well as current test results reviewed together. --------------------------------- IMPRESSION AND DISPOSITION ---------------------------------      ATTENDING PROVIDER ATTESTATION:     Christiano Myrick presented to the emergency department for evaluation of Emesis (ONSET OF SYMPTOMS YESTERDAY NOT GETTING BETTER.   PATIENT STATED THAT SHE HAS HAD 7 BOUTS OF EMESIS); Nausea; and Diarrhea    I have reviewed and discussed the case, including pertinent history (medical, surgical, family and social) and exam findings with the Resident and the Nurse assigned to Pako Wolf. I have personally performed and/or participated in the history, exam, medical decision making, and procedures and agree with all pertinent clinical information. I have reviewed my findings and recommendations with Pako Wolf and members of family present at the time of disposition. MDM: Supportive care, will obtain appropriate labs and imaging to assess patient's Emesis (ONSET OF SYMPTOMS YESTERDAY NOT GETTING BETTER. PATIENT STATED THAT SHE HAS HAD 7 BOUTS OF EMESIS); Nausea; and Diarrhea       My findings/plan: There were no encounter diagnoses.   New Prescriptions    No medications on file     DO Mango Sotomayor DO  04/09/19 9018

## 2019-03-30 NOTE — H&P
[address]  Internal Medicine  -Resident History & Physical-    PCP:  Jabari Thapa DO  Admitting Physician:  No admitting provider for patient encounter. Consultants:  None at this time   Chief Complaint:    Chief Complaint   Patient presents with    Emesis     ONSET OF SYMPTOMS YESTERDAY NOT GETTING BETTER. PATIENT STATED THAT SHE HAS HAD 7 BOUTS OF EMESIS    Nausea    Diarrhea       History of Present Illness  Al Potts is a 47 y.o. female who presents to [facility] ER complaining of nausea, vomiting, diarrhea. Al Potts has a past medical history that includes hypertension, depression. Nery Chappell presents to ER with complaints of nausea, vomiting, diarrhea, and abdominal cramping. She states that this started yesterday. She has had 3 episodes of diarrhea in the emergency department alone. She has been unable to handle oral intake. She denies any suspicious food intake or antibiotic usage. She admits to home temperature of 100.2. Vomiting and diarrhea have been nonbloody. ER Course  Upon presentation to the ER, routine labwork was performed which revealed lactic acid of 3.9, glucose of 126, AST of 32. Imaging results are as outlined below in the Imaging section of this note. EKG revealed sinus tachycardia. Upon arrival to the ER, patient was 158/90 and tachycardic at 128. The patient received 3L NS, tigan in the emergency room and was admitted to med/surg under the care of Dr. Adeola Cruz. Last Hospital Admission - 3/22/18  1. Aspiration pneumonia with concomitant influenza A infection  2. Influenza a infection  3. History of thyroid nodule status post recent biopsy with benign findings on cytology  4. Asthma  5. Depression  6. Obesity secondary to excess calorie intake.      Last Echocardiogram -   None     ED TRIAGE VITALS  BP: (!) 142/104, Temp: 99 °F (37.2 °C), Pulse: 95, Resp: 20, SpO2: 99 %    Vitals:    03/30/19 0551 03/30/19 0820 03/30/19 1042 03/30/19 1255 BP: (!) 158/90 125/88 115/78 (!) 142/104   Pulse: 128 95 90 95   Resp: 16 18 20 20   Temp: 98 °F (36.7 °C) 98.5 °F (36.9 °C) 98.9 °F (37.2 °C) 99 °F (37.2 °C)   TempSrc: Oral Oral Oral Oral   SpO2: 97% 98% 98% 99%   Weight: 300 lb (136.1 kg)      Height: 5' 1\" (1.549 m)            Histories  Past Medical History:   Diagnosis Date    Anxiety     Arthritis     Asthma     Chronic headaches     Chronic pain     Depression     GERD (gastroesophageal reflux disease)     History of cardiovascular stress test 05/2015    dobutamine stress test    Hypertension     Incontinence in female     Irritable bowel syndrome     Neuromuscular disorder (HCC)     Neuropathy     Pneumonia 3/22/2018    Prolonged emergence from general anesthesia     sometimes slow to wake up slow to go to sleep     Past Surgical History:   Procedure Laterality Date    BONE GRAFT      BRONCHOSCOPY N/A 3/27/2018    BRONCHOSCOPY DIAGNOSTIC OR CELL [address] ONLY performed by Ramírez Ramos MD at [address] [place] [address]  3/27/2018    BRONCHOSCOPY BRUSHINGS performed by Ramírez Ramos MD at [place] [address] COLONOSCOPY      CYST REMOVAL      DILATION AND CURETTAGE OF UTERUS      ENDOSCOPY, COLON, DIAGNOSTIC  02/09/2017    FOOT SURGERY      FRACTURE SURGERY      right humerus    UPPER GASTROINTESTINAL ENDOSCOPY       Family History   Problem Relation Age of Onset    Other Mother     High Blood Pressure Mother     Cancer Father     High Blood Pressure Brother     High Blood Pressure Maternal Grandfather        Home Medications  Prior to Admission medications    Medication Sig Start Date End Date Taking?  Authorizing Provider   metoprolol succinate (TOPROL XL) 200 MG extended release tablet Take 1 tablet by mouth daily 10/18/18   Man Christensen DO   tiZANidine (ZANAFLEX) 4 MG tablet Take 1 tablet by mouth 3 times daily 10/18/18   Hallie Christensen DO   rizatriptan (MAXALT) 5 MG tablet Take 1 tablet by mouth once as needed for Migraine May repeat in 2 hours if needed 10/18/18 10/18/18  Duncan Christensen,    buPROPion (WELLBUTRIN XL) 150 MG extended release tablet Take 150 mg by mouth every morning    Historical Provider, MD   OXYGEN Inhale into the lungs nightly    Historical Provider, MD   olopatadine (PATANOL) 0.1 % ophthalmic solution Place 1 drop into the right eye 2 times daily    Historical Provider, MD   mometasone [facility] HFA) 100 MCG/ACT AERO inhaler Inhale 2 puffs into the lungs 2 times daily    Historical Provider, MD   hyoscyamine (LEVSIN/SL) 125 MCG sublingual tablet Place 125 mcg under the tongue every 6 hours as needed for Cramping    Historical Provider, MD   chlordiazePOXIDE-clidinium (LIBRAX) 5-2.5 MG per capsule Take 1 capsule by mouth 3 times daily (with meals) .     Historical Provider, MD   cloNIDine (CATAPRES) 0.2 MG tablet Take 0.2 mg by mouth nightly    Historical Provider, MD   fluconazole (DIFLUCAN) 200 MG tablet Take 200 mg by mouth daily    Historical Provider, MD   senna (SENOKOT) 8.6 MG tablet Take 1 tablet by mouth as needed for Constipation    Historical Provider, MD   albuterol (ACCUNEB) 0.63 MG/3ML nebulizer solution Take 1 ampule by nebulization every 6 hours as needed for Wheezing    Historical Provider, MD   VORTIoxetine HBr (TRINTELLIX) 20 MG TABS tablet Take 10 mg by mouth daily     Historical Provider, MD   fluticasone (FLONASE) 50 MCG/ACT nasal spray 2 sprays by Nasal route 2 times daily    Historical Provider, MD   diphenoxylate-atropine (LOMOTIL) 2.5-0.025 MG per tablet Take 1 tablet by mouth 4 times daily as needed for Diarrhea    Historical Provider, MD   prochlorperazine (COMPAZINE) 10 MG tablet Take 10 mg by mouth every 8 hours as needed    Historical Provider, MD   albuterol sulfate  (90 BASE) MCG/ACT inhaler Inhale 2 puffs into the lungs every 6 hours as needed for Wheezing    Historical Provider, MD   acidophilus (LACTINEX/FLORANEX) Take 1 tablet by mouth as needed     Historical Provider, MD   clotrimazole-betamethasone (LOTRISONE) 1-0.05 % cream Apply topically as needed Apply topically 2 times daily. Historical Provider, MD   dicyclomine (BENTYL) 20 MG tablet Take 20 mg by mouth every 6 hours as needed    Historical Provider, MD   ARIPiprazole (ABILIFY) 10 MG tablet Take 20 mg by mouth daily     Historical Provider, MD   ALPRAZolam [place]) 2 MG tablet Take 2 mg by mouth 3 times daily as needed for Sleep or Anxiety     Historical Provider, MD   oxyCODONE-acetaminophen (PERCOCET)  MG per tablet Take 1 tablet by mouth every 6 hours as needed for Pain  . Historical Provider, MD   montelukast (SINGULAIR) 10 MG tablet Take 10 mg by mouth nightly. Historical Provider, MD   gabapentin (NEURONTIN) 600 MG tablet Take 600 mg by mouth nightly     Historical Provider, MD   losartan-hydrochlorothiazide (HYZAAR) 100-25 MG per tablet Take 1 tablet by mouth nightly 100/12.5   1 tab nightly    Historical Provider, MD   diclofenac sodium 1 % GEL Apply 2 g topically 2 times daily. Historical Provider, MD   pantoprazole (PROTONIX) 40 MG tablet Take 40 mg by mouth 2 times daily     Historical Provider, MD   meloxicam (MOBIC) 15 MG tablet Take 15 mg by mouth nightly     Historical Provider, MD   busPIRone (BUSPAR) 15 MG tablet Take 15 mg by mouth 2 times daily     Historical Provider, MD   Loratadine (CLARITIN) 10 MG CAPS Take 10 mg by mouth daily     Historical Provider, MD       Allergies  Cymbalta [duloxetine hcl]; Dexlansoprazole; Diovan [valsartan]; Ditropan [oxybutynin]; Lunesta [eszopiclone]; Molds & smuts; Norvasc [amlodipine besylate]; Sular [nisoldipine er]; Tape [adhesive tape]; Dextromethorphan polistirex er; Levaquin [levofloxacin];  Other; Questran [cholestyramine]; and Yellow dyes (non-tartrazine)    Social Hx  Social History     Socioeconomic History    Marital status: Single     Spouse name: Not on file    Number of children: Not on file    Years of education: Not on file    Highest education level: Not on file   Occupational History    Not on file   Social Needs    Financial resource strain: Not on file    Food insecurity:     Worry: Not on file     Inability: Not on file    Transportation needs:     Medical: Not on file     Non-medical: Not on file   Tobacco Use    Smoking status: Never Smoker    Smokeless tobacco: Never Used   Substance and Sexual Activity    Alcohol use: No    Drug use: No    Sexual activity: Not on file   Lifestyle    Physical activity:     Days per week: Not on file     Minutes per session: Not on file    Stress: Not on file   Relationships    Social connections:     Talks on phone: Not on file     Gets together: Not on file     Attends [employer] service: Not on file     Active member of club or organization: Not on file     Attends meetings of clubs or organizations: Not on file     Relationship status: Not on file    Intimate partner violence:     Fear of current or ex partner: Not on file     Emotionally abused: Not on file     Physically abused: Not on file     Forced sexual activity: Not on file   Other Topics Concern    Not on file   Social History Narrative    Not on file       Review of Systems  All bolded are positive; please see HPI  General:  Fever, chills, diaphoresis, fatigue, malaise, night sweats, weight loss  Psychological:  Anxiety, disorientation, hallucinations. ENT:  Epistaxis, headaches, vertigo, visual changes. Cardiovascular:  Chest pain, irregular heartbeats, palpitations, paroxysmal nocturnal dyspnea. Respiratory:  Shortness of breath, coughing, sputum production, hemoptysis, wheezing, orthopnea.   Gastrointestinal:  Nausea, vomiting, diarrhea, heartburn, constipation, abdominal pain, hematemesis, hematochezia, melena, acholic stools  Genito-Urinary:  Dysuria, urgency, frequency, hematuria  Musculoskeletal:  Joint pain, joint stiffness, joint swelling, muscle pain  Neurology: Headache, focal neurological deficits, weakness, numbness, paresthesia  Derm:  Rashes, ulcers, excoriations, bruising  Extremities:  Decreased ROM, peripheral edema, mottling    Physical Examination  Vitals:  BP (!) 142/104   Pulse 95   Temp 99 °F (37.2 °C) (Oral)   Resp 20   Ht 5' 1\" (1.549 m)   Wt 300 lb (136.1 kg)   SpO2 99%   BMI 56.68 kg/m²   General Appearance:  awake, alert, and oriented to person, place, time, and purpose; appears stated age and cooperative; no apparent distress no labored breathing  HEENT:  NCAT; PERRL; conjunctiva pink, sclera clear  Neck:  no adenopathy, bruit, JVD, tenderness, masses, or nodules; supple, symmetrical, trachea midline, thyroid not enlarged  Lung:  clear to auscultation bilaterally; no use of accessory muscles; no rhonchi, rales, or crackles  Heart:  regular rate and regular rhythm without murmur, rub, or gallop  Abdomen:  soft, nontender, nondistended; normoactive bowel sounds; no organomegaly  Extremities:  extremities normal, atraumatic, no cyanosis or edema  Musculokeletal:  no joint swelling, no muscle tenderness. ROM normal in all joints of extremities.    Neurologic:  mental status A&Ox3, thought content appropriate; CN II-XII grossly intact; sensation intact, motor strength 5/5 globally; no slurred speech  Osteopathic:  Patient examined in seated position; normal lumbar lordosis and thoracic kyphosis; no TART changes to paraspinal musculature    Laboratory Data  Recent Results (from the past 24 hour(s))   EKG 12 Lead    Collection Time: 03/30/19  6:25 AM   Result Value Ref Range    Ventricular Rate 102 BPM    Atrial Rate 102 BPM    P-R Interval 146 ms    QRS Duration 82 ms    Q-T Interval 318 ms    QTc Calculation (Bazett) 414 ms    P Axis 39 degrees    R Axis 24 degrees    T Axis 122 degrees   Urinalysis, reflex to microscopic    Collection Time: 03/30/19  6:32 AM   Result Value Ref Range    Color, UA Yellow Straw/Yellow    Clarity, UA SLCLOUDY Clear Glucose, Ur Negative Negative mg/dL    Bilirubin Urine SMALL (A) Negative    Ketones, Urine Negative Negative mg/dL    Specific Gravity, UA >=1.030 1.005 - 1.030    Blood, Urine Negative Negative    pH, UA 5.0 5.0 - 9.0    Protein, UA 30 (A) Negative mg/dL    Urobilinogen, Urine 0.2 <2.0 E.U./dL    Nitrite, Urine Negative Negative    Leukocyte Esterase, Urine Negative Negative   Microscopic Urinalysis    Collection Time: 03/30/19  6:32 AM   Result Value Ref Range    WBC, UA 10-20 (A) 0 - 5 /HPF    RBC, UA 2-5 0 - 2 /HPF    Epi Cells RARE /HPF    Bacteria, UA RARE (A) /HPF   CBC Auto Differential    Collection Time: 03/30/19  6:49 AM   Result Value Ref Range    WBC 7.0 4.5 - 11.5 E9/L    RBC 5.21 3.50 - 5.50 E12/L    Hemoglobin 15.2 11.5 - 15.5 g/dL    Hematocrit 47.4 34.0 - 48.0 %    MCV 91.0 80.0 - 99.9 fL    MCH 29.2 26.0 - 35.0 pg    MCHC 32.1 32.0 - 34.5 %    RDW 14.6 11.5 - 15.0 fL    Platelets 882 331 - 036 E9/L    MPV 11.1 7.0 - 12.0 fL    Neutrophils % 82.2 (H) 43.0 - 80.0 %    Immature Granulocytes % 0.6 0.0 - 5.0 %    Lymphocytes % 9.5 (L) 20.0 - 42.0 %    Monocytes % 7.2 2.0 - 12.0 %    Eosinophils % 0.1 0.0 - 6.0 %    Basophils % 0.4 0.0 - 2.0 %    Neutrophils # 5.71 1.80 - 7.30 E9/L    Immature Granulocytes # 0.04 E9/L    Lymphocytes # 0.66 (L) 1.50 - 4.00 E9/L    Monocytes # 0.50 0.10 - 0.95 E9/L    Eosinophils # 0.01 (L) 0.05 - 0.50 E9/L    Basophils # 0.03 0.00 - 0.20 E9/L   Lipase    Collection Time: 03/30/19  6:49 AM   Result Value Ref Range    Lipase 17 13 - 60 U/L   Lactic Acid, Plasma    Collection Time: 03/30/19  6:49 AM   Result Value Ref Range    Lactic Acid 3.9 (H) 0.5 - 2.2 mmol/L   SPECIMEN REJECTION    Collection Time: 03/30/19  7:32 AM   Result Value Ref Range    Rejected Test cmpx, trop     Reason for Rejection see below    Comprehensive Metabolic Panel w/ Reflex to MG    Collection Time: 03/30/19  7:47 AM   Result Value Ref Range    Sodium 133 132 - 146 mmol/L    Potassium reflex Magnesium 4.6 3.5 - 5.0 mmol/L    Chloride 99 98 - 107 mmol/L    CO2 20 (L) 22 - 29 mmol/L    Anion Gap 14 7 - 16 mmol/L    Glucose 126 (H) 74 - 99 mg/dL    BUN 22 (H) 6 - 20 mg/dL    CREATININE 0.7 0.5 - 1.0 mg/dL    GFR Non-African American >60 >=60 mL/min/1.73    GFR African American >60     Calcium 8.3 (L) 8.6 - 10.2 mg/dL    Total Protein 6.5 6.4 - 8.3 g/dL    Alb 3.5 3.5 - 5.2 g/dL    Total Bilirubin 0.4 0.0 - 1.2 mg/dL    Alkaline Phosphatase 75 35 - 104 U/L    ALT 27 0 - 32 U/L    AST 32 (H) 0 - 31 U/L   Troponin    Collection Time: 03/30/19  7:47 AM   Result Value Ref Range    Troponin <0.01 0.00 - 0.03 ng/mL   Lactic Acid, Plasma    Collection Time: 03/30/19 10:15 AM   Result Value Ref Range    Lactic Acid 3.5 (H) 0.5 - 2.2 mmol/L   Lactic Acid, Plasma    Collection Time: 03/30/19 12:30 PM   Result Value Ref Range    Lactic Acid 3.7 (H) 0.5 - 2.2 mmol/L       Imaging  Ct Abdomen Pelvis W Iv Contrast Additional Contrast? Oral    Result Date: 3/30/2019  Reading location:  [facility] HISTORY: Vomiting and diarrhea with abdominal pain and fever. TECHNIQUE: Serial axial images of the abdomen and pelvis were obtained following the uneventful administration of 80 cc of Isovue 370 intravenous contrast and after oral contrast. Reformatted sagittal and coronal images were obtained One or more of the following dose reduction techniques were used: Automated dose exposure. Adjustment of the mA and/or kV according to patient size Use of iterative reconstruction techniques FINDINGS: Images through the lung bases demonstrate minimal discoid atelectasis within the right lung base. There is no pneumonia. There is fatty infiltration of the liver. No hepatic mass is noted. The spleen, pancreas, adrenal glands and kidneys are unremarkable. There is no obstructive uropathy. There is no calculus seen within the course of the ureters. The urinary bladder is normally distended. The gallbladder is surgically absent.  The stomach is normally distended. There is no large or small bowel obstruction. There is no bowel distention and there are no inflammatory changes. There is no pelvic mass, fluid collection or free air. The appendix is normal in caliber. The abdominal aorta is normal in caliber. There is no retroperitoneal lymphadenopathy. No pelvic lymphadenopathy is identified. 1. There is no acute intra-abdominal pathology. Specifically, there is no bowel obstruction or findings of inflammatory bowel disease. 2. Hepatic steatosis. 3. Previous cholecystectomy. 4. There is no evidence of appendicitis. Assessment and Plan  Patient is a 47 y.o. female who presented with nausea, vomiting, diarrhea, abdominal pain. The active problem list is as follows:    · Intractable nausea, vomiting, diarrhea with persistent lactic acidosis likely secondary to viral gastroenteritis  · Hypertension  · Irritable Bowel Syndrome  · Hepatic steatosis  · History of thyroid nodule status post recent biopsy with benign findings on cytology  · Asthma  · Depression and anxiety  · Obesity Class III    -IV fluids  -Trend lactics  -Zofran for nausea    · Routine labs in the morning. · DVT prophylaxis. · Please see orders for further management and care. The plan to be discussed with Dr. Phong Fischer. Baylee Lopez DO, PGYII  1:21 PM  3/30/2019    The chart was reviewed and the patient was seen and examined. The case was discussed in detail with the resident and agree with current impressions and plan.     Asad Manzano D.O.  8:47 PM  3/30/2019

## 2019-03-31 LAB
ALBUMIN SERPL-MCNC: 3.3 G/DL (ref 3.5–5.2)
ALP BLD-CCNC: 68 U/L (ref 35–104)
ALT SERPL-CCNC: 33 U/L (ref 0–32)
ANION GAP SERPL CALCULATED.3IONS-SCNC: 9 MMOL/L (ref 7–16)
AST SERPL-CCNC: 30 U/L (ref 0–31)
BILIRUB SERPL-MCNC: 0.3 MG/DL (ref 0–1.2)
BUN BLDV-MCNC: 11 MG/DL (ref 6–20)
C DIFFICILE TOXIN, EIA: NORMAL
CALCIUM SERPL-MCNC: 8.4 MG/DL (ref 8.6–10.2)
CHLORIDE BLD-SCNC: 108 MMOL/L (ref 98–107)
CHOLESTEROL, TOTAL: 154 MG/DL (ref 0–199)
CO2: 22 MMOL/L (ref 22–29)
CREAT SERPL-MCNC: 0.9 MG/DL (ref 0.5–1)
EKG ATRIAL RATE: 102 BPM
EKG P AXIS: 39 DEGREES
EKG P-R INTERVAL: 146 MS
EKG Q-T INTERVAL: 318 MS
EKG QRS DURATION: 82 MS
EKG QTC CALCULATION (BAZETT): 414 MS
EKG R AXIS: 24 DEGREES
EKG T AXIS: 122 DEGREES
EKG VENTRICULAR RATE: 102 BPM
GFR AFRICAN AMERICAN: >60
GFR NON-AFRICAN AMERICAN: >60 ML/MIN/1.73
GLUCOSE BLD-MCNC: 101 MG/DL (ref 74–99)
HBA1C MFR BLD: 5.7 % (ref 4–5.6)
HCT VFR BLD CALC: 37.3 % (ref 34–48)
HDLC SERPL-MCNC: 39 MG/DL
HEMOGLOBIN: 11.7 G/DL (ref 11.5–15.5)
LACTIC ACID: 1.8 MMOL/L (ref 0.5–2.2)
LDL CHOLESTEROL CALCULATED: 81 MG/DL (ref 0–99)
MAGNESIUM: 1.8 MG/DL (ref 1.6–2.6)
MCH RBC QN AUTO: 29.6 PG (ref 26–35)
MCHC RBC AUTO-ENTMCNC: 31.4 % (ref 32–34.5)
MCV RBC AUTO: 94.4 FL (ref 80–99.9)
PDW BLD-RTO: 14.9 FL (ref 11.5–15)
PHOSPHORUS: 3 MG/DL (ref 2.5–4.5)
PLATELET # BLD: 205 E9/L (ref 130–450)
PMV BLD AUTO: 11.1 FL (ref 7–12)
POTASSIUM SERPL-SCNC: 4.4 MMOL/L (ref 3.5–5)
RBC # BLD: 3.95 E12/L (ref 3.5–5.5)
SODIUM BLD-SCNC: 139 MMOL/L (ref 132–146)
TOTAL PROTEIN: 6 G/DL (ref 6.4–8.3)
TRIGL SERPL-MCNC: 168 MG/DL (ref 0–149)
TSH SERPL DL<=0.05 MIU/L-ACNC: 2.09 UIU/ML (ref 0.27–4.2)
VLDLC SERPL CALC-MCNC: 34 MG/DL
WBC # BLD: 4.6 E9/L (ref 4.5–11.5)

## 2019-03-31 PROCEDURE — 84443 ASSAY THYROID STIM HORMONE: CPT

## 2019-03-31 PROCEDURE — 80053 COMPREHEN METABOLIC PANEL: CPT

## 2019-03-31 PROCEDURE — 36415 COLL VENOUS BLD VENIPUNCTURE: CPT

## 2019-03-31 PROCEDURE — 6370000000 HC RX 637 (ALT 250 FOR IP): Performed by: INTERNAL MEDICINE

## 2019-03-31 PROCEDURE — G0378 HOSPITAL OBSERVATION PER HR: HCPCS

## 2019-03-31 PROCEDURE — 6360000002 HC RX W HCPCS: Performed by: INTERNAL MEDICINE

## 2019-03-31 PROCEDURE — 83735 ASSAY OF MAGNESIUM: CPT

## 2019-03-31 PROCEDURE — 83605 ASSAY OF LACTIC ACID: CPT

## 2019-03-31 PROCEDURE — 94640 AIRWAY INHALATION TREATMENT: CPT

## 2019-03-31 PROCEDURE — 96372 THER/PROPH/DIAG INJ SC/IM: CPT

## 2019-03-31 PROCEDURE — 83036 HEMOGLOBIN GLYCOSYLATED A1C: CPT

## 2019-03-31 PROCEDURE — 2580000003 HC RX 258: Performed by: INTERNAL MEDICINE

## 2019-03-31 PROCEDURE — 80061 LIPID PANEL: CPT

## 2019-03-31 PROCEDURE — 85027 COMPLETE CBC AUTOMATED: CPT

## 2019-03-31 PROCEDURE — 84100 ASSAY OF PHOSPHORUS: CPT

## 2019-03-31 RX ORDER — PROMETHAZINE HYDROCHLORIDE 25 MG/ML
25 INJECTION, SOLUTION INTRAMUSCULAR; INTRAVENOUS EVERY 6 HOURS PRN
Status: DISCONTINUED | OUTPATIENT
Start: 2019-03-31 | End: 2019-04-01 | Stop reason: HOSPADM

## 2019-03-31 RX ADMIN — GABAPENTIN 600 MG: 300 CAPSULE ORAL at 20:19

## 2019-03-31 RX ADMIN — VORTIOXETINE 20 MG: 10 TABLET, FILM COATED ORAL at 10:23

## 2019-03-31 RX ADMIN — BUSPIRONE HYDROCHLORIDE 15 MG: 7.5 TABLET ORAL at 20:19

## 2019-03-31 RX ADMIN — FLUTICASONE PROPIONATE 2 PUFF: 110 AEROSOL, METERED RESPIRATORY (INHALATION) at 07:17

## 2019-03-31 RX ADMIN — SODIUM CHLORIDE: 9 INJECTION, SOLUTION INTRAVENOUS at 23:31

## 2019-03-31 RX ADMIN — ARIPIPRAZOLE 30 MG: 15 TABLET ORAL at 10:21

## 2019-03-31 RX ADMIN — OXYCODONE AND ACETAMINOPHEN 1 TABLET: 10; 325 TABLET ORAL at 08:38

## 2019-03-31 RX ADMIN — PANTOPRAZOLE SODIUM 40 MG: 40 TABLET, DELAYED RELEASE ORAL at 08:37

## 2019-03-31 RX ADMIN — OLOPATADINE HYDROCHLORIDE 1 DROP: 1 SOLUTION/ DROPS OPHTHALMIC at 20:44

## 2019-03-31 RX ADMIN — FLUTICASONE PROPIONATE 2 PUFF: 110 AEROSOL, METERED RESPIRATORY (INHALATION) at 16:42

## 2019-03-31 RX ADMIN — ALPRAZOLAM 2 MG: 1 TABLET ORAL at 08:37

## 2019-03-31 RX ADMIN — PANTOPRAZOLE SODIUM 40 MG: 40 TABLET, DELAYED RELEASE ORAL at 20:19

## 2019-03-31 RX ADMIN — OXYCODONE AND ACETAMINOPHEN 1 TABLET: 10; 325 TABLET ORAL at 20:19

## 2019-03-31 RX ADMIN — ALPRAZOLAM 2 MG: 1 TABLET ORAL at 20:20

## 2019-03-31 RX ADMIN — MELOXICAM 15 MG: 7.5 TABLET ORAL at 20:20

## 2019-03-31 RX ADMIN — BUSPIRONE HYDROCHLORIDE 15 MG: 7.5 TABLET ORAL at 10:22

## 2019-03-31 RX ADMIN — METOPROLOL TARTRATE 50 MG: 50 TABLET ORAL at 08:37

## 2019-03-31 RX ADMIN — MONTELUKAST 10 MG: 10 TABLET, FILM COATED ORAL at 20:19

## 2019-03-31 RX ADMIN — OLOPATADINE HYDROCHLORIDE 1 DROP: 1 SOLUTION/ DROPS OPHTHALMIC at 08:38

## 2019-03-31 RX ADMIN — METOPROLOL TARTRATE 50 MG: 50 TABLET ORAL at 20:19

## 2019-03-31 RX ADMIN — ENOXAPARIN SODIUM 40 MG: 100 INJECTION SUBCUTANEOUS at 08:38

## 2019-03-31 RX ADMIN — PROMETHAZINE HYDROCHLORIDE 25 MG: 25 INJECTION INTRAMUSCULAR; INTRAVENOUS at 11:21

## 2019-03-31 RX ADMIN — SODIUM CHLORIDE: 9 INJECTION, SOLUTION INTRAVENOUS at 13:39

## 2019-03-31 ASSESSMENT — PAIN DESCRIPTION - PROGRESSION
CLINICAL_PROGRESSION: NOT CHANGED

## 2019-03-31 ASSESSMENT — PAIN DESCRIPTION - ORIENTATION: ORIENTATION: OTHER (COMMENT)

## 2019-03-31 ASSESSMENT — PAIN DESCRIPTION - LOCATION
LOCATION: ABDOMEN;GENERALIZED
LOCATION: GENERALIZED

## 2019-03-31 ASSESSMENT — PAIN - FUNCTIONAL ASSESSMENT
PAIN_FUNCTIONAL_ASSESSMENT: ACTIVITIES ARE NOT PREVENTED

## 2019-03-31 ASSESSMENT — PAIN DESCRIPTION - FREQUENCY
FREQUENCY: INTERMITTENT
FREQUENCY: INTERMITTENT

## 2019-03-31 ASSESSMENT — PAIN SCALES - GENERAL
PAINLEVEL_OUTOF10: 7
PAINLEVEL_OUTOF10: 0
PAINLEVEL_OUTOF10: 7
PAINLEVEL_OUTOF10: 2
PAINLEVEL_OUTOF10: 0
PAINLEVEL_OUTOF10: 6

## 2019-03-31 ASSESSMENT — PAIN DESCRIPTION - DESCRIPTORS: DESCRIPTORS: ACHING;DISCOMFORT;SORE

## 2019-03-31 ASSESSMENT — PAIN DESCRIPTION - PAIN TYPE
TYPE: ACUTE PAIN;CHRONIC PAIN
TYPE: CHRONIC PAIN

## 2019-03-31 ASSESSMENT — PAIN DESCRIPTION - ONSET
ONSET: ON-GOING
ONSET: GRADUAL

## 2019-03-31 NOTE — PROGRESS NOTES
Patient seen and examined. Patient feels better today with decreased nausea, vomiting, diarrhea. No complaints of unusual SOB,  chest pain,abd. pain, dizziness or constipation. /60   Pulse 75   Temp 97.5 °F (36.4 °C) (Oral)   Resp 16   Ht 5' 1\" (1.549 m)   Wt 242 lb 9.6 oz (110 kg)   SpO2 93%   BMI 45.84 kg/m²     HEENT: negative to gross visual examination  Heart: regular rate and rhythm  Lungs: clear  Abdomen: soft, positive bowel sounds, no hepatosplenomegaly, mild guarding, no rebound, rigidity  Ext: no clubbing, cyanosis, erythema, edema  Neuro: alert and oriented.  No gross deficits    CBC with Differential:    Lab Results   Component Value Date    WBC 4.6 03/31/2019    RBC 3.95 03/31/2019    HGB 11.7 03/31/2019    HCT 37.3 03/31/2019     03/31/2019    MCV 94.4 03/31/2019    MCH 29.6 03/31/2019    MCHC 31.4 03/31/2019    RDW 14.9 03/31/2019    LYMPHOPCT 9.5 03/30/2019    MONOPCT 7.2 03/30/2019    BASOPCT 0.4 03/30/2019    MONOSABS 0.50 03/30/2019    LYMPHSABS 0.66 03/30/2019    EOSABS 0.01 03/30/2019    BASOSABS 0.03 03/30/2019     CMP:    Lab Results   Component Value Date     03/31/2019    K 4.4 03/31/2019    K 4.6 03/30/2019     03/31/2019    CO2 22 03/31/2019    BUN 11 03/31/2019    CREATININE 0.9 03/31/2019    GFRAA >60 03/31/2019    LABGLOM >60 03/31/2019    GLUCOSE 101 03/31/2019    PROT 6.0 03/31/2019    LABALBU 3.3 03/31/2019    CALCIUM 8.4 03/31/2019    BILITOT 0.3 03/31/2019    ALKPHOS 68 03/31/2019    AST 30 03/31/2019    ALT 33 03/31/2019     Magnesium:    Lab Results   Component Value Date    MG 1.8 03/31/2019     Phosphorus:    Lab Results   Component Value Date    PHOS 3.0 03/31/2019     PT/INR:  No results found for: PROTIME, INR  Troponin:    Lab Results   Component Value Date    TROPONINI <0.01 03/30/2019     U/A:    Lab Results   Component Value Date    COLORU Yellow 03/30/2019    PROTEINU 30 03/30/2019    PHUR 5.0 03/30/2019    WBCUA 10-20 03/30/2019 AM  3/31/2019

## 2019-03-31 NOTE — PLAN OF CARE
Problem: Pain:  Goal: Pain level will decrease  Description  Pain level will decrease  Outcome: Met This Shift     Problem: Pain:  Goal: Control of acute pain  Description  Control of acute pain  Outcome: Met This Shift     Problem: Pain:  Goal: Control of chronic pain  Description  Control of chronic pain  Outcome: Met This Shift     Problem: Falls - Risk of:  Goal: Will remain free from falls  Description  Will remain free from falls  Outcome: Met This Shift     Problem: Falls - Risk of:  Goal: Absence of physical injury  Description  Absence of physical injury  Outcome: Met This Shift     Problem: Diarrhea  Goal: Bowel elimination is within specified parameters  Description  Bowel elimination is within specified parameters     Outcome: Met This Shift     Problem: Nausea/Vomiting  Goal: Absence of nausea/vomiting  Description  Absence of nausea/vomiting     Outcome: Met This Shift     Problem: Skin Integrity - Impaired  Goal: Skin will be intact without erythema or breakdown  Outcome: Not Met This Shift

## 2019-04-01 ENCOUNTER — TELEPHONE (OUTPATIENT)
Dept: ADMINISTRATIVE | Age: 54
End: 2019-04-01

## 2019-04-01 VITALS
TEMPERATURE: 97.6 F | OXYGEN SATURATION: 94 % | HEART RATE: 60 BPM | RESPIRATION RATE: 18 BRPM | BODY MASS INDEX: 44.71 KG/M2 | SYSTOLIC BLOOD PRESSURE: 130 MMHG | WEIGHT: 236.8 LBS | DIASTOLIC BLOOD PRESSURE: 80 MMHG | HEIGHT: 61 IN

## 2019-04-01 LAB
ALBUMIN SERPL-MCNC: 3.1 G/DL (ref 3.5–5.2)
ALP BLD-CCNC: 61 U/L (ref 35–104)
ALT SERPL-CCNC: 34 U/L (ref 0–32)
ANION GAP SERPL CALCULATED.3IONS-SCNC: 8 MMOL/L (ref 7–16)
AST SERPL-CCNC: 25 U/L (ref 0–31)
BILIRUB SERPL-MCNC: <0.2 MG/DL (ref 0–1.2)
BUN BLDV-MCNC: 10 MG/DL (ref 6–20)
CALCIUM SERPL-MCNC: 8.4 MG/DL (ref 8.6–10.2)
CHLORIDE BLD-SCNC: 109 MMOL/L (ref 98–107)
CO2: 25 MMOL/L (ref 22–29)
CREAT SERPL-MCNC: 0.9 MG/DL (ref 0.5–1)
CULTURE, STOOL: NORMAL
GFR AFRICAN AMERICAN: >60
GFR NON-AFRICAN AMERICAN: >60 ML/MIN/1.73
GLUCOSE BLD-MCNC: 94 MG/DL (ref 74–99)
HCT VFR BLD CALC: 33.7 % (ref 34–48)
HEMOGLOBIN: 10.4 G/DL (ref 11.5–15.5)
MCH RBC QN AUTO: 29.5 PG (ref 26–35)
MCHC RBC AUTO-ENTMCNC: 30.9 % (ref 32–34.5)
MCV RBC AUTO: 95.5 FL (ref 80–99.9)
PDW BLD-RTO: 15.2 FL (ref 11.5–15)
PLATELET # BLD: 172 E9/L (ref 130–450)
PMV BLD AUTO: 11 FL (ref 7–12)
POTASSIUM SERPL-SCNC: 4 MMOL/L (ref 3.5–5)
RBC # BLD: 3.53 E12/L (ref 3.5–5.5)
SODIUM BLD-SCNC: 142 MMOL/L (ref 132–146)
TOTAL PROTEIN: 5.4 G/DL (ref 6.4–8.3)
WBC # BLD: 4.7 E9/L (ref 4.5–11.5)

## 2019-04-01 PROCEDURE — G0378 HOSPITAL OBSERVATION PER HR: HCPCS

## 2019-04-01 PROCEDURE — 2580000003 HC RX 258: Performed by: INTERNAL MEDICINE

## 2019-04-01 PROCEDURE — 94640 AIRWAY INHALATION TREATMENT: CPT

## 2019-04-01 PROCEDURE — 6360000002 HC RX W HCPCS: Performed by: INTERNAL MEDICINE

## 2019-04-01 PROCEDURE — 96372 THER/PROPH/DIAG INJ SC/IM: CPT

## 2019-04-01 PROCEDURE — 6370000000 HC RX 637 (ALT 250 FOR IP): Performed by: INTERNAL MEDICINE

## 2019-04-01 PROCEDURE — 87425 ROTAVIRUS AG IA: CPT

## 2019-04-01 PROCEDURE — 85027 COMPLETE CBC AUTOMATED: CPT

## 2019-04-01 PROCEDURE — 80053 COMPREHEN METABOLIC PANEL: CPT

## 2019-04-01 PROCEDURE — 36415 COLL VENOUS BLD VENIPUNCTURE: CPT

## 2019-04-01 RX ADMIN — SODIUM CHLORIDE: 9 INJECTION, SOLUTION INTRAVENOUS at 10:06

## 2019-04-01 RX ADMIN — BUSPIRONE HYDROCHLORIDE 15 MG: 7.5 TABLET ORAL at 10:11

## 2019-04-01 RX ADMIN — OLOPATADINE HYDROCHLORIDE 1 DROP: 1 SOLUTION/ DROPS OPHTHALMIC at 09:18

## 2019-04-01 RX ADMIN — ENOXAPARIN SODIUM 40 MG: 100 INJECTION SUBCUTANEOUS at 09:18

## 2019-04-01 RX ADMIN — ALPRAZOLAM 2 MG: 1 TABLET ORAL at 09:18

## 2019-04-01 RX ADMIN — VORTIOXETINE 20 MG: 10 TABLET, FILM COATED ORAL at 10:11

## 2019-04-01 RX ADMIN — OXYCODONE AND ACETAMINOPHEN 1 TABLET: 10; 325 TABLET ORAL at 09:18

## 2019-04-01 RX ADMIN — PANTOPRAZOLE SODIUM 40 MG: 40 TABLET, DELAYED RELEASE ORAL at 09:19

## 2019-04-01 RX ADMIN — ARIPIPRAZOLE 30 MG: 15 TABLET ORAL at 10:11

## 2019-04-01 RX ADMIN — FLUTICASONE PROPIONATE 2 PUFF: 110 AEROSOL, METERED RESPIRATORY (INHALATION) at 06:22

## 2019-04-01 RX ADMIN — METOPROLOL TARTRATE 50 MG: 50 TABLET ORAL at 09:21

## 2019-04-01 ASSESSMENT — PAIN DESCRIPTION - ONSET
ONSET: ON-GOING
ONSET: ON-GOING

## 2019-04-01 ASSESSMENT — PAIN SCALES - GENERAL
PAINLEVEL_OUTOF10: 2
PAINLEVEL_OUTOF10: 7
PAINLEVEL_OUTOF10: 6

## 2019-04-01 ASSESSMENT — PAIN DESCRIPTION - PROGRESSION
CLINICAL_PROGRESSION: NOT CHANGED
CLINICAL_PROGRESSION: NOT CHANGED

## 2019-04-01 ASSESSMENT — PAIN DESCRIPTION - ORIENTATION
ORIENTATION: OTHER (COMMENT)
ORIENTATION: OTHER (COMMENT)

## 2019-04-01 ASSESSMENT — PAIN DESCRIPTION - DESCRIPTORS
DESCRIPTORS: ACHING;DISCOMFORT;SORE
DESCRIPTORS: ACHING;DISCOMFORT;DULL

## 2019-04-01 ASSESSMENT — PAIN DESCRIPTION - PAIN TYPE: TYPE: CHRONIC PAIN

## 2019-04-01 ASSESSMENT — PAIN DESCRIPTION - FREQUENCY
FREQUENCY: INTERMITTENT
FREQUENCY: INTERMITTENT

## 2019-04-01 ASSESSMENT — PAIN - FUNCTIONAL ASSESSMENT
PAIN_FUNCTIONAL_ASSESSMENT: ACTIVITIES ARE NOT PREVENTED
PAIN_FUNCTIONAL_ASSESSMENT: ACTIVITIES ARE NOT PREVENTED

## 2019-04-01 ASSESSMENT — PAIN DESCRIPTION - LOCATION
LOCATION: GENERALIZED
LOCATION: GENERALIZED

## 2019-04-01 NOTE — PLAN OF CARE
Problem: Pain:  Goal: Pain level will decrease  Description  Pain level will decrease  3/31/2019 2341 by Kinsey Peña RN  Outcome: Met This Shift     Problem: Pain:  Goal: Control of acute pain  Description  Control of acute pain  3/31/2019 2341 by Kinsey Peña RN  Outcome: Met This Shift     Problem: Pain:  Goal: Control of chronic pain  Description  Control of chronic pain  3/31/2019 2341 by Kinsey Peña RN  Outcome: Met This Shift     Problem: Falls - Risk of:  Goal: Will remain free from falls  Description  Will remain free from falls  3/31/2019 2341 by Kinsey Peña RN  Outcome: Met This Shift     Problem: Falls - Risk of:  Goal: Absence of physical injury  Description  Absence of physical injury  3/31/2019 2341 by Kinsey Peña RN  Outcome: Met This Shift     Problem: Nausea/Vomiting  Goal: Absence of nausea/vomiting  Description  Absence of nausea/vomiting     3/31/2019 2341 by Kinsey Peña RN  Outcome: Met This Shift     Problem: Diarrhea  Goal: Bowel elimination is within specified parameters  Description  Bowel elimination is within specified parameters     3/31/2019 2341 by Kinsey Peña RN  Outcome: Not Met This Shift     Problem: Skin Integrity - Impaired  Goal: Skin will be intact without erythema or breakdown  3/31/2019 2341 by Kinsey Peña RN  Outcome: Not Met This Shift

## 2019-04-01 NOTE — PROGRESS NOTES
P Quality Flow/Interdisciplinary Rounds Progress Note        Quality Flow Rounds held on April 1, 2019    Disciplines Attending:  Bedside Nurse, ,  and Nursing Unit Leadership    Elena Hernández was admitted on 3/30/2019  5:56 AM    Anticipated Discharge Date:  Expected Discharge Date: 04/02/19    Disposition:    Abhay Score:  Abhay Scale Score: 20    Readmission Risk              Risk of Unplanned Readmission:        19           Discussed patient goal for the day, patient clinical progression, and barriers to discharge.   The following Goal(s) of the Day/Commitment(s) have been identified:  Prompting for DC      [facility]  April 1, 2019

## 2019-04-01 NOTE — TELEPHONE ENCOUNTER
Patient called to cancel her 4/2 appointment as she is admitted to the hospital. She will reschedule once she is discharged.

## 2019-04-01 NOTE — DISCHARGE SUMMARY
Name:  Marvin Lindo  :  1965  MRN:  [id number]  Room:  0337/0337-02  DOS:  2019    [address] [facility]  Internal Medicine  Discharge Summary    PCP:  Daly Paulino DO  Admitting Physician:  Johanny Santos DO  Consultant(s):  IP CONSULT TO INTERNAL MEDICINE      Admission Date:  3/30/2019   Discharge Date:  2019      Admission Diagnoses  Nausea and vomiting [R11.2]     Discharge Diagnoses    Active Problems:    Nausea and vomiting    Diarrhea    Probable viral gastroenteritis    Hypertension    Depression    Asthma    Anxiety    Normocytic anemia-new onset      Brief History of Present Illness and Hospital Course  Marvin Lindo is a 47 y.o. female patient of Daly Paulino DO who  has a past medical history of Anxiety, Arthritis, Asthma, Chronic headaches, Chronic pain, Depression, GERD (gastroesophageal reflux disease), History of cardiovascular stress test, Hypertension, Incontinence in female, Irritable bowel syndrome, Neuromuscular disorder ([address] Neuropathy, and Prolonged emergence from general anesthesia. who originally had concerns including Emesis (ONSET OF SYMPTOMS YESTERDAY NOT GETTING BETTER. PATIENT STATED THAT SHE HAS HAD 7 BOUTS OF EMESIS); Nausea; and Diarrhea. at presentation on 3/30/2019, and was found to have Nausea and vomiting [R11.2] after workup. The patient was brought into the emergency room with history of acute onset nausea vomiting and diarrhea. Patient was also having some mid abdominal cramping. The symptoms started 3/29. The patient was had 3 episodes of diarrhea while in the emergency room. Patient denied any suspicious food contamination or recent antibiotic use. She had a temperature 100.2 at home. They emesis and bowel movements contained no blood. The lactic acid was 3.9 in the emergency room and received 2 L of fluid and it only went down to 3.7. Patient was admitted for further evaluation.     CT the abdomen and pelvis with contrast was unremarkable. The patient's temperature heart rate blood pressure after admission was essentially normal. Patient was very tachycardic in the ER. The lactic acid returned to normal and 24 hours with liver enzymes and kidney function were normal. TSH was 2.1 WBC was normal with the patient's hemoglobin going down to 10.4 prior to discharge with the patient having a hemoglobin of 13.4 one year ago. The urinalysis did have a specific gravity of 1.030. The patient was started on liquid diet and then increase to a low fiber. The patient was tolerating a low fiber diet without any significant problems and just occasional nausea without emesis minimal abdominal pain and the bowel movements were forming up. The patient had one bowel movement last night/afternoon and only 1 bowel movement today. Patient was feeling a lot better and was okay with being discharged today but will come back to emergency room if for some reason something else worsens or shows. Patient to follow up Dr. Giancarlo Soares in the office. The patient may have had just a viral gastroenteritis.     Brief Physical Examination and Laboratory Data on Day of Discharge   Vitals:  /80   Pulse 60   Temp 97.6 °F (36.4 °C) (Oral)   Resp 18   Ht 5' 1\" (1.549 m)   Wt 236 lb 12.8 oz (107.4 kg)   SpO2 94%   BMI 44.74 kg/m²   General Appearance:  awake, alert, and oriented to person, place, time, and purpose; appears stated age and cooperative; no apparent distress no labored breathing  HEENT:  NCAT; PERRL; conjunctiva pink, sclera clear  Neck:  no adenopathy, bruit, JVD, tenderness, masses, or nodules; supple, symmetrical, trachea midline, thyroid not enlarged  Lung:  clear to auscultation bilaterally; no use of accessory muscles; no rhonchi, rales, or crackles  Heart:  regular rate and regular rhythm without murmur, rub, or gallop  Abdomen:  soft, mildly tender mid upper abdomen, nondistended; + morbidly obese, normoactive bowel sounds; no aware of the specific conditions for emergent return, as well as the importance of follow-up.   Their questions are answered at this time and they are agreeable with the plan for discharge to home    Discharge Medications     Medication List      CONTINUE taking these medications    acidophilus     * albuterol sulfate  (90 Base) MCG/ACT inhaler     * albuterol 0.63 MG/3ML nebulizer solution  Commonly known as:  ACCUNEB     ARIPiprazole 30 MG tablet  Commonly known as:  ABILIFY     ASMANEX  MCG/ACT Aero inhaler  Generic drug:  mometasone     busPIRone 15 MG tablet  Commonly known as:  BUSPAR     cloNIDine 0.2 MG tablet  Commonly known as:  CATAPRES     clotrimazole-betamethasone 1-0.05 % cream  Commonly known as:  LOTRISONE     diclofenac sodium 1 % Gel     dicyclomine 20 MG tablet  Commonly known as:  BENTYL     diphenoxylate-atropine 2.5-0.025 MG per tablet  Commonly known as:  LOMOTIL     fluconazole 200 MG tablet  Commonly known as:  DIFLUCAN     fluticasone 50 MCG/ACT nasal spray  Commonly known as:  FLONASE     gabapentin 600 MG tablet  Commonly known as:  NEURONTIN     hyoscyamine 125 MCG sublingual tablet  Commonly known as:  LEVSIN/SL     LIBRAX 5-2.5 MG per capsule  Generic drug:  chlordiazePOXIDE-clidinium     losartan-hydrochlorothiazide 100-25 MG per tablet  Commonly known as:  HYZAAR     metoprolol tartrate 50 MG tablet  Commonly known as:  LOPRESSOR     MOBIC 15 MG tablet  Generic drug:  meloxicam     montelukast 10 MG tablet  Commonly known as:  SINGULAIR     olopatadine 0.1 % ophthalmic solution  Commonly known as:  PATANOL     OXYGEN     pantoprazole 40 MG tablet  Commonly known as:  PROTONIX     prochlorperazine 10 MG tablet  Commonly known as:  COMPAZINE     rizatriptan 5 MG tablet  Commonly known as:  MAXALT  Take 1 tablet by mouth once as needed for Migraine May repeat in 2 hours if needed     senna 8.6 MG tablet  Commonly known as:  SENOKOT     tiZANidine 4 MG tablet  Commonly known as:  ZANAFLEX  Take 1 tablet by mouth 3 times daily     TRINTELLIX 20 MG Tabs tablet  Generic drug:  VORTIoxetine HBr     XANAX 2 MG tablet  Generic drug:  ALPRAZolam         * This list has 2 medication(s) that are the same as other medications prescribed for you. Read the directions carefully, and ask your doctor or other care provider to review them with you. Follow-up / Instructions  · This patient is instructed to follow-up with her primary care physician within 7 days. · Patient is instructed to follow-up with the consults listed above as directed by them. · she is instructed to resume home medications and take new medications as indicated in the list above. · If the patient has a recurrence of symptoms, she is instructed to go to the ED. Preparing for this patient's discharge, including paperwork, orders, instructions, and meeting with patient required ~ 30 minutes.     Mayco Key DO  11:29 AM  4/1/2019

## 2019-04-01 NOTE — PLAN OF CARE
Problem: Pain:  Goal: Pain level will decrease  Description  Pain level will decrease  4/1/2019 1126 by Osmin Hughes RN  Outcome: Met This Shift     Problem: Pain:  Goal: Control of acute pain  Description  Control of acute pain  4/1/2019 1126 by Osmin Hughes RN  Outcome: Met This Shift     Problem: Pain:  Goal: Control of chronic pain  Description  Control of chronic pain  4/1/2019 1126 by Osmin Hughes RN  Outcome: Met This Shift     Problem: Falls - Risk of:  Goal: Will remain free from falls  Description  Will remain free from falls  4/1/2019 1126 by Osmin Hughes RN  Outcome: Met This Shift     Problem: Falls - Risk of:  Goal: Absence of physical injury  Description  Absence of physical injury  4/1/2019 1126 by Osmin Hughes RN  Outcome: Met This Shift     Problem: Diarrhea  Goal: Bowel elimination is within specified parameters  Description  Bowel elimination is within specified parameters     4/1/2019 1126 by Osmin Hughes RN  Outcome: Met This Shift     Problem: Nausea/Vomiting  Goal: Absence of nausea/vomiting  Description  Absence of nausea/vomiting     4/1/2019 1126 by Osmin Hughes RN  Outcome: Met This Shift     Problem: ABCDS Injury Assessment  Goal: Tissue perfusion - peripheral  Description  Extent to which blood flows through the small vessels of the extremities  and maintains tissue function.      Outcome: Met This Shift     Problem: Skin Integrity - Impaired  Goal: Skin will be intact without erythema or breakdown  4/1/2019 1126 by Osmin Hughes RN  Outcome: Not Met This Shift skin  intact, red, dry and intact left abdomen fold skin care provided

## 2019-04-02 LAB
ORGANISM: ABNORMAL
ORGANISM: ABNORMAL
ROTAVIRUS ANTIGEN: NORMAL
URINE CULTURE, ROUTINE: ABNORMAL

## 2019-05-09 ENCOUNTER — HOSPITAL ENCOUNTER (OUTPATIENT)
Dept: ULTRASOUND IMAGING | Age: 54
Discharge: HOME | End: 2019-05-09
Payer: COMMERCIAL

## 2019-05-09 DIAGNOSIS — R22.42 LUMP OF LEFT THIGH: ICD-10-CM

## 2019-05-09 PROCEDURE — 76882 US LMTD JT/FCL EVL NVASC XTR: CPT

## 2020-01-02 ENCOUNTER — OFFICE VISIT (OUTPATIENT)
Dept: ORTHOPEDIC SURGERY | Age: 55
End: 2020-01-02
Payer: COMMERCIAL

## 2020-01-02 VITALS — HEIGHT: 61 IN | WEIGHT: 228 LBS | BODY MASS INDEX: 43.05 KG/M2

## 2020-01-02 PROCEDURE — G8484 FLU IMMUNIZE NO ADMIN: HCPCS | Performed by: ORTHOPAEDIC SURGERY

## 2020-01-02 PROCEDURE — 20610 DRAIN/INJ JOINT/BURSA W/O US: CPT | Performed by: ORTHOPAEDIC SURGERY

## 2020-01-02 PROCEDURE — 3017F COLORECTAL CA SCREEN DOC REV: CPT | Performed by: ORTHOPAEDIC SURGERY

## 2020-01-02 PROCEDURE — 99204 OFFICE O/P NEW MOD 45 MIN: CPT | Performed by: ORTHOPAEDIC SURGERY

## 2020-01-02 PROCEDURE — G8427 DOCREV CUR MEDS BY ELIG CLIN: HCPCS | Performed by: ORTHOPAEDIC SURGERY

## 2020-01-02 PROCEDURE — G8419 CALC BMI OUT NRM PARAM NOF/U: HCPCS | Performed by: ORTHOPAEDIC SURGERY

## 2020-01-02 PROCEDURE — 1036F TOBACCO NON-USER: CPT | Performed by: ORTHOPAEDIC SURGERY

## 2020-01-02 RX ORDER — LAMOTRIGINE 25 MG/1
25 TABLET ORAL 2 TIMES DAILY
COMMUNITY
End: 2020-10-16

## 2020-01-02 RX ORDER — OXYCODONE AND ACETAMINOPHEN 10; 325 MG/1; MG/1
TABLET ORAL
COMMUNITY
End: 2020-09-23

## 2020-01-02 RX ORDER — LORATADINE 10 MG/1
10 TABLET ORAL DAILY
COMMUNITY

## 2020-01-02 RX ORDER — TRIAMCINOLONE ACETONIDE 40 MG/ML
40 INJECTION, SUSPENSION INTRA-ARTICULAR; INTRAMUSCULAR ONCE
Status: COMPLETED | OUTPATIENT
Start: 2020-01-02 | End: 2020-01-02

## 2020-01-02 RX ORDER — BUSPIRONE HYDROCHLORIDE 15 MG/1
TABLET ORAL
COMMUNITY
End: 2020-01-02

## 2020-01-02 RX ORDER — NALOXONE HYDROCHLORIDE 4 MG/.1ML
SPRAY NASAL
COMMUNITY
End: 2020-09-23

## 2020-01-02 RX ADMIN — TRIAMCINOLONE ACETONIDE 40 MG: 40 INJECTION, SUSPENSION INTRA-ARTICULAR; INTRAMUSCULAR at 09:49

## 2020-01-02 NOTE — PROGRESS NOTES
10 MG tablet, loratadine 10 mg tablet, Disp: , Rfl:     oxyCODONE-acetaminophen (PERCOCET)  MG per tablet, oxycodone-acetaminophen 10 mg-325 mg tablet, Disp: , Rfl:     lamoTRIgine (LAMICTAL) 25 MG tablet, lamotrigine 25 mg tablet, Disp: , Rfl:     metoprolol tartrate (LOPRESSOR) 50 MG tablet, Take 50 mg by mouth 2 times daily, Disp: , Rfl:     tiZANidine (ZANAFLEX) 4 MG tablet, Take 1 tablet by mouth 3 times daily, Disp: 90 tablet, Rfl: 1    olopatadine (PATANOL) 0.1 % ophthalmic solution, Place 1 drop into the right eye 2 times daily, Disp: , Rfl:     mometasone (ASMANEX HFA) 100 MCG/ACT AERO inhaler, Inhale 2 puffs into the lungs 2 times daily, Disp: , Rfl:     cloNIDine (CATAPRES) 0.2 MG tablet, Take 0.2 mg by mouth nightly, Disp: , Rfl:     fluconazole (DIFLUCAN) 200 MG tablet, Take 200 mg by mouth daily, Disp: , Rfl:     senna (SENOKOT) 8.6 MG tablet, Take 1 tablet by mouth as needed for Constipation, Disp: , Rfl:     albuterol (ACCUNEB) 0.63 MG/3ML nebulizer solution, Take 1 ampule by nebulization every 6 hours as needed for Wheezing, Disp: , Rfl:     VORTIoxetine HBr (TRINTELLIX) 20 MG TABS tablet, Take 10 mg by mouth daily , Disp: , Rfl:     fluticasone (FLONASE) 50 MCG/ACT nasal spray, 2 sprays by Nasal route 2 times daily, Disp: , Rfl:     prochlorperazine (COMPAZINE) 10 MG tablet, Take 10 mg by mouth every 8 hours as needed, Disp: , Rfl:     albuterol sulfate  (90 BASE) MCG/ACT inhaler, Inhale 2 puffs into the lungs every 6 hours as needed for Wheezing, Disp: , Rfl:     montelukast (SINGULAIR) 10 MG tablet, Take 10 mg by mouth nightly., Disp: , Rfl:     gabapentin (NEURONTIN) 600 MG tablet, Take 600 mg by mouth nightly , Disp: , Rfl:     pantoprazole (PROTONIX) 40 MG tablet, Take 40 mg by mouth 2 times daily , Disp: , Rfl:     meloxicam (MOBIC) 15 MG tablet, Take 15 mg by mouth nightly , Disp: , Rfl:     naloxone (NARCAN) 4 MG/0.1ML LIQD nasal spray, Narcan 4 mg/actuation Sexual activity: Not Currently   Lifestyle    Physical activity:     Days per week: Not on file     Minutes per session: Not on file    Stress: Not on file   Relationships    Social connections:     Talks on phone: Not on file     Gets together: Not on file     Attends [employer] service: Not on file     Active member of club or organization: Not on file     Attends meetings of clubs or organizations: Not on file     Relationship status: Not on file    Intimate partner violence:     Fear of current or ex partner: Not on file     Emotionally abused: Not on file     Physically abused: Not on file     Forced sexual activity: Not on file   Other Topics Concern    Not on file   Social History Narrative    Not on file     Family History   Problem Relation Age of Onset    Other Mother     High Blood Pressure Mother     Cancer Father     High Blood Pressure Brother     High Blood Pressure Maternal Grandfather          REVIEW OF SYSTEMS:     General/Constitution:  (-)weight loss, (-)fever, (-)chills, (-)weakness. Skin: (-) rash,(-) psoriasis,(-) eczema, (-)skin cancer. Musculoskeletal: (-) fractures,  (-) dislocations,(-) collagen vascular disease, (-) fibromyalgia, (-) multiple sclerosis, (-) muscular dystrophy, (-) RSD,(-) joint pain (-)swelling, (-) joint pain,swelling. Neurologic: (-) epilepsy, (-)seizures,(-) brain tumor,(-) TIA, (-)stroke, (-)headaches, (-)Parkinson disease,(-) memory loss, (-) LOC. Cardiovascular: (-) Chest pain, (-) swelling in legs/feet, (-) SOB, (-) cramping in legs/feet with walking. Respiratory: (-) SOB, (-) Coughing, (-) night sweats. GI: (-) nausea, (-) vomiting, (-) diarrhea, (-) blood in stool, (-) gastric ulcer. Psychiatric: (-) Depression, (-) Anxiety, (-) bipolar disease, (-) Alzheimer's Disease  Allergic/Immunologic: (-) allergies latex, (-) allergies metal, (-) skin sensitivity.   Hematlogic: (-) anemia, (-) blood transfusion, (-) DVT/PE, (-) Clotting degrees internal rotation and 25 degrees external rotation, the hip joint is stable to testing. Strength of the lower extremity is limited by pain. The patient does have knee pain, and is described as  mild. Hip exam- Gait: antalgic; Strength: Hip Flexors 5/5; Hip Abductors 5/5; Hip Adduction 5/5. Knee exam :  Upon visual inspection there is not deformity noted. She does have  pain on motion, there is tenderness over the  lateral, anterior region. Range of motion of R. Knee is 0 to 120, and L. Knee is 0 to 115. there are not any masses, there is not ligamentous instability, there is not  deformity noted. Xrays:  Left hip OA and left knee lateral compartment OA    Radiographic findings reviewed with patient    Impression:  Encounter Diagnoses   Name Primary?  Primary osteoarthritis of left knee Yes    Primary osteoarthritis of left hip        Plan:  Natural history and expected course discussed. Questions answered. Educational materials distributed. Home exercises discussed. IR injection left hip  I had a lengthy discussion with the patient regarding their diagnosis. I explained treatment options including surgical vs non surgical treatment. I reviewed in detail the risks and benefits and outlined the procedure in detail with expected outcomes and possible complications. I also discussed non surgical treatment such as injections (CSI and visco supplementation), physical therapy, topical creams and NSAID's. They have elected for conservative management at this time. I will proceed with a cortisone injection in the Left knee. Verbal and written consent was obtained for the injections. The skin was prepped with alcohol. 1mL of Kenalog 40mg and 9mL of 0.25% Marcaine was  injected to Left knee. The injection was given through the lateral side of the knee. The patient tolerated the injection well.  I will see the patient back after visco approval   The patient has failed conservative measures such as NSAIDS, HEP, and cortisone injections. She is an excellent candidate for viscous supplementation injections including supartz  in the Left knee.    We will contact the patient's insurance company and see them back in the office once we have received approval.

## 2020-01-13 ENCOUNTER — HOSPITAL ENCOUNTER (OUTPATIENT)
Dept: INTERVENTIONAL RADIOLOGY/VASCULAR | Age: 55
Discharge: HOME | End: 2020-01-13
Payer: COMMERCIAL

## 2020-01-13 PROCEDURE — 6360000002 HC RX W HCPCS: Performed by: RADIOLOGY

## 2020-01-13 PROCEDURE — 20610 DRAIN/INJ JOINT/BURSA W/O US: CPT | Performed by: RADIOLOGY

## 2020-01-13 PROCEDURE — 77002 NEEDLE LOCALIZATION BY XRAY: CPT | Performed by: RADIOLOGY

## 2020-01-13 PROCEDURE — 2500000003 HC RX 250 WO HCPCS: Performed by: RADIOLOGY

## 2020-01-13 PROCEDURE — 6360000004 HC RX CONTRAST MEDICATION: Performed by: RADIOLOGY

## 2020-01-13 RX ORDER — BUPIVACAINE HYDROCHLORIDE 2.5 MG/ML
2 INJECTION, SOLUTION EPIDURAL; INFILTRATION; INTRACAUDAL ONCE
Status: DISCONTINUED | OUTPATIENT
Start: 2020-01-13 | End: 2020-01-13 | Stop reason: ALTCHOICE

## 2020-01-13 RX ORDER — TRIAMCINOLONE ACETONIDE 40 MG/ML
40 INJECTION, SUSPENSION INTRA-ARTICULAR; INTRAMUSCULAR ONCE
Status: DISCONTINUED | OUTPATIENT
Start: 2020-01-13 | End: 2020-01-13 | Stop reason: ALTCHOICE

## 2020-01-13 RX ADMIN — TRIAMCINOLONE ACETONIDE 40 MG: 40 INJECTION, SUSPENSION INTRA-ARTICULAR; INTRAMUSCULAR at 13:56

## 2020-01-13 RX ADMIN — IOPAMIDOL 3 ML: 612 INJECTION, SOLUTION INTRATHECAL at 13:55

## 2020-01-13 RX ADMIN — BUPIVACAINE HYDROCHLORIDE 5 MG: 2.5 INJECTION, SOLUTION EPIDURAL; INFILTRATION; INTRACAUDAL; PERINEURAL at 13:55

## 2020-01-13 ASSESSMENT — PAIN SCALES - GENERAL: PAINLEVEL_OUTOF10: 5

## 2020-06-23 ENCOUNTER — OFFICE VISIT (OUTPATIENT)
Dept: ORTHOPEDIC SURGERY | Age: 55
End: 2020-06-23
Payer: COMMERCIAL

## 2020-06-23 VITALS — WEIGHT: 250 LBS | HEIGHT: 61 IN | BODY MASS INDEX: 47.2 KG/M2

## 2020-06-23 PROCEDURE — 3017F COLORECTAL CA SCREEN DOC REV: CPT | Performed by: ORTHOPAEDIC SURGERY

## 2020-06-23 PROCEDURE — 20610 DRAIN/INJ JOINT/BURSA W/O US: CPT | Performed by: ORTHOPAEDIC SURGERY

## 2020-06-23 PROCEDURE — 99214 OFFICE O/P EST MOD 30 MIN: CPT | Performed by: ORTHOPAEDIC SURGERY

## 2020-06-23 PROCEDURE — 1036F TOBACCO NON-USER: CPT | Performed by: ORTHOPAEDIC SURGERY

## 2020-06-23 PROCEDURE — G8417 CALC BMI ABV UP PARAM F/U: HCPCS | Performed by: ORTHOPAEDIC SURGERY

## 2020-06-23 PROCEDURE — G8427 DOCREV CUR MEDS BY ELIG CLIN: HCPCS | Performed by: ORTHOPAEDIC SURGERY

## 2020-06-23 RX ORDER — TRIAMCINOLONE ACETONIDE 40 MG/ML
40 INJECTION, SUSPENSION INTRA-ARTICULAR; INTRAMUSCULAR ONCE
Status: COMPLETED | OUTPATIENT
Start: 2020-06-23 | End: 2020-06-23

## 2020-06-23 RX ADMIN — TRIAMCINOLONE ACETONIDE 40 MG: 40 INJECTION, SUSPENSION INTRA-ARTICULAR; INTRAMUSCULAR at 11:26

## 2020-06-23 NOTE — PROGRESS NOTES
Chief Complaint   Patient presents with    Hip Pain     Left knee and hip pain. She is in PT. Her pain has improved but still having left hip. Lana Gibbs  Is a 54 y.o. female  is following up today with left hip pain. Patient states pain level is a 8/10. She has tried cortisone and  physical therapy, which has been not very effective.     Past Medical History:   Diagnosis Date    Anxiety     Arthritis     Asthma     Chronic headaches     Chronic pain     Depression     GERD (gastroesophageal reflux disease)     History of cardiovascular stress test 05/2015    dobutamine stress test    Hypertension     Incontinence in female     Irritable bowel syndrome     Neuromuscular disorder ([facility] [address]     Neuropathy     Prolonged emergence from general anesthesia     sometimes slow to wake up slow to go to sleep     Past Surgical History:   Procedure Laterality Date    BONE GRAFT      BRONCHOSCOPY N/A 3/27/2018    BRONCHOSCOPY DIAGNOSTIC OR CELL [address] ONLY performed by Leisa Almaraz MD at [address] [place] [address]  3/27/2018    BRONCHOSCOPY BRUSHINGS performed by Leisa Almaraz MD at [address] [place] [address]      CYST REMOVAL      DILATION AND CURETTAGE OF UTERUS      ENDOSCOPY, COLON, DIAGNOSTIC  02/09/2017    FOOT SURGERY      FRACTURE SURGERY      right humerus    UPPER GASTROINTESTINAL ENDOSCOPY         Current Outpatient Medications:     loratadine (CLARITIN) 10 MG tablet, loratadine 10 mg tablet, Disp: , Rfl:     naloxone (NARCAN) 4 MG/0.1ML LIQD nasal spray, Narcan 4 mg/actuation nasal spray, Disp: , Rfl:     oxyCODONE-acetaminophen (PERCOCET)  MG per tablet, oxycodone-acetaminophen 10 mg-325 mg tablet, Disp: , Rfl:     lamoTRIgine (LAMICTAL) 25 MG tablet, lamotrigine 25 mg tablet, Disp: , Rfl:     metoprolol tartrate (LOPRESSOR) 50 MG tablet, Take 50 mg by mouth 2 times daily, Disp: , Rfl:     tiZANidine (ZANAFLEX) 4 MG tablet, Take 1 positive for moderate crepitations, some mild tenderness laxity is not noted with  stress. Xrays: The hip is well aligned. Mild arthritic narrowing is seen with   marginal osteophytes. Femoral head and neck junction normal in   contour. No fractures identified. The bony pelvis appears normal.     FINDINGS: Moderate tricompartmental degenerative changes are noted,   greatest in the lateral compartment. There is no joint effusion. There   is normal alignment. The patella is well located.  No fractures are   identified. Radiographic findings reviewed with patient      Assessment:  Encounter Diagnoses   Name Primary?  Primary osteoarthritis of left knee Yes    Primary osteoarthritis of left hip     Primary osteoarthritis of right knee        Plan:  Natural history and expected course discussed. Questions answered. Educational materials distributed. Home exercises discussed. NSAIDs per medication orders. I had a lengthy discussion with the patient regarding their diagnosis. I explained treatment options including surgical vs non surgical treatment. I reviewed in detail the risks and benefits and outlined the procedure in detail with expected outcomes and possible complications. I also discussed non surgical treatment such as injections (CSI and visco supplementation), physical therapy, topical creams and NSAID's. They have elected for conservative management at this time. I discussed with patient that she would need to lose 35 lbs. Prior to any surgical intervention. Patient persistent with wanting surgery. I did offer her a second opinion up at [address] [place] [address], she would like to try steroid injection to left hip and we will work on getting her Stephanie Sis approved   The patient has failed conservative measures such as NSAIDS, HEP, and cortisone injections. She is an excellent candidate for viscous supplementation injections including supartz in the Bilateral knee.    We will contact the patient's insurance company and see them back in the office once we have received approval.   I will proceed with a cortisone injection in the Bilateral knees. Verbal and written consent was obtained for the injections. Skin was prepped with alcohol. 1 ml of Kenalog 40mg and 9 ml of 0.25% Marcaine was  injected to Bilateral knees. The injections were given through the lateral side of the knees. The patient tolerated the injections well. I will see the patient back after visco approval  At least 25 minutes was spent discussing the diagnosis and treatment options with the patient with at least 50% of the time was spent with decision making and counseling the patient.

## 2020-07-07 ENCOUNTER — HOSPITAL ENCOUNTER (OUTPATIENT)
Age: 55
Discharge: HOME | End: 2020-07-09
Payer: COMMERCIAL

## 2020-07-07 PROCEDURE — U0003 INFECTIOUS AGENT DETECTION BY NUCLEIC ACID (DNA OR RNA); SEVERE ACUTE RESPIRATORY SYNDROME CORONAVIRUS 2 (SARS-COV-2) (CORONAVIRUS DISEASE [COVID-19]), AMPLIFIED PROBE TECHNIQUE, MAKING USE OF HIGH THROUGHPUT TECHNOLOGIES AS DESCRIBED BY CMS-2020-01-R: HCPCS

## 2020-07-08 LAB
SARS-COV-2: NOT DETECTED
SOURCE: NORMAL

## 2020-07-14 ENCOUNTER — HOSPITAL ENCOUNTER (OUTPATIENT)
Dept: INTERVENTIONAL RADIOLOGY/VASCULAR | Age: 55
Discharge: HOME | End: 2020-07-14
Payer: COMMERCIAL

## 2020-07-14 PROCEDURE — 20610 DRAIN/INJ JOINT/BURSA W/O US: CPT

## 2020-07-14 PROCEDURE — 2500000003 HC RX 250 WO HCPCS: Performed by: RADIOLOGY

## 2020-07-14 PROCEDURE — 77002 NEEDLE LOCALIZATION BY XRAY: CPT

## 2020-07-14 PROCEDURE — 6360000004 HC RX CONTRAST MEDICATION: Performed by: RADIOLOGY

## 2020-07-14 PROCEDURE — 6360000002 HC RX W HCPCS: Performed by: RADIOLOGY

## 2020-07-14 RX ORDER — BUPIVACAINE HYDROCHLORIDE 2.5 MG/ML
2 INJECTION, SOLUTION EPIDURAL; INFILTRATION; INTRACAUDAL ONCE
Status: COMPLETED | OUTPATIENT
Start: 2020-07-14 | End: 2020-07-14

## 2020-07-14 RX ORDER — TRIAMCINOLONE ACETONIDE 40 MG/ML
40 INJECTION, SUSPENSION INTRA-ARTICULAR; INTRAMUSCULAR ONCE
Status: COMPLETED | OUTPATIENT
Start: 2020-07-14 | End: 2020-07-14

## 2020-07-14 RX ADMIN — BUPIVACAINE HYDROCHLORIDE 5 MG: 2.5 INJECTION, SOLUTION EPIDURAL; INFILTRATION; INTRACAUDAL at 14:15

## 2020-07-14 RX ADMIN — IOPAMIDOL 3 ML: 612 INJECTION, SOLUTION INTRATHECAL at 14:15

## 2020-07-14 RX ADMIN — TRIAMCINOLONE ACETONIDE 40 MG: 40 INJECTION, SUSPENSION INTRA-ARTICULAR; INTRAMUSCULAR at 14:15

## 2020-07-14 ASSESSMENT — PAIN SCALES - GENERAL: PAINLEVEL_OUTOF10: 5

## 2020-08-05 ENCOUNTER — TELEPHONE (OUTPATIENT)
Dept: ORTHOPEDIC SURGERY | Age: 55
End: 2020-08-05

## 2020-08-21 ENCOUNTER — TELEPHONE (OUTPATIENT)
Dept: ADMINISTRATIVE | Age: 55
End: 2020-08-21

## 2020-09-01 ENCOUNTER — OFFICE VISIT (OUTPATIENT)
Dept: ORTHOPEDIC SURGERY | Age: 55
End: 2020-09-01
Payer: COMMERCIAL

## 2020-09-01 VITALS — HEIGHT: 61 IN | WEIGHT: 250 LBS | BODY MASS INDEX: 47.2 KG/M2

## 2020-09-01 PROCEDURE — G8427 DOCREV CUR MEDS BY ELIG CLIN: HCPCS | Performed by: ORTHOPAEDIC SURGERY

## 2020-09-01 PROCEDURE — G8417 CALC BMI ABV UP PARAM F/U: HCPCS | Performed by: ORTHOPAEDIC SURGERY

## 2020-09-01 PROCEDURE — 3017F COLORECTAL CA SCREEN DOC REV: CPT | Performed by: ORTHOPAEDIC SURGERY

## 2020-09-01 PROCEDURE — 99214 OFFICE O/P EST MOD 30 MIN: CPT | Performed by: ORTHOPAEDIC SURGERY

## 2020-09-01 PROCEDURE — 1036F TOBACCO NON-USER: CPT | Performed by: ORTHOPAEDIC SURGERY

## 2020-09-01 RX ORDER — ETODOLAC 400 MG/1
TABLET, FILM COATED ORAL
COMMUNITY
Start: 2020-08-17 | End: 2022-07-05 | Stop reason: SINTOL

## 2020-09-01 RX ORDER — HYDROXYZINE PAMOATE 25 MG/1
25 CAPSULE ORAL 3 TIMES DAILY PRN
COMMUNITY

## 2020-09-01 NOTE — PROGRESS NOTES
lamotrigine 25 mg tablet, Disp: , Rfl:     metoprolol tartrate (LOPRESSOR) 50 MG tablet, Take 50 mg by mouth 2 times daily, Disp: , Rfl:     tiZANidine (ZANAFLEX) 4 MG tablet, Take 1 tablet by mouth 3 times daily, Disp: 90 tablet, Rfl: 1    OXYGEN, Inhale into the lungs nightly, Disp: , Rfl:     olopatadine (PATANOL) 0.1 % ophthalmic solution, Place 1 drop into the right eye 2 times daily, Disp: , Rfl:     mometasone (ASMANEX HFA) 100 MCG/ACT AERO inhaler, Inhale 2 puffs into the lungs 2 times daily, Disp: , Rfl:     cloNIDine (CATAPRES) 0.2 MG tablet, Take 0.2 mg by mouth nightly, Disp: , Rfl:     fluconazole (DIFLUCAN) 200 MG tablet, Take 200 mg by mouth daily, Disp: , Rfl:     senna (SENOKOT) 8.6 MG tablet, Take 1 tablet by mouth as needed for Constipation, Disp: , Rfl:     albuterol (ACCUNEB) 0.63 MG/3ML nebulizer solution, Take 1 ampule by nebulization every 6 hours as needed for Wheezing, Disp: , Rfl:     VORTIoxetine HBr (TRINTELLIX) 20 MG TABS tablet, Take 10 mg by mouth daily , Disp: , Rfl:     fluticasone (FLONASE) 50 MCG/ACT nasal spray, 2 sprays by Nasal route 2 times daily, Disp: , Rfl:     diphenoxylate-atropine (LOMOTIL) 2.5-0.025 MG per tablet, Take 1 tablet by mouth 4 times daily as needed for Diarrhea, Disp: , Rfl:     prochlorperazine (COMPAZINE) 10 MG tablet, Take 10 mg by mouth every 8 hours as needed, Disp: , Rfl:     albuterol sulfate  (90 BASE) MCG/ACT inhaler, Inhale 2 puffs into the lungs every 6 hours as needed for Wheezing, Disp: , Rfl:     dicyclomine (BENTYL) 20 MG tablet, Take 20 mg by mouth every 6 hours as needed, Disp: , Rfl:     ARIPiprazole (ABILIFY) 30 MG tablet, Take 30 mg by mouth daily , Disp: , Rfl:     ALPRAZolam (XANAX) 2 MG tablet, Take 2 mg by mouth 3 times daily as needed for Sleep or Anxiety , Disp: , Rfl:     montelukast (SINGULAIR) 10 MG tablet, Take 10 mg by mouth nightly., Disp: , Rfl:     gabapentin (NEURONTIN) 600 MG tablet, Take 600 mg by mouth nightly , Disp: , Rfl:     diclofenac sodium 1 % GEL, Apply 2 g topically 2 times daily. , Disp: , Rfl:     pantoprazole (PROTONIX) 40 MG tablet, Take 40 mg by mouth 2 times daily , Disp: , Rfl:   Allergies   Allergen Reactions    Cymbalta [Duloxetine Hcl] Other (See Comments)     anger    Dexlansoprazole Nausea Only    Diovan [Valsartan] Other (See Comments)     Cough, sore throat    Ditropan [Oxybutynin] Other (See Comments)     hoarsness    Lunesta [Eszopiclone] Other (See Comments)     Bad dreams    Molds & Smuts     Norvasc [Amlodipine Besylate] Other (See Comments)     cough    Sular [Nisoldipine Er] Other (See Comments)     Cough      Tape Arthurine Fuchs Tape]      rash    Dextromethorphan Polistirex Er Rash    Levaquin [Levofloxacin] Rash    Other Rash     Strawberries, wheat, peppers    Questran [Cholestyramine] Rash    Yellow Dyes (Non-Tartrazine) Rash     Yellow dye #6, (#11 & #5 if combined)     Social History     Socioeconomic History    Marital status: Single     Spouse name: Not on file    Number of children: Not on file    Years of education: Not on file    Highest education level: Not on file   Occupational History    Not on file   Social Needs    Financial resource strain: Not on file    Food insecurity     Worry: Not on file     Inability: Not on file    Transportation needs     Medical: Not on file     Non-medical: Not on file   Tobacco Use    Smoking status: Never Smoker    Smokeless tobacco: Never Used   Substance and Sexual Activity    Alcohol use: No    Drug use: No    Sexual activity: Not Currently   Lifestyle    Physical activity     Days per week: Not on file     Minutes per session: Not on file    Stress: Not on file   Relationships    Social connections     Talks on phone: Not on file     Gets together: Not on file     Attends Quaker service: Not on file     Active member of club or organization: Not on file     Attends meetings of clubs or organizations: Not on file     Relationship status: Not on file    Intimate partner violence     Fear of current or ex partner: Not on file     Emotionally abused: Not on file     Physically abused: Not on file     Forced sexual activity: Not on file   Other Topics Concern    Not on file   Social History Narrative    Not on file     Family History   Problem Relation Age of Onset    Other Mother     High Blood Pressure Mother     Cancer Father     High Blood Pressure Brother     High Blood Pressure Maternal Grandfather        REVIEW OF SYSTEMS:     General/Constitution:  (-)weight loss, (-)fever, (-)chills, (-)weakness. Skin: (-) rash,(-) psoriasis,(-) eczema, (-)skin cancer. Musculoskeletal: (-) fractures,  (-) dislocations,(-) collagen vascular disease, (-) fibromyalgia, (-) multiple sclerosis, (-) muscular dystrophy, (-) RSD,(-) joint pain (-)swelling, (-) joint pain,swelling. Neurologic: (-) epilepsy, (-)seizures,(-) brain tumor,(-) TIA, (-)stroke, (-)headaches, (-)Parkinson disease,(-) memory loss, (-) LOC. Cardiovascular: (-) Chest pain, (-) swelling in legs/feet, (-) SOB, (-) cramping in legs/feet with walking. Respiratory: (-) SOB, (-) Coughing, (-) night sweats. GI: (-) nausea, (-) vomiting, (-) diarrhea, (-) blood in stool, (-) gastric ulcer. Psychiatric: (-) Depression, (-) Anxiety, (-) bipolar disease, (-) Alzheimer's Disease  Allergic/Immunologic: (-) allergies latex, (-) allergies metal, (-) skin sensitivity. Hematlogic: (-) anemia, (-) blood transfusion, (-) DVT/PE, (-) Clotting disorders    Constitutional:  The patient is alert and oriented x 3, appears to be stated age and in no distress. Ht 5' 1\" (1.549 m)   Wt 250 lb (113.4 kg)   BMI 47.24 kg/m²     Skin:  Upon inspection: the skin appears warm, dry and intact. There is not a previous scar over the affected area. There is not any cellulitis, lymphedema or cutaneous lesions noted in the lower extremities.    Upon palpation there is no induration noted. Neurologic:  Gait: antalgic; Motor exam of the lower extremities show ; quadriceps, hamstrings, foot dorsi and plantar flexors intact R.  5/5 and L. 5/5. Deep tendon reflexes are 2/4 at the knees and 2/4 at the ankles with strong extensor hallicus longus motor strength bilaterally. Sensory to both feet is intact to all sensory roots. Cardiovascular: The vascular exam is normal and is well perfused to distal extremities. Distal pulses DP/PT: R. 2+; L. 2+. There is cap refill noted less than two seconds in all digits. There is not edema of the bilateral lower extremities. There is not varicosities noted in the distal extremities. Lymph:  Upon palpation,  there is no lymphadenopathy noted in bilateral lower extremities. Musculoskeletal:  Gait: antalgic; examination of the nails and digits reveal no cyanosis or clubbing. Lumbar exam:  On visual inspection, there is not deformity of the spine. full range of motion, no tenderness, palpable spasm or pain on motion. Special tests: Straight Leg Raise negative, Ana testnegative. Hip exam:  Upon visual inspection there is not a deformity noted. Patient complains of tenderness at the  groin. Exam of the left hip shows none leg length discrepancy. Range of motion of the involved/uninvolved hip is 5 degrees internal rotation and 10 degrees external rotation/20 degrees internal rotation and 30 degrees external rotation, the hip joint is stable to testing. Strength of the lower extremity is limited by pain. The patient does not have ipsilateral knee pain, and is described as  none. Hip exam- Gait: antalgic; Strength: Hip Flexors 4/5; Hip Abductors 5/5; Hip Adduction 5/5. Knee exam   left knee exam shows;  range of motion of R. Knee is 0 to 115, and L. Knee is 0 to 110.  The patient does have  pain on motion, effusion is mild, there is tenderness over the  medial region, there are not any masses, there is not ligamentous instability, there is  deformity noted. Knee exam: the injured knee reveals antalgic gait. Knee exam: left positive for moderate crepitations, some mild tenderness laxity is not noted with  stress. Xrays: The hip is well aligned. Mild arthritic narrowing is seen with   marginal osteophytes. Femoral head and neck junction normal in   contour. No fractures identified. The bony pelvis appears normal.     FINDINGS: Moderate tricompartmental degenerative changes are noted,   greatest in the lateral compartment. There is no joint effusion. There   is normal alignment. The patella is well located.  No fractures are   identified. Radiographic findings reviewed with patient      Assessment:  Encounter Diagnoses   Name Primary?  Primary osteoarthritis of left hip Yes    Primary osteoarthritis of right knee     Primary osteoarthritis of left knee        Plan:  Natural history and expected course discussed. Questions answered. Educational materials distributed. Home exercises discussed. NSAIDs per medication orders. I had a lengthy discussion with the patient regarding their diagnosis. I explained treatment options including surgical vs non surgical treatment. I reviewed in detail the risks and benefits and outlined the procedure in detail with expected outcomes and possible complications. I also discussed non surgical treatment such as injections (CSI and visco supplementation), physical therapy, topical creams and NSAID's. They have elected for conservative management at this time. Pain clinic referral  Weight loss specialist referral  Fu prn  At least 25 minutes was spent discussing the diagnosis and treatment options with the patient with at least 50% of the time was spent with decision making and counseling the patient.

## 2020-09-15 ENCOUNTER — OFFICE VISIT (OUTPATIENT)
Dept: ORTHOPEDIC SURGERY | Age: 55
End: 2020-09-15
Payer: COMMERCIAL

## 2020-09-15 VITALS — HEIGHT: 61 IN | BODY MASS INDEX: 47.2 KG/M2 | WEIGHT: 250 LBS

## 2020-09-15 PROCEDURE — 20610 DRAIN/INJ JOINT/BURSA W/O US: CPT | Performed by: ORTHOPAEDIC SURGERY

## 2020-09-15 PROCEDURE — 99214 OFFICE O/P EST MOD 30 MIN: CPT | Performed by: ORTHOPAEDIC SURGERY

## 2020-09-15 PROCEDURE — G8427 DOCREV CUR MEDS BY ELIG CLIN: HCPCS | Performed by: ORTHOPAEDIC SURGERY

## 2020-09-15 PROCEDURE — 1036F TOBACCO NON-USER: CPT | Performed by: ORTHOPAEDIC SURGERY

## 2020-09-15 PROCEDURE — G8417 CALC BMI ABV UP PARAM F/U: HCPCS | Performed by: ORTHOPAEDIC SURGERY

## 2020-09-15 PROCEDURE — 3017F COLORECTAL CA SCREEN DOC REV: CPT | Performed by: ORTHOPAEDIC SURGERY

## 2020-09-15 RX ORDER — TRIAMCINOLONE ACETONIDE 40 MG/ML
40 INJECTION, SUSPENSION INTRA-ARTICULAR; INTRAMUSCULAR ONCE
Status: COMPLETED | OUTPATIENT
Start: 2020-09-15 | End: 2020-09-15

## 2020-09-15 RX ORDER — ETODOLAC 400 MG/1
400 TABLET, FILM COATED ORAL 2 TIMES DAILY
COMMUNITY
End: 2020-09-23

## 2020-09-15 RX ORDER — HYDROCODONE BITARTRATE AND ACETAMINOPHEN 10; 325 MG/1; MG/1
1 TABLET ORAL EVERY 6 HOURS PRN
COMMUNITY
End: 2020-09-15

## 2020-09-15 RX ADMIN — TRIAMCINOLONE ACETONIDE 40 MG: 40 INJECTION, SUSPENSION INTRA-ARTICULAR; INTRAMUSCULAR at 11:32

## 2020-09-15 NOTE — PROGRESS NOTES
Chief Complaint   Patient presents with    Shoulder Pain     Having raising her arm due to shoulder pain    Arm Pain     c/o arm numbness and coldness        Zaire Beal is a 54y.o. year old   female who is seen today  for evaluation of right shoulder pain. She reports the pain has been ongoing for the past few months. She does recall a specific injury which started the pain. She had an injujry several years ago requiring ORIF. She reports the pain is worse with movtion, better with rest.  The patient does not have mechanical symptoms. Shedoes have night pain. She denies a feeling of instability. The prior treatments have been none. The patient   has not responded to the treatment. The patient is right hand dominant.      Chief Complaint   Patient presents with    Shoulder Pain     Having raising her arm due to shoulder pain    Arm Pain     c/o arm numbness and coldness     Past Medical History:   Diagnosis Date    Anxiety     Arthritis     Asthma     Chronic headaches     Chronic pain     Depression     GERD (gastroesophageal reflux disease)     History of cardiovascular stress test 05/2015    dobutamine stress test    Hypertension     Incontinence in female     Irritable bowel syndrome     Neuromuscular disorder ([facility] [address]     Neuropathy     Prolonged emergence from general anesthesia     sometimes slow to wake up slow to go to sleep     Past Surgical History:   Procedure Laterality Date    BONE GRAFT      BRONCHOSCOPY N/A 3/27/2018    BRONCHOSCOPY DIAGNOSTIC OR CELL [address] ONLY performed by Cat Gan MD at [address]  3/27/2018    BRONCHOSCOPY BRUSHINGS performed by Cat Gan MD at [facility] 7 COLONOSCOPY      CYST REMOVAL      DILATION AND CURETTAGE OF UTERUS      ENDOSCOPY, COLON, DIAGNOSTIC  02/09/2017    FOOT SURGERY      FRACTURE SURGERY      right humerus    UPPER GASTROINTESTINAL ENDOSCOPY Current Outpatient Medications:     etodolac (LODINE) 400 MG tablet, Take 400 mg by mouth 2 times daily, Disp: , Rfl:     etodolac (LODINE) 400 MG tablet, TAKE ONE TABLET BY MOUTH TWO TIMES A DAY WITH FOOD, Disp: , Rfl:     hydrOXYzine (VISTARIL) 25 MG capsule, Take 25 mg by mouth 3 times daily as needed for Itching, Disp: , Rfl:     loratadine (CLARITIN) 10 MG tablet, loratadine 10 mg tablet, Disp: , Rfl:     naloxone (NARCAN) 4 MG/0.1ML LIQD nasal spray, Narcan 4 mg/actuation nasal spray, Disp: , Rfl:     oxyCODONE-acetaminophen (PERCOCET)  MG per tablet, oxycodone-acetaminophen 10 mg-325 mg tablet, Disp: , Rfl:     lamoTRIgine (LAMICTAL) 25 MG tablet, lamotrigine 25 mg tablet, Disp: , Rfl:     metoprolol tartrate (LOPRESSOR) 50 MG tablet, Take 50 mg by mouth 2 times daily, Disp: , Rfl:     tiZANidine (ZANAFLEX) 4 MG tablet, Take 1 tablet by mouth 3 times daily, Disp: 90 tablet, Rfl: 1    OXYGEN, Inhale into the lungs nightly, Disp: , Rfl:     olopatadine (PATANOL) 0.1 % ophthalmic solution, Place 1 drop into the right eye 2 times daily, Disp: , Rfl:     mometasone (ASMANEX HFA) 100 MCG/ACT AERO inhaler, Inhale 2 puffs into the lungs 2 times daily, Disp: , Rfl:     cloNIDine (CATAPRES) 0.2 MG tablet, Take 0.2 mg by mouth nightly, Disp: , Rfl:     fluconazole (DIFLUCAN) 200 MG tablet, Take 200 mg by mouth daily, Disp: , Rfl:     senna (SENOKOT) 8.6 MG tablet, Take 1 tablet by mouth as needed for Constipation, Disp: , Rfl:     albuterol (ACCUNEB) 0.63 MG/3ML nebulizer solution, Take 1 ampule by nebulization every 6 hours as needed for Wheezing, Disp: , Rfl:     VORTIoxetine HBr (TRINTELLIX) 20 MG TABS tablet, Take 10 mg by mouth daily , Disp: , Rfl:     fluticasone (FLONASE) 50 MCG/ACT nasal spray, 2 sprays by Nasal route 2 times daily, Disp: , Rfl:     diphenoxylate-atropine (LOMOTIL) 2.5-0.025 MG per tablet, Take 1 tablet by mouth 4 times daily as needed for Diarrhea, Disp: , Rfl:   prochlorperazine (COMPAZINE) 10 MG tablet, Take 10 mg by mouth every 8 hours as needed, Disp: , Rfl:     albuterol sulfate  (90 BASE) MCG/ACT inhaler, Inhale 2 puffs into the lungs every 6 hours as needed for Wheezing, Disp: , Rfl:     dicyclomine (BENTYL) 20 MG tablet, Take 20 mg by mouth every 6 hours as needed, Disp: , Rfl:     ARIPiprazole (ABILIFY) 30 MG tablet, Take 30 mg by mouth daily , Disp: , Rfl:     ALPRAZolam (XANAX) 2 MG tablet, Take 2 mg by mouth 3 times daily as needed for Sleep or Anxiety , Disp: , Rfl:     montelukast (SINGULAIR) 10 MG tablet, Take 10 mg by mouth nightly., Disp: , Rfl:     gabapentin (NEURONTIN) 600 MG tablet, Take 600 mg by mouth nightly , Disp: , Rfl:     diclofenac sodium 1 % GEL, Apply 2 g topically 2 times daily. , Disp: , Rfl:     pantoprazole (PROTONIX) 40 MG tablet, Take 40 mg by mouth 2 times daily , Disp: , Rfl:   Allergies   Allergen Reactions    Cymbalta [Duloxetine Hcl] Other (See Comments)     anger    Dexlansoprazole Nausea Only    Diovan [Valsartan] Other (See Comments)     Cough, sore throat    Ditropan [Oxybutynin] Other (See Comments)     hoarsness    Lunesta [Eszopiclone] Other (See Comments)     Bad dreams    Molds & Smuts     Norvasc [Amlodipine Besylate] Other (See Comments)     cough    Sular [Nisoldipine Er] Other (See Comments)     Cough      Tape Medhat Flakes Tape]      rash    Dextromethorphan Polistirex Er Rash    Levaquin [Levofloxacin] Rash    Other Rash     Strawberries, wheat, peppers    Questran [Cholestyramine] Rash    Yellow Dyes (Non-Tartrazine) Rash     Yellow dye #6, (#11 & #5 if combined)     Social History     Socioeconomic History    Marital status: Single     Spouse name: Not on file    Number of children: Not on file    Years of education: Not on file    Highest education level: Not on file   Occupational History    Not on file   Social Needs    Financial resource strain: Not on file    Food insecurity Worry: Not on file     Inability: Not on file    Transportation needs     Medical: Not on file     Non-medical: Not on file   Tobacco Use    Smoking status: Never Smoker    Smokeless tobacco: Never Used   Substance and Sexual Activity    Alcohol use: No    Drug use: No    Sexual activity: Not Currently   Lifestyle    Physical activity     Days per week: Not on file     Minutes per session: Not on file    Stress: Not on file   Relationships    Social connections     Talks on phone: Not on file     Gets together: Not on file     Attends Holiness service: Not on file     Active member of club or organization: Not on file     Attends meetings of clubs or organizations: Not on file     Relationship status: Not on file    Intimate partner violence     Fear of current or ex partner: Not on file     Emotionally abused: Not on file     Physically abused: Not on file     Forced sexual activity: Not on file   Other Topics Concern    Not on file   Social History Narrative    Not on file     Family History   Problem Relation Age of Onset    Other Mother     High Blood Pressure Mother     Cancer Father     High Blood Pressure Brother     High Blood Pressure Maternal Grandfather        REVIEW OF SYSTEMS:     General/Constitution:  (-)weight loss, (-)fever, (-)chills, (-)weakness. Skin: (-) rash,(-) psoriasis,(-) eczema, (-)skin cancer. Musculoskeletal: (-) fractures,  (-) dislocations,(-) collagen vascular disease, (-) fibromyalgia, (-) multiple sclerosis, (-) muscular dystrophy, (-) RSD,(-) joint pain (-)swelling, (-) joint pain,swelling. Neurologic: (-) epilepsy, (-)seizures,(-) brain tumor,(-) TIA, (-)stroke, (-)headaches, (-)Parkinson disease,(-) memory loss, (-) LOC. Cardiovascular: (-) Chest pain, (-) swelling in legs/feet, (-) SOB, (-) cramping in legs/feet with walking. Respiratory: (-) SOB, (-) Coughing, (-) night sweats.   GI: (-) nausea, (-) vomiting, (-) diarrhea, (-) blood in stool, (-) gastric ulcer. Psychiatric: (-) Depression, (-) Anxiety, (-) bipolar disease, (-) Alzheimer's Disease  Allergic/Immunologic: (-) allergies latex, (-) allergies metal, (-) skin sensitivity. Hematlogic: (-) anemia, (-) blood transfusion, (-) DVT/PE, (-) Clotting disorders      Subjective:    Constitution:  Ht 5' 1\" (1.549 m)   Wt 250 lb (113.4 kg)   BMI 47.24 kg/m²     Psycihatric:  The patient is alert and oriented x 3, appears to be stated age and in no distress. Respiratory:  Respiratory effort is not labored. Patient is not gasping. Palpation of the chest reveals no tactile fremitus. Skin:  Upon inspection: the skin appears warm, dry and intact. There is not a previous scar over the affected area. There is not any cellulitis, lymphedema or cutaneous lesions noted in the lower extremities. Upon palpation there is no induration noted. Neurologic:  Motor exam of the upper extremities show: The reflexes in biceps/triceps/brachioradialis are equal and symmetric. Sensory exam C5-T1 are normal bilaterally. Cardiovascular: The vascular exam is normal and is well perfused to distal extremities. There are 2+ radial pulses bilaterally, and motor and sensation is intact to median, ulnar, and radial, musclocutaneus, and axillary nerve distribution and grossly symmetric bilaterally. There is cap refill noted less than two seconds in all digits. There is not edema of the bilateral upper extremities. There is not varicosities noted in the distal extremities. Lymph:  Upon palpation,  there is no lymphadenopathy noted in bilateral upper extremities. Musculoskeletal:  Gait: normal; examination of the nails and digits reveal no cyanosis or clubbing. Cervical Exam:  On physical exam, Katharine Haider is well-developed, well-nourished, oriented to person, place and time. her gait is normal.  On evaluation of hercervical spine, She has full range of motion of the cervical spine without pain. There is no cervical tenderness to palpation. Shoulder Exam:  On evaluation of her bilaterally upper extremities, her right shoulder has no deformity. There is tenderness upon palpation of the lateral shoulder. There is not evidence of scapular dyskinesis. There is not muscle atrophy in shoulder girdle. The range of motion for the Right Shoulder is 140/45/t10 and for the Left shoulder is 150/50/t8. Right shoulder Motor strength is 5/5 in the supraspinatus, 5/5 internal rotation and 5/5 in external rotation, and Left shoulder motor strength 5/5 in supraspinatus, 5/5 in internal rotation, 5/5 in external rotation. Right shoulder:  positive Impingement , positive Joseph ,negative  Speeds,negative  Apprehension ,negative Evans Load Shift, negative Singh manuver, negative Cross arm test.     Left shoulder:  negative Impingement , negative Joseph ,negative  Speeds,negative  Apprehension ,negative Evans Load Shift, negative Snigh manuver, negative Cross arm test.     XRAY:  Healed ORIF no complications    MRI:  n/a    Radiographic findings reviewed with patient    Impression:   Encounter Diagnosis   Name Primary?  Shoulder impingement, right Yes       Plan: Natural history and expected course discussed. Questions answered. Educational material distributed. Reduction in offending activity. Gentle ROM exercises  RICE therapy. NSAIDs per medication orders. I had a lengthy discussion with the patient regarding their diagnosis. I explained treatment options including surgical vs non surgical treatment. I reviewed in detail the risks and benefits and outlined the procedure in detail with expected outcomes and possible complications. I also discussed non surgical treatment such as injections (CSI and visco supplementation), physical therapy, topical creams and NSAID's. They have elected for conservative management at this time. I will proceed with a cortisone injection in the Right shoulder. Verbal and written consent was obtained for the injection. Skin was prepped with alcohol, 1 ml of Kenalog 40 mg and 9 ml of 0.25% Marcaine was injected to the posterior shoulder through the subacromial space of the Right Shoulder. The patient tolerated the injections well. I will see the patient back prn  HEP.   Discussed EMG if no improvement with intermittent tingling  Fu in 4 weeks

## 2020-09-23 ENCOUNTER — OFFICE VISIT (OUTPATIENT)
Dept: PAIN MANAGEMENT | Age: 55
End: 2020-09-23
Payer: COMMERCIAL

## 2020-09-23 VITALS
HEART RATE: 81 BPM | WEIGHT: 250 LBS | DIASTOLIC BLOOD PRESSURE: 82 MMHG | RESPIRATION RATE: 18 BRPM | HEIGHT: 61 IN | SYSTOLIC BLOOD PRESSURE: 146 MMHG | TEMPERATURE: 96.8 F | BODY MASS INDEX: 47.2 KG/M2 | OXYGEN SATURATION: 96 %

## 2020-09-23 PROCEDURE — 1036F TOBACCO NON-USER: CPT | Performed by: ANESTHESIOLOGY

## 2020-09-23 PROCEDURE — 99204 OFFICE O/P NEW MOD 45 MIN: CPT | Performed by: ANESTHESIOLOGY

## 2020-09-23 PROCEDURE — G8417 CALC BMI ABV UP PARAM F/U: HCPCS | Performed by: ANESTHESIOLOGY

## 2020-09-23 PROCEDURE — G8427 DOCREV CUR MEDS BY ELIG CLIN: HCPCS | Performed by: ANESTHESIOLOGY

## 2020-09-23 PROCEDURE — 99203 OFFICE O/P NEW LOW 30 MIN: CPT | Performed by: ANESTHESIOLOGY

## 2020-09-23 PROCEDURE — 3017F COLORECTAL CA SCREEN DOC REV: CPT | Performed by: ANESTHESIOLOGY

## 2020-09-23 RX ORDER — POLYETHYLENE GLYCOL 3350 17 G/17G
17 POWDER, FOR SOLUTION ORAL DAILY
COMMUNITY
End: 2020-11-18 | Stop reason: ALTCHOICE

## 2020-09-23 RX ORDER — BUDESONIDE AND FORMOTEROL FUMARATE DIHYDRATE 160; 4.5 UG/1; UG/1
2 AEROSOL RESPIRATORY (INHALATION) 2 TIMES DAILY
COMMUNITY

## 2020-09-23 RX ORDER — BUSPIRONE HYDROCHLORIDE 15 MG/1
90 TABLET ORAL 3 TIMES DAILY
COMMUNITY
End: 2020-11-20 | Stop reason: CLARIF

## 2020-09-23 RX ORDER — HYDROCODONE BITARTRATE AND ACETAMINOPHEN 7.5; 325 MG/1; MG/1
1.5 TABLET ORAL 2 TIMES DAILY
COMMUNITY

## 2020-09-23 RX ORDER — HYDROCORTISONE 25 MG/ML
LOTION TOPICAL 2 TIMES DAILY
COMMUNITY

## 2020-09-23 RX ORDER — LOSARTAN POTASSIUM 100 MG/1
100 TABLET ORAL DAILY
COMMUNITY
End: 2020-10-16

## 2020-09-23 RX ORDER — LANOLIN ALCOHOL/MO/W.PET/CERES
10 CREAM (GRAM) TOPICAL NIGHTLY PRN
COMMUNITY

## 2020-09-23 RX ORDER — DOXYCYCLINE HYCLATE 100 MG/1
100 CAPSULE ORAL 2 TIMES DAILY
COMMUNITY
End: 2020-12-01

## 2020-09-23 NOTE — PROGRESS NOTES
[facility]        [address] [facility], [address] [facility]      [phone]                  Consult Note      Patient:  Delgado Mora,  1965    Date of Service:  20     Requesting Physician:  Lurdes Delacruz DO    Reason for Consult:      Patient presents with complaints of multiple  joints pain    HISTORY OF PRESENT ILLNESS:      Ms. Delgado Mora is a 54 y.o. female [facility] of Joint pain. Patient has history of chronic bilateral knee pain for which she has undergone bilateral knee injections by Dr. Adalgisa Sanchez. That had helped her. There is plan for Visco supplement injection. The patient also has history of left hip pain - s/p left hip injection minimal relief. She also has a history of right shoulder pain diagnosed with shoulder impingement and had a right shoulder joint injection recently in on 9/15/2020. Chronic axial low back pain in the lumbar area. Denies LE radiation / weakness or numbness. She has been a patient of Dr. Ziggy Richard in the past and was on chronic opioids for many years. Has tried multiple medications in the past including tramadol Percocet gabapentin. OARRS report reviewed. Currently she is under the care of Dr. Natalio Marvin who has reduced her medications. Last prescription for Percocet noted on 2020 for a number 60 tablets and most recently there was a prescription for hydrocodone 7.5 325 number 60 tablets on 2020. She has been evaluated by Dr. Adalgisa Sanchez and had done joint injections. Apparently surgical options deferred due to high BMI and patient has been referred to Bariatric medicine for weight loss. Pain is constant and is described as aching, stabbing and throbbing. She  does not have numbness, tingling, weakness of the upper and lower extremities.     Alleviating factors include: rest.  Aggravating factors include: movement, bending, lifting. Pain causes functional limitations/ limits Adl's : Yes      Nursing notes and details of the pain history reviewed. Please see intake notes for details. Prior ortho notes reviewed. Previous treatments:   Physical Therapy : yes     Medications: - NSAID's : yes            - Membrane stabilizers : yes             - Opioids : yes, Has been on chronic opioids            - Adjuvants or Others : yes    TENS Unit: no    Interventional Pain procedures/ nerve blocks: yes, joint injections by ortho    She has not been on anticoagulation medications. She has not been on herbal supplements. She is not diabetic. H/O Smoking: denies  H/O alcohol abuse : denies  H/O Illicit drug use : denies    Employment: disability    Imaging:   MRI of the left hip on 8/21/2020: Impression    1. Moderate left and mild right hip osteoarthrosis.  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  Small debris containing left hip joint effusion. 2. No acute osseous abnormality.  No femoral head AVN. 3. Degenerative tearing of the left acetabular labrum. 4. Mild degenerative changes in the lumbar spine. 5. Fibroid uterus. XR humerus: 9/15/2020: Impression    10 screw fixation of right humerus.  No hardware complications. Xray left knee: 1/2/2020: Impression    1.  Moderate degenerative changes as above. 2.  No acute findings. Xray left hip: 1/2/2020: Impression    Mild left hip joint arthritis. Xray LS spien: 3/9/2020: Impression         1. Posterior facet joint degenerative changes, mild at L4-L5 level and    moderate at L5-S1 level. 2. Mild to moderate neural foraminal narrowing at L5-S1 level. 3. Mild right sacroiliitis.       Xray right knee: 2016:  Impression    Right knee joint effusion could be related to internal    derangement          Past Medical History:   Diagnosis Date    Anxiety     Arthritis     Asthma     Chronic headaches     Chronic pain  Depression     GERD (gastroesophageal reflux disease)     History of cardiovascular stress test 05/2015    dobutamine stress test    Hypertension     Incontinence in female     Irritable bowel syndrome     Neuromuscular disorder ([facility] [address]     Neuropathy     Prolonged emergence from general anesthesia     sometimes slow to wake up slow to go to sleep       Past Surgical History:   Procedure Laterality Date    BONE GRAFT      BRONCHOSCOPY N/A 3/27/2018    BRONCHOSCOPY DIAGNOSTIC OR CELL [address] ONLY performed by Isis Rooney MD at [address] [facility]  3/27/2018    BRONCHOSCOPY BRUSHINGS performed by Isis Rooney MD at [address] OF UTERUS      ENDOSCOPY, COLON, DIAGNOSTIC  02/09/2017    FOOT SURGERY      FRACTURE SURGERY      right humerus    UPPER GASTROINTESTINAL ENDOSCOPY         Prior to Admission medications    Medication Sig Start Date End Date Taking?  Authorizing Provider   etodolac (LODINE) 400 MG tablet Take 400 mg by mouth 2 times daily    Historical Provider, MD   etodolac (LODINE) 400 MG tablet TAKE ONE TABLET BY MOUTH TWO TIMES A DAY WITH FOOD 8/17/20   Historical Provider, MD   hydrOXYzine (VISTARIL) 25 MG capsule Take 25 mg by mouth 3 times daily as needed for Itching    Historical Provider, MD   loratadine (CLARITIN) 10 MG tablet loratadine 10 mg tablet    Historical Provider, MD   naloxone (NARCAN) 4 MG/0.1ML LIQD nasal spray Narcan 4 mg/actuation nasal spray    Historical Provider, MD   oxyCODONE-acetaminophen (PERCOCET)  MG per tablet oxycodone-acetaminophen 10 mg-325 mg tablet    Historical Provider, MD   lamoTRIgine (LAMICTAL) 25 MG tablet lamotrigine 25 mg tablet    Historical Provider, MD   metoprolol tartrate (LOPRESSOR) 50 MG tablet Take 50 mg by mouth 2 times daily    Historical Provider, MD   tiZANidine (ZANAFLEX) 4 MG tablet Take 1 tablet by mouth 3 times daily % GEL Apply 2 g topically 2 times daily.     Historical Provider, MD   pantoprazole (PROTONIX) 40 MG tablet Take 40 mg by mouth 2 times daily     Historical Provider, MD       Allergies   Allergen Reactions    Cymbalta [Duloxetine Hcl] Other (See Comments)     anger    Dexlansoprazole Nausea Only    Diovan [Valsartan] Other (See Comments)     Cough, sore throat    Ditropan [Oxybutynin] Other (See Comments)     hoarsness    Lunesta [Eszopiclone] Other (See Comments)     Bad dreams    Molds & Smuts     Norvasc [Amlodipine Besylate] Other (See Comments)     cough    Sular [Nisoldipine Er] Other (See Comments)     Cough      Tape Yvonnie Paci Tape]      rash    Dextromethorphan Polistirex Er Rash    Levaquin [Levofloxacin] Rash    Other Rash     Strawberries, wheat, peppers    Questran [Cholestyramine] Rash    Yellow Dyes (Non-Tartrazine) Rash     Yellow dye #6, (#11 & #5 if combined)       Social History     Socioeconomic History    Marital status: Single     Spouse name: Not on file    Number of children: Not on file    Years of education: Not on file    Highest education level: Not on file   Occupational History    Not on file   Social Needs    Financial resource strain: Not on file    Food insecurity     Worry: Not on file     Inability: Not on file    Transportation needs     Medical: Not on file     Non-medical: Not on file   Tobacco Use    Smoking status: Never Smoker    Smokeless tobacco: Never Used   Substance and Sexual Activity    Alcohol use: No    Drug use: No    Sexual activity: Not Currently   Lifestyle    Physical activity     Days per week: Not on file     Minutes per session: Not on file    Stress: Not on file   Relationships    Social connections     Talks on phone: Not on file     Gets together: Not on file     Attends Cheondoism service: Not on file     Active member of club or organization: Not on file     Attends meetings of clubs or organizations: Not on file non-distended and normal  Tenderness:none  Guarding:none    Cervical spine:    Inspection:normal  Palpation:tenderness paravertebral muscles, tenderness trapezium, left, right and positive. Range of motion:reduced flexion, extension, rotation bilaterally and is not painful. Spurling's: negative bilaterally    Thoracic spine:     Spine inspection:normal   Palpation:Yes tenderness over the paraspinal area, bilaterally  Range of motion:normal in flexion, extension rotation bilateral and is not painful. Lumbar spine:    Spine inspection: Normal   Palpation: Tenderness paravertebral muscles Yes bilaterally  Range of motion: Decreased, flexion Decreased, Lateral bending, extension and rotation bilaterally reduced is painful. Lumbar facet loading + bilaterally. Piriformis tenderness: negative bilaterally  SLR : negative bilaterally  Trochanteric bursa tenderness: negative bilaterally  CVA tenderness:No       Musculoskeletal:    Trigger points in trapezius: Yes bilaterally  Trigger points in rhomboids: Yes bilaterally  Trigger points in Paravertebral: Yes bilaterally      Extremities:    Tremors:None bilaterally upper and lower  Edema:none x all 4 extremities    Knee:    ROM : reduced   Joint line tenderness : yes    Left hip: ROM pain +    Right shoulder : ROM reduced and painful.     Neurological:    Sensory: Normal to light touch     Motor:   Right  5/5              Left  5/5               Right Bicep 5/5           Left Bicep 5/5              Right Triceps 5/5       Left Triceps 5/5          Right Deltoid 5/5     Left Deltoid 5/5                  Right Quadriceps 5/5          Left Quadriceps 5/5           Right Gastrocnemius 5/5    Left Gastrocnemius 5/5  Right Ant Tibialis 5/5  Left Ant Tibialis 5/5    Reflexes:    Right Brachioradialis reflex 2+  Left Brachioradialis reflex 2+  Right Biceps reflex 2+  Left Biceps reflex 2+  Right Triceps reflex 2+  Left Triceps reflex 2+  Right Quadriceps reflex 2+  Left
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
## 2020-10-13 ENCOUNTER — OFFICE VISIT (OUTPATIENT)
Dept: ORTHOPEDIC SURGERY | Age: 55
End: 2020-10-13
Payer: COMMERCIAL

## 2020-10-13 VITALS — BODY MASS INDEX: 45.73 KG/M2 | WEIGHT: 242.2 LBS | HEIGHT: 61 IN

## 2020-10-13 PROCEDURE — G8427 DOCREV CUR MEDS BY ELIG CLIN: HCPCS | Performed by: ORTHOPAEDIC SURGERY

## 2020-10-13 PROCEDURE — G8417 CALC BMI ABV UP PARAM F/U: HCPCS | Performed by: ORTHOPAEDIC SURGERY

## 2020-10-13 PROCEDURE — 1036F TOBACCO NON-USER: CPT | Performed by: ORTHOPAEDIC SURGERY

## 2020-10-13 PROCEDURE — G8484 FLU IMMUNIZE NO ADMIN: HCPCS | Performed by: ORTHOPAEDIC SURGERY

## 2020-10-13 PROCEDURE — 99214 OFFICE O/P EST MOD 30 MIN: CPT | Performed by: ORTHOPAEDIC SURGERY

## 2020-10-13 PROCEDURE — 3017F COLORECTAL CA SCREEN DOC REV: CPT | Performed by: ORTHOPAEDIC SURGERY
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
## 2020-10-16 ENCOUNTER — OFFICE VISIT (OUTPATIENT)
Dept: BARIATRICS/WEIGHT MGMT | Age: 55
End: 2020-10-16
Payer: COMMERCIAL

## 2020-10-16 VITALS
DIASTOLIC BLOOD PRESSURE: 107 MMHG | BODY MASS INDEX: 45.73 KG/M2 | HEART RATE: 84 BPM | HEIGHT: 61 IN | WEIGHT: 242.2 LBS | SYSTOLIC BLOOD PRESSURE: 174 MMHG | TEMPERATURE: 98.2 F

## 2020-10-16 PROCEDURE — 3017F COLORECTAL CA SCREEN DOC REV: CPT | Performed by: INTERNAL MEDICINE

## 2020-10-16 PROCEDURE — 1036F TOBACCO NON-USER: CPT | Performed by: INTERNAL MEDICINE

## 2020-10-16 PROCEDURE — G8417 CALC BMI ABV UP PARAM F/U: HCPCS | Performed by: INTERNAL MEDICINE

## 2020-10-16 PROCEDURE — G8427 DOCREV CUR MEDS BY ELIG CLIN: HCPCS | Performed by: INTERNAL MEDICINE

## 2020-10-16 PROCEDURE — 99201 HC NEW PT, E/M LEVEL 1: CPT

## 2020-10-16 PROCEDURE — 99205 OFFICE O/P NEW HI 60 MIN: CPT | Performed by: INTERNAL MEDICINE

## 2020-10-16 PROCEDURE — G8484 FLU IMMUNIZE NO ADMIN: HCPCS | Performed by: INTERNAL MEDICINE

## 2020-10-16 RX ORDER — LAMOTRIGINE 100 MG/1
100 TABLET ORAL DAILY
COMMUNITY
Start: 2020-09-30 | End: 2021-03-09

## 2020-10-16 RX ORDER — ARIPIPRAZOLE 20 MG/1
20 TABLET ORAL DAILY
COMMUNITY
Start: 2020-09-30 | End: 2020-11-18 | Stop reason: ALTCHOICE

## 2020-10-16 RX ORDER — LOSARTAN POTASSIUM AND HYDROCHLOROTHIAZIDE 12.5; 1 MG/1; MG/1
1 TABLET ORAL DAILY
COMMUNITY
End: 2022-06-28

## 2020-10-16 NOTE — PATIENT INSTRUCTIONS
Rules:  · Count every calorie every day  · Limit sweets to one day per month  · Limit chips/crackers/pretzels/nuts to 150 tone/day  · Limit restaurants (including fast food and food from a convenience store) to one time every two weeks  · Eliminate all sugar sweetened beverages    Requirements:  · Make sure protein intake is at least 60 grams per day (Consider using a protein shake - Nectar, Pure Protein, Premier, Slimfast advanced, Boost Max, Ensure Max, BeneProtein and [employer] (which is lactose-free) are milk-based options; Nectar, Premier Protein Clear, IsoPure Protein Drink, and Protein 2 O are water-based options; Quest (or Cosco, which is cheaper and is ordered on [address] [facility] [address]) and the CrossMedia 1 protein bars can also be used, but have less protein in them )  · Make sure that fiber intake is at least 20 grams per day. Take 4 tablespoons of wheat dextrin (Benefiber or equivalent) or 12 tablespoons of original plain Fiber One every day. Work up to this amount slowly by starting with only one-fourth of the target amount and adding another one-fourth every one or two weeks  · Take one multivitamin every day    Goals:  · Limit calorie intake to 1100 calories/day  · Avoid eating 2 hours within bedtime. Tips:  · Do not eat outside of the dining room or the kitchen  · Do not eat while watching TV, videos, working on the computer or using a smart phone  · Do not eat food out of a multi-serving bag or container. Return to see Dr. Ben Villalta in 4-6 weeks    See the dietician for assistance with food choices and meal planning  Prior to seeing him, keep a careful food log for two typical days (one work day and one weekend day). Log all foods and their amounts (volume or weight), carb/fat/protein content, and calore content for the foods, meals and day. Make two lists: one of the top 5 foods important for you to include in your weekly diet routine and one of the top 5 foods commonly eaten by people that you dislike.

## 2020-10-16 NOTE — PROGRESS NOTES
CC -                     Ortho (Dr. Denese Lesches) - L MONIK   L hip pain, Obesity    Background -   First visit: 10/16/20    L Hip Pain  Began 10/2020 due to a labrum tear  Severity is 8/10  Sees Dr. 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 Panfilo Shah), [employer], OTC meds    Initial Diet:    Number of meals per day - 3    Number of snacks per day - graze    Meal volume - 12\" plate, usually seconds    Fast food/convenience store - 2x/week (for 2-3 months in the past year, o/w none)    Restaurants (not fast food) - 0x/week   Sweets - 7d/week   Chips - 7d/week   Crackers/pretzels - 3d/week   Nuts - 2d/week   Peanut Butter - 4d/week   Popcorn - 2d/week   Dried fruit - 0-1d/week   Whole fruit - 3-4d/week (1-6 servings)   Breakfast cereal - 0d/week   Granola/Protein/Energy bar - 0d/week   Sugar sweetened beverages - 16 oz Gatorade/day (125 tone)   Protein - No supplements   Fiber - No supplements     Exercise:    Gym membership - None    Walking - None    Running - None    Resistance - 60 min bands/1d/wk    Aerobic class - 30-45 min/d, 2d/wk      ______________________    [facility] Prob: L hip pain, R shoulder pain, Obesity, Depression, GERD, Neuropathy, Asthma  Medications: Losartan-HCTZ, Metoprolol, Lamictal, Aripiprazole, Buspirone, Vortloxetine, Protonix, Singulair, Gabapentin, Etodolac, Tizanidine    ROS -  Card - no CP  GI - no N/V/D    PE -  Gen : BP (!) 174/107 (Site: Left Upper Arm, Position: Sitting, Cuff Size: Large Adult)   Pulse 84   Temp 98.2 °F (36.8 °C) (Temporal)   Ht 5' 0.5\" (1.537 m)   Wt 242 lb 3.2 oz (109.9 kg)   BMI 46.52 kg/m²    WN, WD, NAD  Lung: Nml resp effort  Psych: Normal mood   Full affect  Neuro: Moves all ext well  ______________________      HPI & A/P -   Problem 1  - L Hip Pain   HPI   - See above background for description      Preparing for MONIK. Must reduce wt to 210 lbs  Assessment  - Uncontrolled  Plan   - Proceed with wt reduction per the plan below    Problem 2  - Obesity   HPI   - See above Background for description    Weight  Date    242.2 lbs 10/16/20    DEN: 1650 tone/day    Would like to reduce her weight as rapidly as possible    However, financial constraints (food stamps and food bank) prevent her from a VLCD    Will therefore proceed with a counting-based regimen with rules of elimination and have her meet with the dietician  Assessment  - Uncontrolled   Plan   -   Patient Instructions   Rules:  · Count every calorie every day  · Limit sweets to one day per month  · Limit chips/crackers/pretzels/nuts to 150 tone/day  · Limit restaurants (including fast food and food from a convenience store) to one time every two weeks  · Eliminate all sugar sweetened beverages    Requirements:  · Make sure protein intake is at least 60 grams per day (Consider using a protein shake - Nectar, Pure Protein, Premier, Slimfast advanced, Boost Max, Ensure Max, BeneProtein and [employer] (which is lactose-free) are milk-based options; Nectar, Premier Protein Clear, IsoPure Protein Drink, and Protein 2 O are water-based options; Quest (or Cosco, which is cheaper and is ordered on [address] [facility] [address]) and the Oh [employer] 1 protein bars can also be used, but have less protein in them )  · Make sure that fiber intake is at least 20 grams per day. Take 4 tablespoons of wheat dextrin (Benefiber or equivalent) or 12 tablespoons of original plain Fiber One every day.  Work up to this amount slowly by starting with only one-fourth of the target amount and adding another one-fourth every one or two weeks  · Take one multivitamin every day    Goals:  · Limit calorie intake to 1100 calories/day  · Avoid eating 2 hours within bedtime. Tips:  · Do not eat outside of the dining room or the kitchen  · Do not eat while watching TV, videos, working on the computer or using a smart phone  · Do not eat food out of a multi-serving bag or container. Return to see me in 4-6 weeks    See the dietician for assistance with food choices and meal planning  Prior to seeing him, keep a careful food log for two typical days (one work day and one weekend day). Log all foods and their amounts (volume or weight), carb/fat/protein content, and calore content for the foods, meals and day. Make two lists: one of the top 5 foods important for you to include in your weekly diet routine and one of the top 5 foods commonly eaten by people that you dislike. I spent 60 minutes in a face to face encounter with Gema Fontenot today.    >40 minutes of this was in education and counseling regarding the six types of weight loss interventions, expected rates of weight loss, rule-based vs principle-based paradigms, protein and fiber requirements and supplements and the weight impact of her trouble foods    Terrall Dakin, MD [facility]  10/16/20

## 2020-10-23 ENCOUNTER — HOSPITAL ENCOUNTER (OUTPATIENT)
Dept: MRI IMAGING | Age: 55
Discharge: HOME | End: 2020-10-25
Payer: COMMERCIAL

## 2020-10-23 PROCEDURE — 73221 MRI JOINT UPR EXTREM W/O DYE: CPT

## 2020-11-03 ENCOUNTER — TELEPHONE (OUTPATIENT)
Dept: ADMINISTRATIVE | Age: 55
End: 2020-11-03

## 2020-11-03 PROBLEM — F32.A DEPRESSION: Status: RESOLVED | Noted: 2020-11-03 | Resolved: 2020-11-03

## 2020-11-03 PROBLEM — I10 HYPERTENSION: Status: RESOLVED | Noted: 2020-11-03 | Resolved: 2020-11-03

## 2020-11-11 ENCOUNTER — VIRTUAL VISIT (OUTPATIENT)
Dept: BARIATRICS/WEIGHT MGMT | Age: 55
End: 2020-11-11
Payer: COMMERCIAL

## 2020-11-11 PROCEDURE — 99999 PR OFFICE/OUTPT VISIT,PROCEDURE ONLY: CPT

## 2020-11-11 NOTE — PROGRESS NOTES
PHONE APPOINTMENT DUE TO GHZWY-91 public health emergency. All printed materials mailed to pt. Provided initial weight loss diet counseling to pt. Pt weighed 240 lbs, a self-reported weight. This indicates 2 lbs of weight loss since beginning her plan on 10/16/20 with Dr Matt Bae. Plan is as follows, per Dr Matt Bae:    Rules:  · Count every calorie every day  · Limit sweets to one day per month  · Limit chips/crackers/pretzels/nuts to 150 tone/day  · Limit restaurants (including fast food and food from a convenience store) to one time every two weeks  · Eliminate all sugar sweetened beverages     Requirements:  · Make sure protein intake is at least 60 grams per day (Consider using a protein shake - Nectar, Pure Protein, Premier, Slimfast advanced, Boost Max, Ensure Max, BeneProtein and [employer] (which is lactose-free) are milk-based options; Nectar, Premier Protein Clear, IsoPure Protein Drink, and Protein 2 O are water-based options; Quest (or Cosco, which is cheaper and is ordered on [employer]) and the [employer] protein bars can also be used, but have less protein in them )  · Make sure that fiber intake is at least 20 grams per day. Take 4 tablespoons of wheat dextrin (Benefiber or equivalent) or 12 tablespoons of original plain Fiber One every day. Work up to this amount slowly by starting with only one-fourth of the target amount and adding another one-fourth every one or two weeks  · Take one multivitamin every day     Goals:  · Limit calorie intake to 1100 calories/day  · Avoid eating 2 hours within bedtime. Pt has started her plan. She is counting, however, she mentioned that she is doing it \"mentally\", and not writing it down. Instructed pt to keep written records and to get a true calorie amount every day. This will help pt to keep calories controlled and to lose weight consistently. Also suggested using apps such as BioStable or LinkStorm to record her intake.   Pt is not yet limiting sweets

## 2020-11-18 ENCOUNTER — OFFICE VISIT (OUTPATIENT)
Dept: PAIN MANAGEMENT | Age: 55
End: 2020-11-18
Payer: COMMERCIAL

## 2020-11-18 ENCOUNTER — PREP FOR PROCEDURE (OUTPATIENT)
Dept: PAIN MANAGEMENT | Age: 55
End: 2020-11-18

## 2020-11-18 VITALS
HEART RATE: 79 BPM | OXYGEN SATURATION: 95 % | WEIGHT: 245.5 LBS | SYSTOLIC BLOOD PRESSURE: 128 MMHG | DIASTOLIC BLOOD PRESSURE: 88 MMHG | BODY MASS INDEX: 46.35 KG/M2 | HEIGHT: 61 IN | RESPIRATION RATE: 16 BRPM | TEMPERATURE: 97.3 F

## 2020-11-18 PROBLEM — M51.369 DDD (DEGENERATIVE DISC DISEASE), LUMBAR: Status: ACTIVE | Noted: 2020-11-18

## 2020-11-18 PROBLEM — M47.817 LUMBOSACRAL SPONDYLOSIS WITHOUT MYELOPATHY: Status: ACTIVE | Noted: 2020-11-18

## 2020-11-18 PROBLEM — M51.36 DDD (DEGENERATIVE DISC DISEASE), LUMBAR: Status: ACTIVE | Noted: 2020-11-18

## 2020-11-18 PROBLEM — M54.2 CERVICALGIA: Status: ACTIVE | Noted: 2020-11-18

## 2020-11-18 PROCEDURE — G8484 FLU IMMUNIZE NO ADMIN: HCPCS | Performed by: ANESTHESIOLOGY

## 2020-11-18 PROCEDURE — 3017F COLORECTAL CA SCREEN DOC REV: CPT | Performed by: ANESTHESIOLOGY

## 2020-11-18 PROCEDURE — G8417 CALC BMI ABV UP PARAM F/U: HCPCS | Performed by: ANESTHESIOLOGY

## 2020-11-18 PROCEDURE — 99213 OFFICE O/P EST LOW 20 MIN: CPT | Performed by: ANESTHESIOLOGY

## 2020-11-18 PROCEDURE — 1036F TOBACCO NON-USER: CPT | Performed by: ANESTHESIOLOGY

## 2020-11-18 PROCEDURE — G8427 DOCREV CUR MEDS BY ELIG CLIN: HCPCS | Performed by: ANESTHESIOLOGY

## 2020-11-18 PROCEDURE — 99214 OFFICE O/P EST MOD 30 MIN: CPT | Performed by: ANESTHESIOLOGY

## 2020-11-18 RX ORDER — CARIPRAZINE 1.5 MG/1
3 CAPSULE, GELATIN COATED ORAL DAILY
COMMUNITY
Start: 2020-11-10

## 2020-11-18 NOTE — PROGRESS NOTES
Do you currently have any of the following:    Fever: No  Headache:  No  Cough: No  Shortness of breath: yes  Exposed to anyone with these symptoms: No                                                                                                                Rekha Herndon presents to the [facility] on 11/18/2020. Val Gonsalez is complaining of pain right arm left hip lower back and neck. The pain is constant. The pain is described as sharp. Pain is rated on her best day at a 5, on her worst day at a 8, and on average at a 7 on the VAS scale. She took her last dose of Norco, lodine and gabapentin  this last night . Val Gonsalez does not have issues with constipation. Any procedures since your last visit: No, with  % relief. She is not on NSAIDS and  is not on anticoagulation medications to include none and is managed by . Pacemaker or defibrilator: No Physician managing device is . Medication Contract and Consent for Opioid Use Documents Filed      No documents found                   /88   Pulse 79   Temp 97.3 °F (36.3 °C) (Infrared)   Resp 16   Ht 5' 0.5\" (1.537 m)   Wt 245 lb 8 oz (111.4 kg)   SpO2 95%   BMI 47.16 kg/m²      No LMP recorded.  Patient is postmenopausal.

## 2020-11-18 NOTE — PROGRESS NOTES
Prior to Admission medications    Medication Sig Start Date End Date Taking? Authorizing Provider   lamoTRIgine (LAMICTAL) 100 MG tablet Take 100 mg by mouth daily  9/30/20  Yes Historical Provider, MD   losartan-hydroCHLOROthiazide (HYZAAR) 100-12.5 MG per tablet Take 1 tablet by mouth daily   Yes Historical Provider, MD   melatonin 3 MG TABS tablet Take 10 mg by mouth nightly as needed    Yes Historical Provider, MD   HYDROcodone-acetaminophen (NORCO) 7.5-325 MG per tablet Take 1 tablet by mouth every 6 hours as needed for Pain. Yes Historical Provider, MD   busPIRone (BUSPAR) 15 MG tablet Take 90 mg by mouth 3 times daily    Yes Historical Provider, MD   budesonide-formoterol (SYMBICORT) 160-4.5 MCG/ACT AERO Inhale 2 puffs into the lungs 2 times daily   Yes Historical Provider, MD   ciclopirox (LOPROX) 0.77 % cream Apply topically 2 times daily Apply topically 2 times daily. Yes Historical Provider, MD   hydrocortisone (HYTONE) 2.5 % lotion Apply topically 2 times daily Apply topically 2 times daily.    Yes Historical Provider, MD   etodolac (LODINE) 400 MG tablet TAKE ONE TABLET BY MOUTH TWO TIMES A DAY WITH FOOD 8/17/20  Yes Historical Provider, MD   hydrOXYzine (VISTARIL) 25 MG capsule Take 25 mg by mouth 3 times daily as needed for Itching   Yes Historical Provider, MD   loratadine (CLARITIN) 10 MG tablet loratadine 10 mg tablet   Yes Historical Provider, MD   metoprolol tartrate (LOPRESSOR) 50 MG tablet Take 50 mg by mouth 2 times daily   Yes Historical Provider, MD   tiZANidine (ZANAFLEX) 4 MG tablet Take 1 tablet by mouth 3 times daily  Patient taking differently: Take 4 mg by mouth 2 times daily  10/18/18  Yes Hallie Christensen,    VORTIoxetine HBr (TRINTELLIX) 20 MG TABS tablet Take 10 mg by mouth daily    Yes Historical Provider, MD   albuterol sulfate  (90 BASE) MCG/ACT inhaler Inhale 2 puffs into the lungs every 6 hours as needed for Wheezing   Yes Historical Provider, MD montelukast (SINGULAIR) 10 MG tablet Take 10 mg by mouth nightly. Yes Historical Provider, MD   gabapentin (NEURONTIN) 600 MG tablet Take 600 mg by mouth 2 times daily.     Yes Historical Provider, MD   pantoprazole (PROTONIX) 40 MG tablet Take 40 mg by mouth daily    Yes Historical Provider, MD   VRAYLAR 1.5 MG capsule  11/10/20   Historical Provider, MD   doxycycline hyclate (VIBRAMYCIN) 100 MG capsule Take 100 mg by mouth 2 times daily    Historical Provider, MD       Allergies   Allergen Reactions    Cymbalta [Duloxetine Hcl] Other (See Comments)     anger    Dexlansoprazole Nausea Only    Diovan [Valsartan] Other (See Comments)     Cough, sore throat    Ditropan [Oxybutynin] Other (See Comments)     hoarsness    Lunesta [Eszopiclone] Other (See Comments)     Bad dreams    Molds & Smuts     Norvasc [Amlodipine Besylate] Other (See Comments)     cough    Sular [Nisoldipine Er] Other (See Comments)     Cough      Tape Nena Schilder Tape]      rash    Dextromethorphan Polistirex Er Rash    Levaquin [Levofloxacin] Rash    Other Rash     Strawberries, wheat, peppers    Questran [Cholestyramine] Rash    Yellow Dyes (Non-Tartrazine) Rash     Yellow dye #6, (#11 & #5 if combined)       Social History     Socioeconomic History    Marital status: Single     Spouse name: Not on file    Number of children: Not on file    Years of education: Not on file    Highest education level: Not on file   Occupational History    Not on file   Social Needs    Financial resource strain: Not on file    Food insecurity     Worry: Not on file     Inability: Not on file    Transportation needs     Medical: Not on file     Non-medical: Not on file   Tobacco Use    Smoking status: Never Smoker    Smokeless tobacco: Never Used   Substance and Sexual Activity    Alcohol use: No    Drug use: No    Sexual activity: Not Currently   Lifestyle    Physical activity     Days per week: Not on file     Minutes per session: Not on file    Stress: Not on file   Relationships    Social connections     Talks on phone: Not on file     Gets together: Not on file     Attends Zoroastrian service: Not on file     Active member of club or organization: Not on file     Attends meetings of clubs or organizations: Not on file     Relationship status: Not on file    Intimate partner violence     Fear of current or ex partner: Not on file     Emotionally abused: Not on file     Physically abused: Not on file     Forced sexual activity: Not on file   Other Topics Concern    Not on file   Social History Narrative    Not on file       Family History   Problem Relation Age of Onset    Other Mother     High Blood Pressure Mother     Cancer Father     High Blood Pressure Brother     High Blood Pressure Maternal Grandfather        REVIEW OF SYSTEMS:     Mine Snowball denies fever/chills, chest pain, shortness of breath, new bowel or bladder complaints. All other review of systems was negative. PHYSICAL EXAMINATION:      /88   Pulse 79   Temp 97.3 °F (36.3 °C) (Infrared)   Resp 16   Ht 5' 0.5\" (1.537 m)   Wt 245 lb 8 oz (111.4 kg)   SpO2 95%   BMI 47.16 kg/m²      General:       General appearance:  Pleasant and well-hydrated, in no distress and A & O x 3  Build:Obese  Function: Rises from seated position easily and Moves about room without difficulty   Component of over reaction noted.     HEENT:     Head:normocephalic, atraumatic  Pupils:regular, round, equal  Sclera: icterus absent     Lungs:     Breathing:normal breathing pattern      CVS:     RRR     Abdomen:     Shape:obese, non-distended and normal  Tenderness:none  Guarding:none     Cervical spine:     Inspection:normal  Palpation:tenderness paravertebral muscles, tenderness trapezium, left, right and positive. Range of motion:reduced flexion, extension, rotation bilaterally and is not painful.   Spurling's: negative bilaterally     Thoracic spine:                Spine inspection:normal   Palpation:Yes tenderness over the paraspinal area, bilaterally  Range of motion:normal in flexion, extension rotation bilateral and is not painful.     Lumbar spine:     Spine inspection: Normal   Palpation: Tenderness paravertebral muscles Yes bilaterally  Range of motion: Decreased, flexion Decreased, Lateral bending, extension and rotation bilaterally reduced is painful. Lumbar facet loading + bilaterally. Piriformis tenderness: negative bilaterally  SLR : negative bilaterally  Trochanteric bursa tenderness: negative bilaterally  CVA tenderness:No       Musculoskeletal:     Trigger points in trapezius: Yes bilaterally  Trigger points in rhomboids: Yes bilaterally  Trigger points in Paravertebral: Yes bilaterally      Extremities:     Tremors:None bilaterally upper and lower  Edema:none x all 4 extremities    Shoulder ROM - reduced     Knee:     ROM : reduced   Joint line tenderness : yes     Left hip: ROM pain +     Right shoulder : ROM reduced and painful.     Neurological:     Sensory: Normal to light touch      Motor:   Right  5/5              Left  5/5               Right Bicep 5/5           Left Bicep 5/5              Right Triceps 5/5       Left Triceps 5/5          Right Deltoid 5/5     Left Deltoid 5/5                  Right Quadriceps 5/5          Left Quadriceps 5/5           Right Gastrocnemius 5/5    Left Gastrocnemius 5/5  Right Ant Tibialis 5/5  Left Ant Tibialis 5/5     Reflexes:    Right Brachioradialis reflex 2+  Left Brachioradialis reflex 2+  Right Biceps reflex 2+  Left Biceps reflex 2+  Right Triceps reflex 2+  Left Triceps reflex 2+  Right Quadriceps reflex 2+  Left Quadriceps reflex 2+  Right Achilles reflex 2+  Left Achilles reflex 2+     Gait:no assistive device     Dermatology:     Skin:no rashes or lesions noted    Assessment/Plan:   Diagnosis Orders   1. Lumbosacral spondylosis without myelopathy     2. DDD (degenerative disc disease), lumbar     3. Cervicalgia     4. Primary osteoarthritis of both knees     5. Hip pain     6. Chronic right shoulder pain     7. Obesity, unspecified classification, unspecified obesity type, unspecified whether serious comorbidity present     8. Psychological factors affecting medical condition     9. Chronic myofascial pain     10. Chronic, continuous use of opioids            54 y.o.  female with H/o chronic pain for years. She has multiple complaints including bilateral knee pain, left hip pain and right shoulder pain. She also has chronic axial low back pain and neck pain.     She has been evaluated by orthopedics and had bilateral knee injections left and hip injection and right shoulder injection. Recent Shoulder MRI reviewed - and she has a follow up appointment with Dr. Yaron Culver. Also pending UE EMG.     She has been under the care of Dr. Gaby Velasco and has been on chronic opioids for years. Recently she has switched care to Dr. Oma Jackson and he is on hydrocodone.     She is morbidly obese and is trying to loose weight.     Continue Physical therapy/aquatic therapy.     Using TENS unit regularly on a daily basis for > 4 weeks and it helps while it is on for the neck pain and joint pain. Recommend TENS unit for long term use. She wants to address the low back pain. Has features fo lumbar facet pain. Failed conservative treatment. Schedule for diagnostic lumbar facet MBNB over the bilateral L3, L4 MB & L5DR. RBA discussed and patient agreed to proceed. If short term pain relief , will do RFA.     Patient is getting pain medications from Dr. Domo Chandler and she will continue care with them.   She understands that we will not be taking over pain medications at this point of time.     Counseling weight loss and HEP        Urine screen today: no     Counseling :     Patient encouraged to stay active and to watch/lose weight     Encouraged to continue Regular home exercise program as tolerated - stretching / strengthening.     Treatment plan discussed with the patient including medication and procedure side effects.     Controlled Substances Monitoring:      [place] reviewed- 11/18/20       Lasha Escalera MD    CC:  Clari Morrissey DO

## 2020-11-20 ENCOUNTER — VIRTUAL VISIT (OUTPATIENT)
Dept: BARIATRICS/WEIGHT MGMT | Age: 55
End: 2020-11-20
Payer: COMMERCIAL

## 2020-11-20 PROCEDURE — 99443 PR PHYS/QHP TELEPHONE EVALUATION 21-30 MIN: CPT | Performed by: INTERNAL MEDICINE

## 2020-11-20 RX ORDER — BUSPIRONE HYDROCHLORIDE 15 MG/1
15 TABLET ORAL 2 TIMES DAILY
COMMUNITY

## 2020-11-20 NOTE — PATIENT INSTRUCTIONS
Rules:  · Count every calorie every day  · Limit sweets to one day per month  · Limit chips/crackers/pretzels/nuts to 150 tone/day  · Limit restaurants (including fast food and food from a convenience store) to one time every two weeks  · Eliminate all sugar sweetened beverages    Requirements:  · Make sure protein intake is at least 60 grams per day (Consider using a protein shake - Nectar, Pure Protein, Premier, Slimfast advanced, Boost Max, Ensure Max, BeneProtein and [employer] (which is lactose-free) are milk-based options; Nectar, Premier Protein Clear, IsoPure Protein Drink, and Protein 2 O are water-based options; Quest (or Cosco, which is cheaper and is ordered on [address] [facility] [address]) and the [employer] 1 protein bars can also be used, but have less protein in them )  · Make sure that fiber intake is at least 20 grams per day. Take 4 tablespoons of wheat dextrin (Benefiber or equivalent) or 12 tablespoons of original plain Fiber One every day. Work up to this amount slowly by starting with only one-fourth of the target amount and adding another one-fourth every one or two weeks  · Take one multivitamin every day    Goals:  · Limit calorie intake to 1300 calories/day  · Avoid eating 2 hours within bedtime. Tips:  · Do not eat outside of the dining room or the kitchen  · Do not eat while watching TV, videos, working on the computer or using a smart phone  · Do not eat food out of a multi-serving bag or container.     Return to see Dr. Clint Chisholm in 4 weeks

## 2020-11-20 NOTE — PROGRESS NOTES
Rekha Herndon is a 54 y.o. female evaluated via telephone on 11/20/2020. Consent:  She and/or health care decision maker is aware that that she may receive a bill for this telephone service, depending on her insurance coverage, and has provided verbal consent to proceed: Yes    Documentation:  I communicated with the patient and/or health care decision maker about Hip pain and Obesity.    Details of this discussion including any medical advice provided are as follows:      CC -                     Ortho (Dr. Nolan Jones) - L MONIK   L hip pain, Obesity    Background -   Last visit: 10/16/20  First visit: 10/16/20    L Hip Pain  Began 10/2020 due to a labrum tear  Severity is 8/10  Sees Dr. Nolan Jones and is preparing for a L MONIK and needs to reduce her weight to 210 lbs    Obesity   Began in childhood  Initial BMI 46.5, Wt 242.2  HS Grad wt 180 lbs   Lowest wt 180 lbs   Highest wt 260 lbs  Pattern of wt gain: grad with rapid increase one year ago  Wt change past yr: +40 then -20  Most wt lost: 20 lbs (calorie counting + protein shake meal replacements)  Other diets attempted: Karla, Jada (Dr. Malina Kirkpatrick), [employer], OTC meds    Initial Diet:    Number of meals per day - 3    Number of snacks per day - graze    Meal volume - 12\" plate, usually seconds    Fast food/convenience store - 2x/week (for 2-3 months in the past year, o/w none)    Restaurants (not fast food) - 0x/week   Sweets - 7d/week   Chips - 7d/week   Crackers/pretzels - 3d/week   Nuts - 2d/week   Peanut Butter - 4d/week   Popcorn - 2d/week   Dried fruit - 0-1d/week   Whole fruit - 3-4d/week (1-6 servings)   Breakfast cereal - 0d/week   Granola/Protein/Energy bar - 0d/week   Sugar sweetened beverages - 16 oz Gatorade/day (125 tone)   Protein - No supplements   Fiber - No supplements     Exercise:    Gym membership - None    Walking - None    Running - None    Resistance - 60 min bands/1d/wk    Aerobic class - 30-45 min/d, 2d/wk      ______________________    [facility] - Med Prob: L hip pain, R shoulder pain, Obesity, Depression, GERD, Neuropathy, Asthma  Current Outpatient Medications   Medication Sig Dispense Refill    busPIRone (BUSPAR) 15 MG tablet Take 15 mg by mouth 3 times daily      VORTIoxetine (TRINTELLIX) 10 MG TABS tablet Take 10 mg by mouth daily      VRAYLAR 1.5 MG capsule       lamoTRIgine (LAMICTAL) 100 MG tablet Take 100 mg by mouth daily       losartan-hydroCHLOROthiazide (HYZAAR) 100-12.5 MG per tablet Take 1 tablet by mouth daily      melatonin 3 MG TABS tablet Take 10 mg by mouth nightly as needed       HYDROcodone-acetaminophen (NORCO) 7.5-325 MG per tablet Take 1 tablet by mouth every 6 hours as needed for Pain.  budesonide-formoterol (SYMBICORT) 160-4.5 MCG/ACT AERO Inhale 2 puffs into the lungs 2 times daily      ciclopirox (LOPROX) 0.77 % cream Apply topically 2 times daily Apply topically 2 times daily.  hydrocortisone (HYTONE) 2.5 % lotion Apply topically 2 times daily Apply topically 2 times daily.  doxycycline hyclate (VIBRAMYCIN) 100 MG capsule Take 100 mg by mouth 2 times daily      etodolac (LODINE) 400 MG tablet TAKE ONE TABLET BY MOUTH TWO TIMES A DAY WITH FOOD      hydrOXYzine (VISTARIL) 25 MG capsule Take 25 mg by mouth 3 times daily as needed for Itching      loratadine (CLARITIN) 10 MG tablet loratadine 10 mg tablet      metoprolol tartrate (LOPRESSOR) 50 MG tablet Take 50 mg by mouth 2 times daily      tiZANidine (ZANAFLEX) 4 MG tablet Take 1 tablet by mouth 3 times daily (Patient taking differently: Take 4 mg by mouth 2 times daily ) 90 tablet 1    albuterol sulfate  (90 BASE) MCG/ACT inhaler Inhale 2 puffs into the lungs every 6 hours as needed for Wheezing      montelukast (SINGULAIR) 10 MG tablet Take 10 mg by mouth nightly.  gabapentin (NEURONTIN) 600 MG tablet Take 600 mg by mouth 2 times daily.        pantoprazole (PROTONIX) 40 MG tablet Take 40 mg by mouth daily        No current facility-administered medications for this visit. ROS -  Card - no CP  GI - no N/V/D    PE -  Gen : BP (!) 174/107 (Site: Left Upper Arm, Position: Sitting, Cuff Size: Large Adult)   Pulse 84   Temp 98.2 °F (36.8 °C) (Temporal)   Ht 5' 0.5\" (1.537 m)   Wt 242 lb 3.2 oz (109.9 kg)   BMI 46.52 kg/m²    WN, WD, NAD  Lung: Nml resp effort  Psych: Normal mood   Full affect  Neuro: Moves all ext well  ______________________      HPI & A/P -   Problem 1  - L Hip Pain   HPI   - See above background for description      5-8/10 in severity      Preparing for MONIK.  Must reduce wt to 210 lbs  Assessment  - Uncontrolled  Plan   - Proceed with wt reduction per the plan below    Problem 2  - Obesity   HPI   - See above Background for description    Weight  Date    242.2 lbs 10/16/20    240.8 lbs 11/20/20 (per her PCP scales 11/19/20)    Total wt loss to date: 1.4 lbs    DEN: 1650 tone/day    Would like to reduce her weight as rapidly as possible    However, financial constraints (food stamps and food bank) prevent her from a VLCD    Therefore chose a counting-based regimen with rules of elimination and have her meet with the dietician    Counting calories every day; limits to <1300 tone/day    Drinking a protein shake periodically; however meeting her protein target without it    Taking a fiber supplement (Fiber WellFit, 5-10 grams/day)    Not drinking any SSBs    Eating sweets 3x/month  Assessment  - Slight improvement  Plan   -   Patient Instructions   Rules:  · Count every calorie every day  · Limit sweets to one day per month  · Limit chips/crackers/pretzels/nuts to 150 tone/day  · Limit restaurants (including fast food and food from a convenience store) to one time every two weeks  · Eliminate all sugar sweetened beverages    Requirements:  · Make sure protein intake is at least 60 grams per day (Consider using a protein shake - Nectar, Pure Protein, Premier, Slimfast advanced, Boost Max, Ensure Max, BeneProtein and GNC lean (which is lactose-free) are milk-based options; Nectar, Premier Protein Clear, IsoPure Protein Drink, and Protein 2 O are water-based options; Quest (or Cosco, which is cheaper and is ordered on [address] [facility] [address] [facility] [address]) and the Oh Yeah 1 protein bars can also be used, but have less protein in them )  · Make sure that fiber intake is at least 20 grams per day. Take 4 tablespoons of wheat dextrin (Benefiber or equivalent) or 12 tablespoons of original plain Fiber One every day. Work up to this amount slowly by starting with only one-fourth of the target amount and adding another one-fourth every one or two weeks  · Take one multivitamin every day    Goals:  · Limit calorie intake to 1300 calories/day  · Avoid eating 2 hours within bedtime. Tips:  · Do not eat outside of the dining room or the kitchen  · Do not eat while watching TV, videos, working on the computer or using a smart phone  · Do not eat food out of a multi-serving bag or container. Return to see me in 4 weeks      I affirm this is a Patient Initiated Episode with a Patient who has not had a related appointment within my department in the past 7 days or scheduled within the next 24 hours.     Patient identification was verified at the start of the visit: Yes     Start time: 9:14 am  End time:  9:40 am    Total Time: minutes: 11-20 minutes (26 min)    Note: not billable if this call serves to triage the patient into an appointment for the relevant concern      Angela Ramos MD  Endocrinology/Obesity  11/20/20

## 2020-11-25 ENCOUNTER — OFFICE VISIT (OUTPATIENT)
Dept: PHYSICAL MEDICINE AND REHAB | Age: 55
End: 2020-11-25
Payer: COMMERCIAL

## 2020-11-25 VITALS — WEIGHT: 238 LBS | BODY MASS INDEX: 44.93 KG/M2 | HEIGHT: 61 IN

## 2020-11-25 PROCEDURE — 95909 NRV CNDJ TST 5-6 STUDIES: CPT | Performed by: PHYSICAL MEDICINE & REHABILITATION

## 2020-11-25 PROCEDURE — 95886 MUSC TEST DONE W/N TEST COMP: CPT | Performed by: PHYSICAL MEDICINE & REHABILITATION

## 2020-11-25 NOTE — PROGRESS NOTES
[address] [facility]  Electrodiagnostic Laboratory  *Accredited by the [facility] with exemplary status  [address] [facility] [place]  Phone: [phone]  Fax: [phone]    Referring Provider: Adam Duff DO  Primary Care Physician: Ivan Aburto DO  Patient Name: Tee Gregorio  Patient YOB: 1965  Gender: female  BMI: Body mass index is 44.97 kg/m². Height 5' 1\" (1.549 m), weight 238 lb (108 kg). 12/2/2020    Description of clinical problem:   Chief Complaint   Patient presents with    Extremity Pain     more upper arm pain. pain described as achy. pain rated a 4. more pain at night. symptoms for about 6 months after shoulder tear.  Numbness     in the forearm and down in to the fingers. mostly the pinky, and thumb    Extremity Weakness     weakened  strength. Sensory NCS      Nerve / Sites Rec. Site Peak Lat PP Amp Segments Distance Velocity Temp. ms µV  cm m/s °C   R Median - Digit II (Antidromic)      Palm Dig II 2.08 122.9 Palm - Dig II 7 48 32.4      Wrist Dig II 3.70 123.1 Wrist - Dig II 14 50 32.4   R Ulnar - Digit V (Antidromic)      Wrist Dig V 3.39 39.4 Wrist - Dig V 14 54 32.4   R Radial - Anatomical snuff box (Forearm)      Forearm Wrist 2.34 11.3 Forearm - Wrist 10 49 32.6       Combined Sensory Index      Nerve / Sites Rec. Site Peak Lat NP Amp PP Amp Segments Dist. Peak Diff Temp.      ms µV µV  cm ms °C   R Median - CSI      Median Thumb 3.18 15.7 25.5 Median - Radial 10 0.00 32.3      Radial Thumb 3.18 11.2 17.6 Median - Ulnar 14 0.52 32.3      Median Ring 3.80 11.4 23.1 Median palm - Ulnar palm 8 0.47 32.2      Ulnar Ring 3.28 46.5 52.2          Median palm Wrist 2.34 52.6 59.6          Ulnar palm Wrist 1.88 32.6 78.4          CSI     CSI  0.99*        Motor NCS      Nerve / Sites Muscle Onset Amplitude Segments Distance Velocity Temp.     ms mV  cm m/s °C   R Median - APB      Palm APB 1.61 12.4 Palm - APB   32.4      Wrist APB 3.91 8.2 Wrist - Palm 8 35* 32.4      Elbow APB 6.98 7.3 Elbow - Wrist 17 55 32.6   R Ulnar - ADM      Wrist ADM 2.71 5.7 Wrist - ADM 8  32.6      B. Elbow ADM 5.47 5.4 B. Elbow - Wrist 17 62 32.6      A. Elbow ADM 6.61 4.2 A. Elbow - B. Elbow 10 87 32.7   R Ulnar - FDI      Wrist FDI 3.85 8.8 Wrist - FDI   32.6      B. Elbow FDI 6.77 7.3 B. Elbow - Wrist 17 58 32.4      A. Elbow FDI 8.28 7.8 A. Elbow - B. Elbow 10 66 32.6       F  Wave      Nerve Fmin % F    ms %   R Median - APB 26.41 100   R Ulnar - ADM 26.51 50       EMG      EMG Summary Table     Spontaneous MUAP Recruitment   Muscle Nerve Roots IA Fib PSW Fasc Amp Dur. PPP Pattern   R. Biceps brachii Musculocutaneous C5-C6 N None None None N N N N   R. Triceps brachii Radial C6-C8 N None None None N N N N   R. Pronator teres Median C6-C7 N None None None N N N N   R. First dorsal interosseous Ulnar C8-T1 N None None None N N N N   R. Abductor pollicis brevis Median G6-M7 N None None None N N N N       Study Limitations:  obesity    Summary of Findings:   Nerve conduction studies:   · The following nerve conduction studies were abnormal:   · Right median combined sensory index was abnormal.   · Right median motor conduction velocity across the wrist is focally slow. · All other nerve conduction studies listed in the table above were normal in latency, amplitude and conduction velocity. Needle EMG:   · Needle EMG was performed using a concentric needle.  All muscles tested, as listed in the table above demonstrated normal amplitude, duration, phases and recruitment and no active denervation signs were seen. Diagnostic Interpretation: This study was abnormal.     Electrodiagnosis: There is electrodiagnostic evidence of a median mononeuropathy.    · Location: right at the wrist.   · Nature: [  ] Axonal   [ X ] Demyelinating  [  ] Mixed axonal and demyelinating     [  ] Sensory [  ] Motor               Eb.Appl  ] Mixed sensorimotor     [  ] with active denervation       Oriana.Shake  ] without active denervation  · Duration: Acute  · Severity: moderate  · Prognosis: Good. The prognosis for recovery of demyelinating lesions is good if the cause is alleviated. Previous Study: none      Follow up EMG is recommended if no surgical intervention and symptoms persist in one year. Technologist: Roya Farrell  Physician:    Marcelino Machado D.O., P.T. Board Certified Physical Medicine and Rehabilitation  Board Certified Electrodiagnostic Medicine      Nerve conduction studies and electromyography were performed according to our laboratory policies and procedures which can be provided upon request. All abnormal values are identified in the table.  Laboratory normal values can also be provided upon request.       Cc: DO Lang Ontiveros DO

## 2020-11-25 NOTE — PATIENT INSTRUCTIONS
Electrodiagnotic Laboratory  Accredited by the [facility] with Exemplary status  GADIEL Worley D.O. [facility]  [address] [facility] [place]  Phone: [phone]  Fax: [phone]        Today you had an electrodiagnostic exam which included nerve conduction studies (NCS) and electromyography (EMG). This test evaluated the electrical activity of your nerves and muscles to help determine if you have a nerve or muscle disease. This test can help determine the location and type of a nerve or muscle problem. This will help your referring doctor diagnose your condition and determine the appropriate next step in your treatment plan. After your test:    1. There are no long lasting side effects of the test.     2. You may resume your normal activities without restrictions. 3.  Resume any medications that were stopped for the test.     4  If you have sore areas or bruising in your muscles where the needle was placed, apply a cold pack to the sore area for 15-20 minutes three to four times a day as needed for pain. The soreness should go away in about 1-2 days. 5. Your results were provided  Briefly at the end of your test and the final detailed report will be provided to your referring physician, and/or primary care physician and any other parties you requested within 1-2 days of the examination. You may wish to contact your referring provider after a few days to determine what they would like you to do next. 6.  Please call [phone] with any questions or concerns and if you develop increased body temperature/fever, swelling, tenderness, increased pain and/or drainage from the sites where the needle was placed. Thank you for choosing us for your health care needs.

## 2020-12-01 ENCOUNTER — HOSPITAL ENCOUNTER (OUTPATIENT)
Age: 55
Discharge: HOME | End: 2020-12-03
Payer: COMMERCIAL

## 2020-12-01 PROCEDURE — U0003 INFECTIOUS AGENT DETECTION BY NUCLEIC ACID (DNA OR RNA); SEVERE ACUTE RESPIRATORY SYNDROME CORONAVIRUS 2 (SARS-COV-2) (CORONAVIRUS DISEASE [COVID-19]), AMPLIFIED PROBE TECHNIQUE, MAKING USE OF HIGH THROUGHPUT TECHNOLOGIES AS DESCRIBED BY CMS-2020-01-R: HCPCS

## 2020-12-01 RX ORDER — POTASSIUM CHLORIDE 1.5 G/1.77G
20 POWDER, FOR SOLUTION ORAL DAILY
COMMUNITY
End: 2021-08-19

## 2020-12-02 NOTE — PROGRESS NOTES
[address] [facility]  Electrodiagnostic Laboratory  *Accredited by the [address] [place] [address] with exemplary status  [address] [facility] [place]  Phone: [phone]  Fax: [phone]      Date of Examination: 12/02/20  Patient Name: Heidi Chanel  is a 54y.o. year old female who was seen due to complaints of   Chief Complaint   Patient presents with    Extremity Pain     more upper arm pain. pain described as achy. pain rated a 4. more pain at night. symptoms for about 6 months after shoulder tear.  Numbness     in the forearm and down in to the fingers. mostly the pinky, and thumb    Extremity Weakness     weakened  strength. Physical Exam: MSK: There is no joint effusion, deformity, instability, swelling, erythema or warmth. AROM is full in the spine and extremities. +Tinel right wrist. Neurologic:  No focal sensorimotor deficit. Reflexes 2+ and symmetric. Gait is normal.    Impression:   1. Right carpal tunnel syndrome        Plan:   · EMG is indicated to evaluate the above diagnosis. Orders Placed This Encounter   Procedures    FL NEEDLE EMG EA EXTREMTY W/PARASPINL AREA COMPLETE    FL MOTOR &/SENS 5-6 NRV CNDJ PRECONF ELTRODE LIMB     · EMG was done today and showed right carpal tunnel syndrome. The patient was educated about the diagnosis and the prognosis. · Recommend neutral wrist splints at h.s., OT and/or carpal tunnel injection and if no improvement after 4-6 weeks of conservative treatments consider orthopedic surgery evaluation. Recommend repeating the EMG in 1 year if symptoms persist.    · Advised patient to follow up with referring provider. Thank you for allowing me to participate in the care of your patient.       Sincerely,     Daiana Coello

## 2020-12-03 LAB
SARS-COV-2: NOT DETECTED
SOURCE: NORMAL

## 2020-12-08 ENCOUNTER — HOSPITAL ENCOUNTER (OUTPATIENT)
Age: 55
Setting detail: OUTPATIENT SURGERY
Discharge: HOME | End: 2020-12-08
Attending: ANESTHESIOLOGY | Admitting: ANESTHESIOLOGY
Payer: COMMERCIAL

## 2020-12-08 ENCOUNTER — HOSPITAL ENCOUNTER (OUTPATIENT)
Dept: OPERATING ROOM | Age: 55
Setting detail: OUTPATIENT SURGERY
Discharge: HOME | End: 2020-12-08
Attending: ANESTHESIOLOGY
Payer: COMMERCIAL

## 2020-12-08 ENCOUNTER — OFFICE VISIT (OUTPATIENT)
Dept: ORTHOPEDIC SURGERY | Age: 55
End: 2020-12-08
Payer: COMMERCIAL

## 2020-12-08 VITALS
BODY MASS INDEX: 45.31 KG/M2 | HEART RATE: 90 BPM | WEIGHT: 240 LBS | HEIGHT: 61 IN | SYSTOLIC BLOOD PRESSURE: 130 MMHG | DIASTOLIC BLOOD PRESSURE: 83 MMHG | OXYGEN SATURATION: 97 % | RESPIRATION RATE: 16 BRPM | TEMPERATURE: 97.4 F

## 2020-12-08 VITALS — BODY MASS INDEX: 45.12 KG/M2 | WEIGHT: 239 LBS | HEIGHT: 61 IN

## 2020-12-08 PROBLEM — M25.811 SHOULDER IMPINGEMENT, RIGHT: Status: ACTIVE | Noted: 2020-12-08

## 2020-12-08 PROBLEM — G56.01 RIGHT CARPAL TUNNEL SYNDROME: Status: ACTIVE | Noted: 2020-12-08

## 2020-12-08 PROBLEM — M75.41 SHOULDER IMPINGEMENT, RIGHT: Status: ACTIVE | Noted: 2020-12-08

## 2020-12-08 PROBLEM — M75.121 COMPLETE TEAR OF RIGHT ROTATOR CUFF: Status: ACTIVE | Noted: 2020-12-08

## 2020-12-08 PROCEDURE — G8427 DOCREV CUR MEDS BY ELIG CLIN: HCPCS | Performed by: ORTHOPAEDIC SURGERY

## 2020-12-08 PROCEDURE — 3600000015 HC SURGERY LEVEL 5 ADDTL 15MIN: Performed by: ANESTHESIOLOGY

## 2020-12-08 PROCEDURE — 3017F COLORECTAL CA SCREEN DOC REV: CPT | Performed by: ORTHOPAEDIC SURGERY

## 2020-12-08 PROCEDURE — 3600000005 HC SURGERY LEVEL 5 BASE: Performed by: ANESTHESIOLOGY

## 2020-12-08 PROCEDURE — 64493 INJ PARAVERT F JNT L/S 1 LEV: CPT | Performed by: ANESTHESIOLOGY

## 2020-12-08 PROCEDURE — 2709999900 HC NON-CHARGEABLE SUPPLY: Performed by: ANESTHESIOLOGY

## 2020-12-08 PROCEDURE — 3209999900 FLUORO FOR SURGICAL PROCEDURES

## 2020-12-08 PROCEDURE — 64494 INJ PARAVERT F JNT L/S 2 LEV: CPT | Performed by: ANESTHESIOLOGY

## 2020-12-08 PROCEDURE — G8417 CALC BMI ABV UP PARAM F/U: HCPCS | Performed by: ORTHOPAEDIC SURGERY

## 2020-12-08 PROCEDURE — 2500000003 HC RX 250 WO HCPCS: Performed by: ANESTHESIOLOGY

## 2020-12-08 PROCEDURE — G8484 FLU IMMUNIZE NO ADMIN: HCPCS | Performed by: ORTHOPAEDIC SURGERY

## 2020-12-08 PROCEDURE — 6360000002 HC RX W HCPCS: Performed by: ANESTHESIOLOGY

## 2020-12-08 PROCEDURE — 7100000011 HC PHASE II RECOVERY - ADDTL 15 MIN: Performed by: ANESTHESIOLOGY

## 2020-12-08 PROCEDURE — 99214 OFFICE O/P EST MOD 30 MIN: CPT | Performed by: ORTHOPAEDIC SURGERY

## 2020-12-08 PROCEDURE — 1036F TOBACCO NON-USER: CPT | Performed by: ORTHOPAEDIC SURGERY

## 2020-12-08 PROCEDURE — 7100000010 HC PHASE II RECOVERY - FIRST 15 MIN: Performed by: ANESTHESIOLOGY

## 2020-12-08 RX ORDER — LIDOCAINE HYDROCHLORIDE 5 MG/ML
INJECTION, SOLUTION INFILTRATION; INTRAVENOUS PRN
Status: DISCONTINUED | OUTPATIENT
Start: 2020-12-08 | End: 2020-12-08 | Stop reason: ALTCHOICE

## 2020-12-08 RX ORDER — METHYLPREDNISOLONE ACETATE 40 MG/ML
INJECTION, SUSPENSION INTRA-ARTICULAR; INTRALESIONAL; INTRAMUSCULAR; SOFT TISSUE PRN
Status: DISCONTINUED | OUTPATIENT
Start: 2020-12-08 | End: 2020-12-08 | Stop reason: ALTCHOICE

## 2020-12-08 RX ORDER — BUPIVACAINE HYDROCHLORIDE 5 MG/ML
INJECTION, SOLUTION PERINEURAL PRN
Status: DISCONTINUED | OUTPATIENT
Start: 2020-12-08 | End: 2020-12-08 | Stop reason: ALTCHOICE

## 2020-12-08 ASSESSMENT — PAIN DESCRIPTION - DESCRIPTORS: DESCRIPTORS: ACHING

## 2020-12-08 ASSESSMENT — PAIN SCALES - GENERAL
PAINLEVEL_OUTOF10: 0
PAINLEVEL_OUTOF10: 0

## 2020-12-08 ASSESSMENT — PAIN - FUNCTIONAL ASSESSMENT: PAIN_FUNCTIONAL_ASSESSMENT: 0-10

## 2020-12-08 NOTE — PROGRESS NOTES
Discharge instructions given, communicates understanding. VSS. Bandaids to back dry. Stood with RN assist, tolerated well. Discharged home with family.

## 2020-12-08 NOTE — H&P
[address]  [address] [facility], [address] [place] [address] [place]  [phone]    Procedure History & Physical      Reyne Hong Konger     HPI:    Patient  is here for Lumbar facet MBNB for low back pain. Labs/imaging studies reviewed   All question and concerns addressed including R/B/A associated with the procedure    Past Medical History:   Diagnosis Date    Anxiety     Arthritis     Asthma     Chronic headaches     Chronic pain     Depression     GERD (gastroesophageal reflux disease)     Hip pain     History of cardiovascular stress test 05/2015    dobutamine stress test    Hypertension     Incontinence in female     Irritable bowel syndrome     Neuromuscular disorder ([facility] [address]     Neuropathy     Obesity     Prolonged emergence from general anesthesia     sometimes slow to wake up slow to go to sleep       Past Surgical History:   Procedure Laterality Date    BONE GRAFT      BRONCHOSCOPY N/A 3/27/2018    BRONCHOSCOPY DIAGNOSTIC OR CELL [address] ONLY performed by Rickie Ferrer MD at [address] [place] [address]  3/27/2018    BRONCHOSCOPY BRUSHINGS performed by Rickie Ferrer MD at [place] [address] COLONOSCOPY      CYST REMOVAL      DILATION AND CURETTAGE OF UTERUS      ENDOSCOPY, COLON, DIAGNOSTIC  02/09/2017    FOOT SURGERY      FRACTURE SURGERY      right humerus    UPPER GASTROINTESTINAL ENDOSCOPY         Prior to Admission medications    Medication Sig Start Date End Date Taking? Authorizing Provider   potassium chloride (KLOR-CON) 20 MEQ packet Take 20 mEq by mouth daily   Yes Historical Provider, MD   busPIRone (BUSPAR) 15 MG tablet Take 15 mg by mouth 3 times daily   Yes Historical Provider, MD   VORTIoxetine (TRINTELLIX) 10 MG TABS tablet Take 10 mg by mouth daily   Yes Historical Provider, MD   HYDROcodone-acetaminophen (NORCO) 7.5-325 MG per tablet Take 1 tablet by mouth every 6 hours as needed for Pain.    Yes Historical Provider, MD   etodolac (LODINE) 400 MG tablet TAKE ONE TABLET BY MOUTH TWO TIMES A DAY WITH FOOD 8/17/20  Yes Historical Provider, MD   hydrOXYzine (VISTARIL) 25 MG capsule Take 25 mg by mouth 3 times daily as needed for Itching   Yes Historical Provider, MD   metoprolol tartrate (LOPRESSOR) 50 MG tablet Take 50 mg by mouth 2 times daily   Yes Historical Provider, MD   VRAYLAR 1.5 MG capsule  11/10/20   Historical Provider, MD   lamoTRIgine (LAMICTAL) 100 MG tablet Take 100 mg by mouth daily  9/30/20   Historical Provider, MD   losartan-hydroCHLOROthiazide (HYZAAR) 100-12.5 MG per tablet Take 1 tablet by mouth daily    Historical Provider, MD   melatonin 3 MG TABS tablet Take 10 mg by mouth nightly as needed     Historical Provider, MD   budesonide-formoterol (SYMBICORT) 160-4.5 MCG/ACT AERO Inhale 2 puffs into the lungs 2 times daily    Historical Provider, MD   ciclopirox (LOPROX) 0.77 % cream Apply topically 2 times daily Apply topically 2 times daily. Historical Provider, MD   hydrocortisone (HYTONE) 2.5 % lotion Apply topically 2 times daily Apply topically 2 times daily. Historical Provider, MD   loratadine (CLARITIN) 10 MG tablet loratadine 10 mg tablet    Historical Provider, MD   tiZANidine (ZANAFLEX) 4 MG tablet Take 1 tablet by mouth 3 times daily  Patient taking differently: Take 4 mg by mouth 2 times daily  10/18/18   Hallie Christensen DO   albuterol sulfate  (90 BASE) MCG/ACT inhaler Inhale 2 puffs into the lungs every 6 hours as needed for Wheezing    Historical Provider, MD   montelukast (SINGULAIR) 10 MG tablet Take 10 mg by mouth nightly. Historical Provider, MD   gabapentin (NEURONTIN) 600 MG tablet Take 600 mg by mouth 2 times daily.      Historical Provider, MD   pantoprazole (PROTONIX) 40 MG tablet Take 40 mg by mouth daily     Historical Provider, MD       Allergies   Allergen Reactions    Cymbalta [Duloxetine Hcl] Other (See Comments)     anger    Dexlansoprazole Nausea Only    Diovan [Valsartan] Other (See Comments)     Cough, sore throat    Ditropan [Oxybutynin] Other (See Comments)     hoarsness    Lunesta [Eszopiclone] Other (See Comments)     Bad dreams    Molds & Smuts     Norvasc [Amlodipine Besylate] Other (See Comments)     cough    Sular [Nisoldipine Er] Other (See Comments)     Cough      Tape Ali Escobar Tape]      rash    Dextromethorphan Polistirex Er Rash    Levaquin [Levofloxacin] Rash    Other Rash     Strawberries, wheat, peppers    Questran [Cholestyramine] Rash    Yellow Dyes (Non-Tartrazine) Rash     Yellow dye #6, (#11 & #5 if combined)       Social History     Socioeconomic History    Marital status: Single     Spouse name: Not on file    Number of children: Not on file    Years of education: Not on file    Highest education level: Not on file   Occupational History    Not on file   Social Needs    Financial resource strain: Not on file    Food insecurity     Worry: Not on file     Inability: Not on file    Transportation needs     Medical: Not on file     Non-medical: Not on file   Tobacco Use    Smoking status: Never Smoker    Smokeless tobacco: Never Used   Substance and Sexual Activity    Alcohol use: No    Drug use: No    Sexual activity: Not Currently   Lifestyle    Physical activity     Days per week: Not on file     Minutes per session: Not on file    Stress: Not on file   Relationships    Social connections     Talks on phone: Not on file     Gets together: Not on file     Attends Caodaism service: Not on file     Active member of club or organization: Not on file     Attends meetings of clubs or organizations: Not on file     Relationship status: Not on file    Intimate partner violence     Fear of current or ex partner: Not on file     Emotionally abused: Not on file     Physically abused: Not on file     Forced sexual activity: Not on file   Other Topics Concern    Not on file   Social History Narrative    Not on file material risks, benefits, and reasonable alternatives including postponing the procedure were discussed. The patient does wish to proceed with the procedure at this time.       Dory Kay MD

## 2020-12-08 NOTE — OP NOTE
Operative Note      Patient: Ronal Haque  YOB: 1965  MRN: [id number]    Date of Procedure: 2020    Pre-Op Diagnosis: LUMBOSACRAL SPONDYLOSIS WITHOUT MYELOPATHY    Post-Op Diagnosis: Same       Procedure(s):  BILATERAL LUMBAR MEDIAL BRANCH NERVE BLOCK UNDER FLUOROSCOPIC GUIDANCE AT L3,L4,L5 AND DORSAL RAMI     Surgeon(s):  Ceci Tatum MD    Assistant:   * No surgical staff found *    Anesthesia: Local    Estimated Blood Loss (mL): Minimal    Complications: None    Specimens:   * No specimens in log *    Implants:  * No implants in log *      Drains: * No LDAs found *    Findings: good needle placement    Detailed Description of Procedure:   2020    Patient: Ronal Haque  :  1965  Age:  54 y.o. Sex:  female     PRE-OPERATIVE DIAGNOSIS: Bilateral Lumbar spondylosis, lumbar facet syndrome. POST-OPERATIVE DIAGNOSIS: Same. PROCEDURE:  # 1 Fluoroscopic guided lumbar medial branch blocks Bilateral at Levels: L3, L4, L5  SURGEON: Ceci Tatum MD    ANESTHESIA: Local    ESTIMATED BLOOD LOSS: None.  ______________________________________________________________________  BRIEF HISTORY:  Ronal Haque comes in today for 1 fluoroscopic guided lumbar medial branch blocks  Bilateral  at Levels: L3, L4, L5  The potential complications of this procedure were discussed with her again today. She has elected to undergo the aforementioned procedureVanesa Avalos complete History & Physical examination were reviewed in depth, a copy of which is in the chart. DESCRIPTION OF PROCEDURE:   After confirming written and informed consent, a time-out was performed and Robbie name and date of birth, the procedure to be performed as well as the plan for the location of the needle insertion were confirmed. The patient was brought into the procedure room and placed in the prone position on the fluoroscopy table.  Standard monitors were placed and vital signs

## 2020-12-08 NOTE — PROGRESS NOTES
Chief Complaint   Patient presents with    Hip Pain     Left hip follow up.  Shoulder Pain     Right shoulder MRI follow up. Also had emg done. Damian Ariza is a 54y.o. year old   female who is seen today  for follow up of right shoulder pain. She reports the pain has been ongoing for the past few months. She does recall a specific injury which started the pain. She had an injujry several years ago requiring ORIF. She reports the pain is worse with movtion, better with rest.  The patient does not have mechanical symptoms. Shedoes have night pain. She denies a feeling of instability. The prior treatments have been CSI. The patient   has not responded to the treatment. The patient is right hand dominant. Chief Complaint   Patient presents with    Hip Pain     Left hip follow up.  Shoulder Pain     Right shoulder MRI follow up. Also had emg done.      Past Medical History:   Diagnosis Date    Anxiety     Arthritis     Asthma     Chronic headaches     Chronic pain     Depression     GERD (gastroesophageal reflux disease)     Hip pain     History of cardiovascular stress test 05/2015    dobutamine stress test    Hypertension     Incontinence in female     Irritable bowel syndrome     Neuromuscular disorder ([facility] [address]     Neuropathy     Obesity     Prolonged emergence from general anesthesia     sometimes slow to wake up slow to go to sleep     Past Surgical History:   Procedure Laterality Date    BONE GRAFT      BRONCHOSCOPY N/A 3/27/2018    BRONCHOSCOPY DIAGNOSTIC OR CELL [address] ONLY performed by Daniela Beck MD at [id number] [place] [address]  3/27/2018    BRONCHOSCOPY BRUSHINGS performed by Daniela Beck MD at [address] OF UTERUS      ENDOSCOPY, COLON, DIAGNOSTIC  02/09/2017    FOOT SURGERY      FRACTURE SURGERY      right humerus    UPPER GASTROINTESTINAL ENDOSCOPY         Current Outpatient Medications:     potassium chloride (KLOR-CON) 20 MEQ packet, Take 20 mEq by mouth daily, Disp: , Rfl:     busPIRone (BUSPAR) 15 MG tablet, Take 15 mg by mouth 3 times daily, Disp: , Rfl:     VORTIoxetine (TRINTELLIX) 10 MG TABS tablet, Take 10 mg by mouth daily, Disp: , Rfl:     VRAYLAR 1.5 MG capsule, , Disp: , Rfl:     lamoTRIgine (LAMICTAL) 100 MG tablet, Take 100 mg by mouth daily , Disp: , Rfl:     losartan-hydroCHLOROthiazide (HYZAAR) 100-12.5 MG per tablet, Take 1 tablet by mouth daily, Disp: , Rfl:     melatonin 3 MG TABS tablet, Take 10 mg by mouth nightly as needed , Disp: , Rfl:     HYDROcodone-acetaminophen (NORCO) 7.5-325 MG per tablet, Take 1 tablet by mouth every 6 hours as needed for Pain., Disp: , Rfl:     budesonide-formoterol (SYMBICORT) 160-4.5 MCG/ACT AERO, Inhale 2 puffs into the lungs 2 times daily, Disp: , Rfl:     ciclopirox (LOPROX) 0.77 % cream, Apply topically 2 times daily Apply topically 2 times daily. , Disp: , Rfl:     hydrocortisone (HYTONE) 2.5 % lotion, Apply topically 2 times daily Apply topically 2 times daily. , Disp: , Rfl:     etodolac (LODINE) 400 MG tablet, TAKE ONE TABLET BY MOUTH TWO TIMES A DAY WITH FOOD, Disp: , Rfl:     hydrOXYzine (VISTARIL) 25 MG capsule, Take 25 mg by mouth 3 times daily as needed for Itching, Disp: , Rfl:     loratadine (CLARITIN) 10 MG tablet, loratadine 10 mg tablet, Disp: , Rfl:     metoprolol tartrate (LOPRESSOR) 50 MG tablet, Take 50 mg by mouth 2 times daily, Disp: , Rfl:     tiZANidine (ZANAFLEX) 4 MG tablet, Take 1 tablet by mouth 3 times daily (Patient taking differently: Take 4 mg by mouth 2 times daily ), Disp: 90 tablet, Rfl: 1    albuterol sulfate  (90 BASE) MCG/ACT inhaler, Inhale 2 puffs into the lungs every 6 hours as needed for Wheezing, Disp: , Rfl:     montelukast (SINGULAIR) 10 MG tablet, Take 10 mg by mouth nightly., Disp: , Rfl:     gabapentin (NEURONTIN) 600 MG tablet, Take 600 mg by mouth 2 times daily.  , Disp: , Rfl:     pantoprazole (PROTONIX) 40 MG tablet, Take 40 mg by mouth daily , Disp: , Rfl:   Allergies   Allergen Reactions    Cymbalta [Duloxetine Hcl] Other (See Comments)     anger    Dexlansoprazole Nausea Only    Diovan [Valsartan] Other (See Comments)     Cough, sore throat    Ditropan [Oxybutynin] Other (See Comments)     hoarsness    Lunesta [Eszopiclone] Other (See Comments)     Bad dreams    Molds & Smuts     Norvasc [Amlodipine Besylate] Other (See Comments)     cough    Sular [Nisoldipine Er] Other (See Comments)     Cough      Tape Janae Mean Tape]      rash    Dextromethorphan Polistirex Er Rash    Levaquin [Levofloxacin] Rash    Other Rash     Strawberries, wheat, peppers    Questran [Cholestyramine] Rash    Yellow Dyes (Non-Tartrazine) Rash     Yellow dye #6, (#11 & #5 if combined)     Social History     Socioeconomic History    Marital status: Single     Spouse name: Not on file    Number of children: Not on file    Years of education: Not on file    Highest education level: Not on file   Occupational History    Not on file   Social Needs    Financial resource strain: Not on file    Food insecurity     Worry: Not on file     Inability: Not on file    Transportation needs     Medical: Not on file     Non-medical: Not on file   Tobacco Use    Smoking status: Never Smoker    Smokeless tobacco: Never Used   Substance and Sexual Activity    Alcohol use: No    Drug use: No    Sexual activity: Not Currently   Lifestyle    Physical activity     Days per week: Not on file     Minutes per session: Not on file    Stress: Not on file   Relationships    Social connections     Talks on phone: Not on file     Gets together: Not on file     Attends Islam service: Not on file     Active member of club or organization: Not on file     Attends meetings of clubs or organizations: Not on file     Relationship status: Not on file   Floydene Ada Intimate partner violence     Fear of current or ex partner: Not on file     Emotionally abused: Not on file     Physically abused: Not on file     Forced sexual activity: Not on file   Other Topics Concern    Not on file   Social History Narrative    Not on file     Family History   Problem Relation Age of Onset    Other Mother     High Blood Pressure Mother     Cancer Father     High Blood Pressure Brother     High Blood Pressure Maternal Grandfather        REVIEW OF SYSTEMS:     General/Constitution:  (-)weight loss, (-)fever, (-)chills, (-)weakness. Skin: (-) rash,(-) psoriasis,(-) eczema, (-)skin cancer. Musculoskeletal: (-) fractures,  (-) dislocations,(-) collagen vascular disease, (-) fibromyalgia, (-) multiple sclerosis, (-) muscular dystrophy, (-) RSD,(-) joint pain (-)swelling, (-) joint pain,swelling. Neurologic: (-) epilepsy, (-)seizures,(-) brain tumor,(-) TIA, (-)stroke, (-)headaches, (-)Parkinson disease,(-) memory loss, (-) LOC. Cardiovascular: (-) Chest pain, (-) swelling in legs/feet, (-) SOB, (-) cramping in legs/feet with walking. Respiratory: (-) SOB, (-) Coughing, (-) night sweats. GI: (-) nausea, (-) vomiting, (-) diarrhea, (-) blood in stool, (-) gastric ulcer. Psychiatric: (-) Depression, (-) Anxiety, (-) bipolar disease, (-) Alzheimer's Disease  Allergic/Immunologic: (-) allergies latex, (-) allergies metal, (-) skin sensitivity. Hematlogic: (-) anemia, (-) blood transfusion, (-) DVT/PE, (-) Clotting disorders      Subjective:    Constitution:  Ht 5' 1\" (1.549 m)   Wt 239 lb (108.4 kg)   BMI 45.16 kg/m²     Psycihatric:  The patient is alert and oriented x 3, appears to be stated age and in no distress. Respiratory:  Respiratory effort is not labored. Patient is not gasping. Palpation of the chest reveals no tactile fremitus. Skin:  Upon inspection: the skin appears warm, dry and intact. There is not a previous scar over the affected area. There is not any cellulitis, lymphedema or cutaneous lesions noted in the lower extremities. Upon palpation there is no induration noted. Neurologic:  Motor exam of the upper extremities show: The reflexes in biceps/triceps/brachioradialis are equal and symmetric. Sensory exam C5-T1 are normal bilaterally. Cardiovascular: The vascular exam is normal and is well perfused to distal extremities. There are 2+ radial pulses bilaterally, and motor and sensation is intact to , ulnar, and radial, musclocutaneus, and axillary nerve distribution and grossly symmetric bilaterally. There is cap refill noted less than two seconds in all digits. There is not edema of the bilateral upper extremities. There is not varicosities noted in the distal extremities. +median nerve distribution altered    Lymph:  Upon palpation,  there is no lymphadenopathy noted in bilateral upper extremities. Musculoskeletal:  Gait: normal; examination of the nails and digits reveal no cyanosis or clubbing. Cervical Exam:  On physical exam, Rhett Rcok is well-developed, well-nourished, oriented to person, place and time. her gait is normal.  On evaluation of hercervical spine, She has full range of motion of the cervical spine without pain. There is no cervical tenderness to palpation. Shoulder Exam:  On evaluation of her bilaterally upper extremities, her right shoulder has no deformity. There is tenderness upon palpation of the lateral shoulder. There is not evidence of scapular dyskinesis. There is not muscle atrophy in shoulder girdle. The range of motion for the Right Shoulder is 140/45/t10 and for the Left shoulder is 150/50/t8. Right shoulder Motor strength is 5-/5 in the supraspinatus, 5/5 internal rotation and 5-/5 in external rotation, and Left shoulder motor strength 5/5 in supraspinatus, 5/5 in internal rotation, 5/5 in external rotation.         Right shoulder:  positive Impingement , positive Joseph ,negative Speeds,negative  Apprehension ,negative Evans Load Shift, negative Singh manuver, negative Cross arm test.     Left shoulder:  negative Impingement , negative Joseph ,negative  Speeds,negative  Apprehension ,negative Evans Load Shift, negative Singh manuver, negative Cross arm test.     XRAY:      MRI:  Electrodiagnosis: There is electrodiagnostic evidence of a median mononeuropathy. · Location: right at the wrist.   · Nature: [  ] Axonal   [ X ] Demyelinating  [  ] Mixed axonal and demyelinating                     [  ] Sensory [  ] Motor               Samuel.Dears  ] Mixed sensorimotor                     [  ] with active denervation       Samuel.Dears  ] without active denervation  · Duration: Acute  · Severity: moderate  Prognosis: Good. The prognosis for recovery of demyelinating lesions is good if the cause is alleviated. EMG:Electrodiagnosis: There is electrodiagnostic evidence of a median mononeuropathy. · Location: right at the wrist.   · Nature: [  ] Axonal   [ X ] Demyelinating  [  ] Mixed axonal and demyelinating                     [  ] Sensory [  ] Motor               Samuel.Dears  ] Mixed sensorimotor                     [  ] with active denervation       Samuel.Dears  ] without active denervation  · Duration: Acute  · Severity: moderate  Prognosis: Good. The prognosis for recovery of demyelinating lesions is good if the cause is alleviated. Radiographic findings reviewed with patient    Impression:   Encounter Diagnoses   Name Primary?  Right carpal tunnel syndrome Yes    Complete tear of right rotator cuff, unspecified whether traumatic     Shoulder impingement, right        Plan: Natural history and expected course discussed. Questions answered. Educational material distributed. Reduction in offending activity. Gentle ROM exercises  RICE therapy. NSAIDs per medication orders. I had a lengthy discussion with the patient regarding their diagnosis.  I explained treatment options including surgical vs non

## 2020-12-16 ENCOUNTER — TELEPHONE (OUTPATIENT)
Dept: BARIATRICS/WEIGHT MGMT | Age: 55
End: 2020-12-16

## 2020-12-16 ENCOUNTER — OFFICE VISIT (OUTPATIENT)
Dept: PHYSICAL MEDICINE AND REHAB | Age: 55
End: 2020-12-16
Payer: COMMERCIAL

## 2020-12-16 VITALS
BODY MASS INDEX: 44.93 KG/M2 | HEART RATE: 79 BPM | WEIGHT: 238 LBS | SYSTOLIC BLOOD PRESSURE: 144 MMHG | HEIGHT: 61 IN | DIASTOLIC BLOOD PRESSURE: 94 MMHG

## 2020-12-16 PROCEDURE — 20526 THER INJECTION CARP TUNNEL: CPT | Performed by: PHYSICAL MEDICINE & REHABILITATION

## 2020-12-16 RX ORDER — LIDOCAINE HYDROCHLORIDE 10 MG/ML
2 INJECTION, SOLUTION INFILTRATION; PERINEURAL ONCE
Status: COMPLETED | OUTPATIENT
Start: 2020-12-16 | End: 2020-12-16

## 2020-12-16 RX ORDER — TRIAMCINOLONE ACETONIDE 40 MG/ML
40 INJECTION, SUSPENSION INTRA-ARTICULAR; INTRAMUSCULAR ONCE
Status: COMPLETED | OUTPATIENT
Start: 2020-12-16 | End: 2020-12-16

## 2020-12-16 RX ADMIN — LIDOCAINE HYDROCHLORIDE 2 ML: 10 INJECTION, SOLUTION INFILTRATION; PERINEURAL at 13:35

## 2020-12-16 RX ADMIN — TRIAMCINOLONE ACETONIDE 40 MG: 40 INJECTION, SUSPENSION INTRA-ARTICULAR; INTRAMUSCULAR at 13:36

## 2020-12-16 NOTE — TELEPHONE ENCOUNTER
Pt was scheduled on 12/17 for initial wt loss appt and called requesting a later appointment or to reschedule. Rescheduled appt for 12/18/20.

## 2020-12-18 ENCOUNTER — INITIAL CONSULT (OUTPATIENT)
Dept: BARIATRICS/WEIGHT MGMT | Age: 55
End: 2020-12-18
Payer: COMMERCIAL

## 2020-12-18 ENCOUNTER — OFFICE VISIT (OUTPATIENT)
Dept: BARIATRICS/WEIGHT MGMT | Age: 55
End: 2020-12-18
Payer: COMMERCIAL

## 2020-12-18 VITALS
DIASTOLIC BLOOD PRESSURE: 81 MMHG | SYSTOLIC BLOOD PRESSURE: 133 MMHG | TEMPERATURE: 97.1 F | BODY MASS INDEX: 44.6 KG/M2 | WEIGHT: 236.2 LBS | HEIGHT: 61 IN | HEART RATE: 74 BPM

## 2020-12-18 VITALS — HEIGHT: 61 IN | BODY MASS INDEX: 44.6 KG/M2 | WEIGHT: 236.2 LBS

## 2020-12-18 PROCEDURE — 99214 OFFICE O/P EST MOD 30 MIN: CPT | Performed by: INTERNAL MEDICINE

## 2020-12-18 PROCEDURE — G8417 CALC BMI ABV UP PARAM F/U: HCPCS | Performed by: INTERNAL MEDICINE

## 2020-12-18 PROCEDURE — 3017F COLORECTAL CA SCREEN DOC REV: CPT | Performed by: INTERNAL MEDICINE

## 2020-12-18 PROCEDURE — G8428 CUR MEDS NOT DOCUMENT: HCPCS | Performed by: INTERNAL MEDICINE

## 2020-12-18 PROCEDURE — 99211 OFF/OP EST MAY X REQ PHY/QHP: CPT

## 2020-12-18 PROCEDURE — G8484 FLU IMMUNIZE NO ADMIN: HCPCS | Performed by: INTERNAL MEDICINE

## 2020-12-18 PROCEDURE — 99999 PR OFFICE/OUTPT VISIT,PROCEDURE ONLY: CPT

## 2020-12-18 PROCEDURE — 1036F TOBACCO NON-USER: CPT | Performed by: INTERNAL MEDICINE

## 2020-12-18 NOTE — PATIENT INSTRUCTIONS
Patient Instructions   Rules:  · Count every calorie every day  · Limit sweets to one day per month  · Limit chips/crackers/pretzels/nuts to 150 tone/day  · Limit restaurants (including fast food and food from a convenience store) to one time every two weeks  · Eliminate all sugar sweetened beverages    Requirements:  · Make sure protein intake is at least 60 grams per day (Consider using a protein shake - Nectar, Pure Protein, Premier, Slimfast advanced, Boost Max, Ensure Max, BeneProtein and [employer] (which is lactose-free) are milk-based options; Nectar, Premier Protein Clear, IsoPure Protein Drink, and Protein 2 O are water-based options; Quest (or Cosco, which is cheaper and is ordered on [employer]) and the Oh CardiaLen protein bars can also be used, but have less protein in them )  · Make sure that fiber intake is at least 20 grams per day. Take 4 tablespoons of wheat dextrin (Benefiber or equivalent) or 12 tablespoons of original plain Fiber One every day. Work up to this amount slowly by starting with only one-fourth of the target amount and adding another one-fourth every one or two weeks  · Take one multivitamin every day    Goals:  · Limit calorie intake to 1200 calories/day  · Change calorie intake to 1600 tone/day for the five days prior to the shoulder surgery until five days after surgery  · Avoid eating 2 hours within bedtime. Tips:  · Do not eat outside of the dining room or the kitchen  · Do not eat while watching TV, videos, working on the computer or using a smart phone  · Do not eat food out of a multi-serving bag or container.     Return to see me in February (per pt preference)

## 2020-12-18 NOTE — PROGRESS NOTES
CC -                     Ortho (Dr. Zohreh Nassar) - L MONIK   L hip pain, Obesity    Background -   Last visit: 11/20/20  First visit: 10/16/20    · L Hip Pain  Began 10/2020 due to a labrum tear  Severity is 8/10  Sees Dr. Zohreh Nassar and is preparing for a L MONIK and needs to reduce her weight to 210 lbs (pt states at 12/18/20 appt that she was told that she only needs to reduce her weight to 230 lbs    · Obesity   Began in childhood  Initial BMI 46.5, Wt 242.2  HS Grad wt 180 lbs   Lowest wt 180 lbs   Highest wt 260 lbs  Pattern of wt gain: grad with rapid increase one year ago  Wt change past yr: +40 then -20  Most wt lost: 20 lbs (calorie counting + protein shake meal replacements)  Other diets attempted: Husse[place]ksocorro, Phentermine (Dr. José Luis Sebastian), [employer], OTC meds    Initial Diet:    Number of meals per day - 3    Number of snacks per day - graze    Meal volume - 12\" plate, usually seconds    Fast food/convenience store - 2x/week (for 2-3 months in the past year, o/w none)    Restaurants (not fast food) - 0x/week   Sweets - 7d/week   Chips - 7d/week   Crackers/pretzels - 3d/week   Nuts - 2d/week   Peanut Butter - 4d/week   Popcorn - 2d/week   Dried fruit - 0-1d/week   Whole fruit - 3-4d/week (1-6 servings)   Breakfast cereal - 0d/week   Granola/Protein/Energy bar - 0d/week   Sugar sweetened beverages - 16 oz Gatorade/day (125 tone)   Protein - No supplements   Fiber - No supplements     Exercise:    Gym membership - None    Walking - None    Running - None    Resistance - 60 min bands/1d/wk    Aerobic class - 30-45 min/d, 2d/wk    ______________________    [facility] -  Med Prob: L hip pain, R shoulder pain, Obesity, Depression, GERD, Neuropathy, Asthma  Current Outpatient Medications   Medication Sig Dispense Refill    Misc. Devices (WRIST BRACE) MISC Right wrist brace for carpal tunnel. Wear at bedtime.  1 each 0    potassium chloride (KLOR-CON) 20 MEQ packet Take 20 mEq by mouth daily      busPIRone (BUSPAR) 15 MG tablet Take 15 mg by mouth 3 times daily      VORTIoxetine (TRINTELLIX) 10 MG TABS tablet Take 10 mg by mouth daily      VRAYLAR 1.5 MG capsule       lamoTRIgine (LAMICTAL) 100 MG tablet Take 100 mg by mouth daily       losartan-hydroCHLOROthiazide (HYZAAR) 100-12.5 MG per tablet Take 1 tablet by mouth daily      melatonin 3 MG TABS tablet Take 10 mg by mouth nightly as needed       HYDROcodone-acetaminophen (NORCO) 7.5-325 MG per tablet Take 1 tablet by mouth every 6 hours as needed for Pain.  budesonide-formoterol (SYMBICORT) 160-4.5 MCG/ACT AERO Inhale 2 puffs into the lungs 2 times daily      ciclopirox (LOPROX) 0.77 % cream Apply topically 2 times daily Apply topically 2 times daily.  hydrocortisone (HYTONE) 2.5 % lotion Apply topically 2 times daily Apply topically 2 times daily.  etodolac (LODINE) 400 MG tablet TAKE ONE TABLET BY MOUTH TWO TIMES A DAY WITH FOOD      hydrOXYzine (VISTARIL) 25 MG capsule Take 25 mg by mouth 3 times daily as needed for Itching      loratadine (CLARITIN) 10 MG tablet loratadine 10 mg tablet      metoprolol tartrate (LOPRESSOR) 50 MG tablet Take 50 mg by mouth 2 times daily      tiZANidine (ZANAFLEX) 4 MG tablet Take 1 tablet by mouth 3 times daily (Patient taking differently: Take 4 mg by mouth 2 times daily ) 90 tablet 1    albuterol sulfate  (90 BASE) MCG/ACT inhaler Inhale 2 puffs into the lungs every 6 hours as needed for Wheezing      montelukast (SINGULAIR) 10 MG tablet Take 10 mg by mouth nightly.  gabapentin (NEURONTIN) 600 MG tablet Take 600 mg by mouth 2 times daily.  pantoprazole (PROTONIX) 40 MG tablet Take 40 mg by mouth daily        No current facility-administered medications for this visit.         ROS -  Card - no CP  GI - no N/V/D    PE -  Gen : /81 (Site: Left Upper Arm, Position: Sitting, Cuff Size: Large Adult)   Pulse 74   Temp 97.1 °F (36.2 °C) (Temporal)   Ht 5' 0.5\" (1.537 m)   Wt 236 lb 3.2 oz (107.1 kg)   BMI 45.37 kg/m²    WN, WD, NAD  Lung: Nml resp effort  Psych: Normal mood   Full affect  Neuro: Moves all ext well  ______________________      HPI & A/P -   Problem 1  - L Hip Pain   HPI   - See above background for description      7/10 in severity      Preparing for MONIK. Must reduce wt to 210 lbs  Assessment  - Uncontrolled  Plan   - Proceed with wt reduction per the plan below    Problem 2  - Obesity   HPI   - See above Background for description    Weight  Date    242.2 lbs 10/16/20    240.8 lbs 11/20/20 (per her PCP scales 11/19/20)    236.2 lbs 12/18/20    Total wt loss to date: 6.0 lbs    DEN: 1650 tone/day    Would like to reduce her weight as rapidly as possible    However, financial constraints (food stamps and food bank) prevent her from a VLCD    Therefore chose a counting-based regimen with rules of elimination and have her meet with the dietician    Not counting calories; states that she is eating <1200 tone/day    Drinking a protein shake periodically; however meeting her protein target without it    Taking a fiber supplement (Fiber WellFit, 5-10 grams/day)    Not drinking any SSBs    Eating sweets 1x/2weeks    Has a shoulder surgery planned 1/8/21 (repair of a rotator cuff tear)    Assessment  - Slight improvement  Plan   -   Patient Instructions   Rules:  · Count every calorie every day  · Limit sweets to one day per month  · Limit chips/crackers/pretzels/nuts to 150 tone/day  · Limit restaurants (including fast food and food from a convenience store) to one time every two weeks  · Eliminate all sugar sweetened beverages    Requirements:  · Make sure protein intake is at least 60 grams per day (Consider using a protein shake - Nectar, Pure Protein, Premier, Slimfast advanced, Boost Max, Ensure Max, BeneProtein and [employer] (which is lactose-free) are milk-based options;  Nectar, Premier Protein Clear, IsoPure Protein Drink, and Protein 2 O are water-based options; Quest (or Cosco, which is cheaper and is ordered on [employer]) and the Oh Yeah 1 protein bars can also be used, but have less protein in them )  · Make sure that fiber intake is at least 20 grams per day. Take 4 tablespoons of wheat dextrin (Benefiber or equivalent) or 12 tablespoons of original plain Fiber One every day. Work up to this amount slowly by starting with only one-fourth of the target amount and adding another one-fourth every one or two weeks  · Take one multivitamin every day    Goals:  · Limit calorie intake to 1200 calories/day  · Change calorie intake to 1600 tone/day for the five days prior to the shoulder surgery until five days after surgery  · Avoid eating 2 hours within bedtime. Tips:  · Do not eat outside of the dining room or the kitchen  · Do not eat while watching TV, videos, working on the computer or using a smart phone  · Do not eat food out of a multi-serving bag or container. Return to see me in February (per pt preference)    I spent 25 minutes in a face to face encounter with Josselyn Velasquez today.    >15 minutes of this was in education and counseling regarding dietary interventions for weight reduction, weight goal for surgery, calorie intake and milan-operative calorie restriction    Kory Dwyer MD  Endocrinology/Obesity  12/18/20

## 2020-12-18 NOTE — PROGRESS NOTES
Provided follow up wt loss dietary counseling to pt. Pt weighed 236.2 lbs today, in office. This indicates a loss of 6 lbs since beginning weight loss on 10/16/20 (242.2 lbs). Plan is as follows, per Dr Sofie Fontenot:    Rules:  · Count every calorie every day  · Limit sweets to one day per month  · Limit chips/crackers/pretzels/nuts to 150 tone/day  · Limit restaurants (including fast food and food from a convenience store) to one time every two weeks  · Eliminate all sugar sweetened beverages     Requirements:  · Make sure protein intake is at least 60 grams per day (Consider using a protein shake - Nectar, Pure Protein, Premier, Slimfast advanced, Boost Max, Ensure Max, BeneProtein and [employer] (which is lactose-free) are milk-based options; Nectar, Premier Protein Clear, IsoPure Protein Drink, and Protein 2 O are water-based options; Quest (or Cosco, which is cheaper and is ordered on [employer]) and the Oh [employer] protein bars can also be used, but have less protein in them )  · Make sure that fiber intake is at least 20 grams per day. Take 4 tablespoons of wheat dextrin (Benefiber or equivalent) or 12 tablespoons of original plain Fiber One every day. Work up to this amount slowly by starting with only one-fourth of the target amount and adding another one-fourth every one or two weeks  · Take one multivitamin every day     Goals:  · Limit calorie intake to 1200 calories/day  · Change calorie intake to 1600 tone/day for the five days prior to the shoulder surgery until five days after surgery  · Avoid eating 2 hours within bedtime. ·  She is counting and is now writing everything down and keeping a notebook. Pt is limiting sweets per plan. Pt is limiting chips and other salty snacks per plan. Pt is limiting restaurants per plan. Pt is now avoiding SSB's per plan and avoids fruit juice.    Pt is meeting protein goal.  She is using a 30 gram protein shake once daily to help meet her protein goal.    Pt has been using FiberWell, however, is not getting sufficient fiber and plans to begin using Benefiber as per above suggestion. MVI was not discussed.     Overall, pt is progressing with her plan. Pt had many questions today. All were answered and it became evident that pt is following advice, as given at our first consultation. Pt has improved impressively and is now quite compliant in most areas of her plan. Pt requested carb goal per day and so all macros were given as follows:  60 grams protein, 40 grams fat and 150 grams carbohydrate per day. This will equal 1200 calories, which is her new daily goal (was 1100). Expressed appreciation to pt for now following her plan so well and encouraged her to continue on. Reviewed pt's notebook and she is doing very well with keeping food records. Pt will begin using myfitnesspal to record her daily intake. Pt is to have follow up on 2/15/21 and is free to call [phone] as needed for further questions.

## 2020-12-29 RX ORDER — CHLORTHALIDONE 50 MG/1
50 TABLET ORAL DAILY
COMMUNITY
End: 2022-06-28

## 2021-01-04 ENCOUNTER — HOSPITAL ENCOUNTER (OUTPATIENT)
Age: 56
Discharge: HOME | End: 2021-01-06
Payer: MEDICAID

## 2021-01-04 DIAGNOSIS — M75.121 COMPLETE TEAR OF RIGHT ROTATOR CUFF, UNSPECIFIED WHETHER TRAUMATIC: ICD-10-CM

## 2021-01-04 PROCEDURE — U0003 INFECTIOUS AGENT DETECTION BY NUCLEIC ACID (DNA OR RNA); SEVERE ACUTE RESPIRATORY SYNDROME CORONAVIRUS 2 (SARS-COV-2) (CORONAVIRUS DISEASE [COVID-19]), AMPLIFIED PROBE TECHNIQUE, MAKING USE OF HIGH THROUGHPUT TECHNOLOGIES AS DESCRIBED BY CMS-2020-01-R: HCPCS

## 2021-01-05 LAB
SARS-COV-2: NOT DETECTED
SOURCE: NORMAL

## 2021-01-06 ENCOUNTER — PREP FOR PROCEDURE (OUTPATIENT)
Dept: PAIN MANAGEMENT | Age: 56
End: 2021-01-06

## 2021-01-06 ENCOUNTER — OFFICE VISIT (OUTPATIENT)
Dept: PAIN MANAGEMENT | Age: 56
End: 2021-01-06
Payer: MEDICAID

## 2021-01-06 VITALS
DIASTOLIC BLOOD PRESSURE: 82 MMHG | RESPIRATION RATE: 16 BRPM | WEIGHT: 240 LBS | BODY MASS INDEX: 45.31 KG/M2 | TEMPERATURE: 96.8 F | SYSTOLIC BLOOD PRESSURE: 134 MMHG | HEIGHT: 61 IN | HEART RATE: 82 BPM | OXYGEN SATURATION: 96 %

## 2021-01-06 DIAGNOSIS — M17.0 PRIMARY OSTEOARTHRITIS OF BOTH KNEES: ICD-10-CM

## 2021-01-06 DIAGNOSIS — E66.9 OBESITY, UNSPECIFIED CLASSIFICATION, UNSPECIFIED OBESITY TYPE, UNSPECIFIED WHETHER SERIOUS COMORBIDITY PRESENT: ICD-10-CM

## 2021-01-06 DIAGNOSIS — M51.36 DDD (DEGENERATIVE DISC DISEASE), LUMBAR: ICD-10-CM

## 2021-01-06 DIAGNOSIS — M54.2 CERVICALGIA: ICD-10-CM

## 2021-01-06 DIAGNOSIS — M47.817 LUMBOSACRAL SPONDYLOSIS WITHOUT MYELOPATHY: Primary | ICD-10-CM

## 2021-01-06 DIAGNOSIS — F54 PSYCHOLOGICAL FACTORS AFFECTING MEDICAL CONDITION: ICD-10-CM

## 2021-01-06 DIAGNOSIS — M75.41 SHOULDER IMPINGEMENT, RIGHT: ICD-10-CM

## 2021-01-06 DIAGNOSIS — G89.4 CHRONIC PAIN SYNDROME: ICD-10-CM

## 2021-01-06 DIAGNOSIS — F11.90 CHRONIC, CONTINUOUS USE OF OPIOIDS: ICD-10-CM

## 2021-01-06 PROCEDURE — 99213 OFFICE O/P EST LOW 20 MIN: CPT | Performed by: ANESTHESIOLOGY

## 2021-01-06 PROCEDURE — 1036F TOBACCO NON-USER: CPT | Performed by: ANESTHESIOLOGY

## 2021-01-06 PROCEDURE — G8427 DOCREV CUR MEDS BY ELIG CLIN: HCPCS | Performed by: ANESTHESIOLOGY

## 2021-01-06 PROCEDURE — 3017F COLORECTAL CA SCREEN DOC REV: CPT | Performed by: ANESTHESIOLOGY

## 2021-01-06 PROCEDURE — G8484 FLU IMMUNIZE NO ADMIN: HCPCS | Performed by: ANESTHESIOLOGY

## 2021-01-06 PROCEDURE — G8417 CALC BMI ABV UP PARAM F/U: HCPCS | Performed by: ANESTHESIOLOGY
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
## 2021-01-06 NOTE — PROGRESS NOTES
Do you currently have any of the following:    Fever: No  Headache:  No  Cough: No  Shortness of breath: No  Exposed to anyone with these symptoms: No                                                                                                                Shanel Quigley presents to the [address] [address] on 1/6/2021. Lakesha Odom is complaining of pain in lower back. . The pain is constant. The pain is described as aching, throbbing, shooting, stabbing and sharp. Pain is rated on her best day at a 6, on her worst day at a 8, and on average at a 6 on the VAS scale. She took her last dose of Norco last evening. Lula Alvarenga does not have issues with constipation. Any procedures since your last visit: Yes, with 100 % relief x 5 days and then it started coming back a little each day. She is  on NSAIDS and  is not on anticoagulation medications to include none and is managed by NA. Pacemaker or defibrilator: No Physician managing device is NA. Medication Contract and Consent for Opioid Use Documents Filed      No documents found                   /82   Pulse 82   Temp 96.8 °F (36 °C) (Infrared)   Resp 16   Ht 5' 1\" (1.549 m)   Wt 240 lb (108.9 kg)   SpO2 96%   BMI 45.35 kg/m²      No LMP recorded.  Patient is postmenopausal.

## 2021-01-07 ENCOUNTER — ANESTHESIA EVENT (OUTPATIENT)
Dept: OPERATING ROOM | Age: 56
End: 2021-01-07
Payer: MEDICAID
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
## 2021-01-08 ENCOUNTER — HOSPITAL ENCOUNTER (OUTPATIENT)
Age: 56
Setting detail: OUTPATIENT SURGERY
Discharge: HOME | End: 2021-01-08
Attending: ORTHOPAEDIC SURGERY | Admitting: ORTHOPAEDIC SURGERY
Payer: MEDICAID

## 2021-01-08 ENCOUNTER — ANESTHESIA (OUTPATIENT)
Dept: OPERATING ROOM | Age: 56
End: 2021-01-08
Payer: MEDICAID

## 2021-01-08 VITALS
TEMPERATURE: 97.5 F | RESPIRATION RATE: 24 BRPM | OXYGEN SATURATION: 94 % | DIASTOLIC BLOOD PRESSURE: 66 MMHG | SYSTOLIC BLOOD PRESSURE: 116 MMHG

## 2021-01-08 VITALS
WEIGHT: 248.2 LBS | BODY MASS INDEX: 46.86 KG/M2 | SYSTOLIC BLOOD PRESSURE: 140 MMHG | OXYGEN SATURATION: 88 % | TEMPERATURE: 98.6 F | RESPIRATION RATE: 14 BRPM | HEART RATE: 76 BPM | DIASTOLIC BLOOD PRESSURE: 76 MMHG | HEIGHT: 61 IN

## 2021-01-08 DIAGNOSIS — M75.41 SHOULDER IMPINGEMENT, RIGHT: ICD-10-CM

## 2021-01-08 DIAGNOSIS — S46.011A TRAUMATIC COMPLETE TEAR OF RIGHT ROTATOR CUFF, INITIAL ENCOUNTER: ICD-10-CM

## 2021-01-08 DIAGNOSIS — M75.121 COMPLETE TEAR OF RIGHT ROTATOR CUFF, UNSPECIFIED WHETHER TRAUMATIC: Primary | ICD-10-CM

## 2021-01-08 PROCEDURE — 76942 ECHO GUIDE FOR BIOPSY: CPT | Performed by: ANESTHESIOLOGY

## 2021-01-08 PROCEDURE — 6360000002 HC RX W HCPCS: Performed by: NURSE ANESTHETIST, CERTIFIED REGISTERED

## 2021-01-08 PROCEDURE — 6360000002 HC RX W HCPCS: Performed by: NURSE PRACTITIONER

## 2021-01-08 PROCEDURE — 2580000003 HC RX 258: Performed by: ANESTHESIOLOGY

## 2021-01-08 PROCEDURE — 3600000004 HC SURGERY LEVEL 4 BASE: Performed by: ORTHOPAEDIC SURGERY

## 2021-01-08 PROCEDURE — C1713 ANCHOR/SCREW BN/BN,TIS/BN: HCPCS | Performed by: ORTHOPAEDIC SURGERY

## 2021-01-08 PROCEDURE — 2500000003 HC RX 250 WO HCPCS: Performed by: NURSE ANESTHETIST, CERTIFIED REGISTERED

## 2021-01-08 PROCEDURE — 7100000010 HC PHASE II RECOVERY - FIRST 15 MIN: Performed by: ORTHOPAEDIC SURGERY

## 2021-01-08 PROCEDURE — 6370000000 HC RX 637 (ALT 250 FOR IP): Performed by: NURSE ANESTHETIST, CERTIFIED REGISTERED

## 2021-01-08 PROCEDURE — 6360000002 HC RX W HCPCS: Performed by: ANESTHESIOLOGY

## 2021-01-08 PROCEDURE — 6370000000 HC RX 637 (ALT 250 FOR IP): Performed by: ANESTHESIOLOGY

## 2021-01-08 PROCEDURE — 29826 SHO ARTHRS SRG DECOMPRESSION: CPT | Performed by: ORTHOPAEDIC SURGERY

## 2021-01-08 PROCEDURE — 3600000014 HC SURGERY LEVEL 4 ADDTL 15MIN: Performed by: ORTHOPAEDIC SURGERY

## 2021-01-08 PROCEDURE — 2580000003 HC RX 258: Performed by: ORTHOPAEDIC SURGERY

## 2021-01-08 PROCEDURE — 2720000010 HC SURG SUPPLY STERILE: Performed by: ORTHOPAEDIC SURGERY

## 2021-01-08 PROCEDURE — 2709999900 HC NON-CHARGEABLE SUPPLY: Performed by: ORTHOPAEDIC SURGERY

## 2021-01-08 PROCEDURE — 7100000001 HC PACU RECOVERY - ADDTL 15 MIN: Performed by: ORTHOPAEDIC SURGERY

## 2021-01-08 PROCEDURE — 7100000000 HC PACU RECOVERY - FIRST 15 MIN: Performed by: ORTHOPAEDIC SURGERY

## 2021-01-08 PROCEDURE — 3700000000 HC ANESTHESIA ATTENDED CARE: Performed by: ORTHOPAEDIC SURGERY

## 2021-01-08 PROCEDURE — 3700000001 HC ADD 15 MINUTES (ANESTHESIA): Performed by: ORTHOPAEDIC SURGERY

## 2021-01-08 PROCEDURE — 29823 SHO ARTHRS SRG XTNSV DBRDMT: CPT | Performed by: ORTHOPAEDIC SURGERY

## 2021-01-08 PROCEDURE — 29827 SHO ARTHRS SRG RT8TR CUF RPR: CPT | Performed by: ORTHOPAEDIC SURGERY

## 2021-01-08 PROCEDURE — 6360000002 HC RX W HCPCS: Performed by: ORTHOPAEDIC SURGERY

## 2021-01-08 PROCEDURE — 7100000011 HC PHASE II RECOVERY - ADDTL 15 MIN: Performed by: ORTHOPAEDIC SURGERY

## 2021-01-08 DEVICE — ANCHOR SUT L24.5MM DIA4.75MM BIOCOMPOSITE SELF PUNCHING: Type: IMPLANTABLE DEVICE | Site: SHOULDER | Status: FUNCTIONAL

## 2021-01-08 DEVICE — ANCHOR SUT 5.5X12MM WHT BLU AND WHITE/BLACK DBL LD W/ TWO: Type: IMPLANTABLE DEVICE | Site: SHOULDER | Status: FUNCTIONAL

## 2021-01-08 RX ORDER — SODIUM CHLORIDE, SODIUM LACTATE, POTASSIUM CHLORIDE, CALCIUM CHLORIDE 600; 310; 30; 20 MG/100ML; MG/100ML; MG/100ML; MG/100ML
INJECTION, SOLUTION INTRAVENOUS CONTINUOUS
Status: DISCONTINUED | OUTPATIENT
Start: 2021-01-08 | End: 2021-01-08 | Stop reason: HOSPADM

## 2021-01-08 RX ORDER — DEXAMETHASONE SODIUM PHOSPHATE 10 MG/ML
INJECTION, SOLUTION INTRAMUSCULAR; INTRAVENOUS PRN
Status: DISCONTINUED | OUTPATIENT
Start: 2021-01-08 | End: 2021-01-08 | Stop reason: SDUPTHER

## 2021-01-08 RX ORDER — DIPHENHYDRAMINE HYDROCHLORIDE 50 MG/ML
INJECTION INTRAMUSCULAR; INTRAVENOUS PRN
Status: DISCONTINUED | OUTPATIENT
Start: 2021-01-08 | End: 2021-01-08 | Stop reason: SDUPTHER

## 2021-01-08 RX ORDER — HYDRALAZINE HYDROCHLORIDE 20 MG/ML
5 INJECTION INTRAMUSCULAR; INTRAVENOUS EVERY 10 MIN PRN
Status: DISCONTINUED | OUTPATIENT
Start: 2021-01-08 | End: 2021-01-08 | Stop reason: HOSPADM

## 2021-01-08 RX ORDER — LABETALOL HYDROCHLORIDE 5 MG/ML
5 INJECTION, SOLUTION INTRAVENOUS EVERY 10 MIN PRN
Status: DISCONTINUED | OUTPATIENT
Start: 2021-01-08 | End: 2021-01-08 | Stop reason: HOSPADM

## 2021-01-08 RX ORDER — ALBUTEROL SULFATE 90 UG/1
AEROSOL, METERED RESPIRATORY (INHALATION) PRN
Status: DISCONTINUED | OUTPATIENT
Start: 2021-01-08 | End: 2021-01-08 | Stop reason: SDUPTHER

## 2021-01-08 RX ORDER — FENTANYL CITRATE 50 UG/ML
INJECTION, SOLUTION INTRAMUSCULAR; INTRAVENOUS PRN
Status: DISCONTINUED | OUTPATIENT
Start: 2021-01-08 | End: 2021-01-08 | Stop reason: SDUPTHER

## 2021-01-08 RX ORDER — PROPOFOL 10 MG/ML
INJECTION, EMULSION INTRAVENOUS PRN
Status: DISCONTINUED | OUTPATIENT
Start: 2021-01-08 | End: 2021-01-08 | Stop reason: SDUPTHER

## 2021-01-08 RX ORDER — EPHEDRINE SULFATE/0.9% NACL/PF 50 MG/5 ML
SYRINGE (ML) INTRAVENOUS PRN
Status: DISCONTINUED | OUTPATIENT
Start: 2021-01-08 | End: 2021-01-08 | Stop reason: SDUPTHER

## 2021-01-08 RX ORDER — MIDAZOLAM HYDROCHLORIDE 1 MG/ML
INJECTION INTRAMUSCULAR; INTRAVENOUS PRN
Status: DISCONTINUED | OUTPATIENT
Start: 2021-01-08 | End: 2021-01-08 | Stop reason: SDUPTHER

## 2021-01-08 RX ORDER — CEPHALEXIN 500 MG/1
500 CAPSULE ORAL 3 TIMES DAILY
Qty: 10 CAPSULE | Refills: 0 | Status: SHIPPED | OUTPATIENT
Start: 2021-01-08 | End: 2021-01-12

## 2021-01-08 RX ORDER — SUCCINYLCHOLINE/SOD CL,ISO/PF 200MG/10ML
SYRINGE (ML) INTRAVENOUS PRN
Status: DISCONTINUED | OUTPATIENT
Start: 2021-01-08 | End: 2021-01-08 | Stop reason: SDUPTHER

## 2021-01-08 RX ORDER — CEFAZOLIN SODIUM 2 G/50ML
2 SOLUTION INTRAVENOUS ONCE
Status: COMPLETED | OUTPATIENT
Start: 2021-01-08 | End: 2021-01-08

## 2021-01-08 RX ORDER — ROCURONIUM BROMIDE 10 MG/ML
INJECTION, SOLUTION INTRAVENOUS PRN
Status: DISCONTINUED | OUTPATIENT
Start: 2021-01-08 | End: 2021-01-08 | Stop reason: SDUPTHER

## 2021-01-08 RX ORDER — KETOROLAC TROMETHAMINE 30 MG/ML
INJECTION, SOLUTION INTRAMUSCULAR; INTRAVENOUS PRN
Status: DISCONTINUED | OUTPATIENT
Start: 2021-01-08 | End: 2021-01-08 | Stop reason: SDUPTHER

## 2021-01-08 RX ORDER — ONDANSETRON 2 MG/ML
INJECTION INTRAMUSCULAR; INTRAVENOUS PRN
Status: DISCONTINUED | OUTPATIENT
Start: 2021-01-08 | End: 2021-01-08 | Stop reason: SDUPTHER

## 2021-01-08 RX ORDER — ROPIVACAINE HYDROCHLORIDE 5 MG/ML
INJECTION, SOLUTION EPIDURAL; INFILTRATION; PERINEURAL
Status: COMPLETED | OUTPATIENT
Start: 2021-01-08 | End: 2021-01-08

## 2021-01-08 RX ORDER — PROMETHAZINE HYDROCHLORIDE 25 MG/ML
6.25 INJECTION, SOLUTION INTRAMUSCULAR; INTRAVENOUS
Status: DISCONTINUED | OUTPATIENT
Start: 2021-01-08 | End: 2021-01-08 | Stop reason: HOSPADM

## 2021-01-08 RX ORDER — OXYCODONE AND ACETAMINOPHEN 7.5; 325 MG/1; MG/1
1 TABLET ORAL EVERY 6 HOURS PRN
Qty: 28 TABLET | Refills: 0 | Status: SHIPPED | OUTPATIENT
Start: 2021-01-08 | End: 2021-01-22 | Stop reason: SDUPTHER

## 2021-01-08 RX ORDER — OXYCODONE HYDROCHLORIDE AND ACETAMINOPHEN 5; 325 MG/1; MG/1
1 TABLET ORAL
Status: COMPLETED | OUTPATIENT
Start: 2021-01-08 | End: 2021-01-08

## 2021-01-08 RX ORDER — MEPERIDINE HYDROCHLORIDE 25 MG/ML
12.5 INJECTION INTRAMUSCULAR; INTRAVENOUS; SUBCUTANEOUS EVERY 5 MIN PRN
Status: DISCONTINUED | OUTPATIENT
Start: 2021-01-08 | End: 2021-01-08 | Stop reason: HOSPADM

## 2021-01-08 RX ORDER — NEOSTIGMINE METHYLSULFATE 1 MG/ML
INJECTION, SOLUTION INTRAVENOUS PRN
Status: DISCONTINUED | OUTPATIENT
Start: 2021-01-08 | End: 2021-01-08 | Stop reason: SDUPTHER

## 2021-01-08 RX ORDER — GLYCOPYRROLATE 1 MG/5 ML
SYRINGE (ML) INTRAVENOUS PRN
Status: DISCONTINUED | OUTPATIENT
Start: 2021-01-08 | End: 2021-01-08 | Stop reason: SDUPTHER

## 2021-01-08 RX ORDER — LIDOCAINE HYDROCHLORIDE 20 MG/ML
INJECTION, SOLUTION INTRAVENOUS PRN
Status: DISCONTINUED | OUTPATIENT
Start: 2021-01-08 | End: 2021-01-08 | Stop reason: SDUPTHER

## 2021-01-08 RX ADMIN — Medication 0.6 MG: at 09:21

## 2021-01-08 RX ADMIN — Medication 160 MG: at 07:48

## 2021-01-08 RX ADMIN — ROCURONIUM BROMIDE 25 MG: 10 INJECTION, SOLUTION INTRAVENOUS at 08:05

## 2021-01-08 RX ADMIN — FENTANYL CITRATE 50 MCG: 50 INJECTION, SOLUTION INTRAMUSCULAR; INTRAVENOUS at 07:42

## 2021-01-08 RX ADMIN — PROPOFOL 200 MG: 10 INJECTION, EMULSION INTRAVENOUS at 07:48

## 2021-01-08 RX ADMIN — ROPIVACAINE HYDROCHLORIDE 20 ML: 5 INJECTION, SOLUTION EPIDURAL; INFILTRATION; PERINEURAL at 08:14

## 2021-01-08 RX ADMIN — OXYCODONE AND ACETAMINOPHEN 1 TABLET: 5; 325 TABLET ORAL at 10:41

## 2021-01-08 RX ADMIN — LIDOCAINE HYDROCHLORIDE 50 MG: 20 INJECTION, SOLUTION INTRAVENOUS at 07:48

## 2021-01-08 RX ADMIN — SODIUM CHLORIDE, POTASSIUM CHLORIDE, SODIUM LACTATE AND CALCIUM CHLORIDE: 600; 310; 30; 20 INJECTION, SOLUTION INTRAVENOUS at 08:58

## 2021-01-08 RX ADMIN — FENTANYL CITRATE 50 MCG: 50 INJECTION, SOLUTION INTRAMUSCULAR; INTRAVENOUS at 07:38

## 2021-01-08 RX ADMIN — CEFAZOLIN SODIUM 2 G: 2 SOLUTION INTRAVENOUS at 07:30

## 2021-01-08 RX ADMIN — FENTANYL CITRATE 50 MCG: 50 INJECTION, SOLUTION INTRAMUSCULAR; INTRAVENOUS at 07:34

## 2021-01-08 RX ADMIN — Medication 10 MG: at 08:36

## 2021-01-08 RX ADMIN — MIDAZOLAM 2 MG: 1 INJECTION INTRAMUSCULAR; INTRAVENOUS at 07:33

## 2021-01-08 RX ADMIN — Medication 3 MG: at 09:21

## 2021-01-08 RX ADMIN — DEXAMETHASONE SODIUM PHOSPHATE 10 MG: 10 INJECTION, SOLUTION INTRAMUSCULAR; INTRAVENOUS at 08:15

## 2021-01-08 RX ADMIN — FENTANYL CITRATE 50 MCG: 50 INJECTION, SOLUTION INTRAMUSCULAR; INTRAVENOUS at 07:48

## 2021-01-08 RX ADMIN — Medication 10 MG: at 08:46

## 2021-01-08 RX ADMIN — SODIUM CHLORIDE, POTASSIUM CHLORIDE, SODIUM LACTATE AND CALCIUM CHLORIDE: 600; 310; 30; 20 INJECTION, SOLUTION INTRAVENOUS at 07:11

## 2021-01-08 RX ADMIN — ALBUTEROL SULFATE 6 PUFF: 90 AEROSOL, METERED RESPIRATORY (INHALATION) at 09:17

## 2021-01-08 RX ADMIN — FENTANYL CITRATE 50 MCG: 50 INJECTION, SOLUTION INTRAMUSCULAR; INTRAVENOUS at 07:36

## 2021-01-08 RX ADMIN — KETOROLAC TROMETHAMINE 30 MG: 30 INJECTION, SOLUTION INTRAMUSCULAR; INTRAVENOUS at 09:18

## 2021-01-08 RX ADMIN — ONDANSETRON 4 MG: 2 INJECTION INTRAMUSCULAR; INTRAVENOUS at 08:56

## 2021-01-08 RX ADMIN — ROCURONIUM BROMIDE 5 MG: 10 INJECTION, SOLUTION INTRAVENOUS at 07:48

## 2021-01-08 RX ADMIN — DIPHENHYDRAMINE HYDROCHLORIDE 12.5 MG: 50 INJECTION, SOLUTION INTRAMUSCULAR; INTRAVENOUS at 08:56

## 2021-01-08 ASSESSMENT — PULMONARY FUNCTION TESTS
PIF_VALUE: 21
PIF_VALUE: 21
PIF_VALUE: 20
PIF_VALUE: 21
PIF_VALUE: 0
PIF_VALUE: 0
PIF_VALUE: 1
PIF_VALUE: 19
PIF_VALUE: 21
PIF_VALUE: 21
PIF_VALUE: 1
PIF_VALUE: 15
PIF_VALUE: 0
PIF_VALUE: 21
PIF_VALUE: 21
PIF_VALUE: 1
PIF_VALUE: 20
PIF_VALUE: 21
PIF_VALUE: 21
PIF_VALUE: 2
PIF_VALUE: 21
PIF_VALUE: 0
PIF_VALUE: 23
PIF_VALUE: 21
PIF_VALUE: 0
PIF_VALUE: 21
PIF_VALUE: 22
PIF_VALUE: 0
PIF_VALUE: 25
PIF_VALUE: 21
PIF_VALUE: 22
PIF_VALUE: 1
PIF_VALUE: 21
PIF_VALUE: 0
PIF_VALUE: 19
PIF_VALUE: 0
PIF_VALUE: 21
PIF_VALUE: 0
PIF_VALUE: 21
PIF_VALUE: 1
PIF_VALUE: 31
PIF_VALUE: 21
PIF_VALUE: 0
PIF_VALUE: 21
PIF_VALUE: 1
PIF_VALUE: 21
PIF_VALUE: 19
PIF_VALUE: 21
PIF_VALUE: 22
PIF_VALUE: 21
PIF_VALUE: 21
PIF_VALUE: 0

## 2021-01-08 ASSESSMENT — PAIN DESCRIPTION - LOCATION: LOCATION: SHOULDER

## 2021-01-08 ASSESSMENT — PAIN DESCRIPTION - DESCRIPTORS: DESCRIPTORS: ACHING

## 2021-01-08 ASSESSMENT — PAIN SCALES - GENERAL
PAINLEVEL_OUTOF10: 5
PAINLEVEL_OUTOF10: 0
PAINLEVEL_OUTOF10: 3

## 2021-01-08 ASSESSMENT — PAIN DESCRIPTION - PAIN TYPE: TYPE: SURGICAL PAIN
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
## 2021-01-08 NOTE — ANESTHESIA POSTPROCEDURE EVALUATION
Department of Anesthesiology  Postprocedure Note    Patient: Carlos Thibodeaux  MRN: [id number]  YOB: 1965  Date of evaluation: 1/8/2021  Time:  10:10 AM     Procedure Summary     Date: 01/08/21 Room / Location: [address] [place] [address] 01 / [facility]    Anesthesia Start: 4223 Anesthesia Stop: 8077    Procedure: RIGHT SHOULDER ARTHROSCOPY ROTATOR CUFF REPAIR SUBACROMIAL DECOMPRESSION AND DEBRIDEMENT ([address]) (Right ) Diagnosis: (RIGHT SHOULDER ROTATOR CUFF TEAR)    Surgeons: Lety Cha DO Responsible Provider: Cristopher Lynch MD    Anesthesia Type: general, regional ASA Status: 3          Anesthesia Type: general, regional    Shmuel Phase I: Shmuel Score: 10    Shmuel Phase II: Shmuel Score: 10    Last vitals: Reviewed and per EMR flowsheets.        Anesthesia Post Evaluation    Patient location during evaluation: PACU  Patient participation: complete - patient participated  Level of consciousness: awake and alert  Airway patency: patent  Nausea & Vomiting: no nausea and no vomiting  Complications: no  Cardiovascular status: hemodynamically stable  Respiratory status: acceptable  Hydration status: euvolemic

## 2021-01-08 NOTE — OP NOTE
Operative Note      Patient: Sabine Gamino  YOB: 1965  MRN: [id number]    Date of Procedure: 1/8/2021    Pre-Op Diagnosis: RIGHT SHOULDER ROTATOR CUFF TEAR    Post-Op Diagnosis: Same       Procedure(s):  RIGHT SHOULDER ARTHROSCOPY ROTATOR CUFF REPAIR SUBACROMIAL DECOMPRESSION AND DEBRIDEMENT ([address])    Surgeon(s): Jayesh Mondragon DO    Assistant:   Resident: Prem Byrne DO    Anesthesia: General    Estimated Blood Loss (mL): Minimal    Complications: None    Specimens:   * No specimens in log *    Implants:  * No implants in log *      Drains: * No LDAs found *    Findings: as above    Detailed Description of Procedure:   below    PREOPERATIVE DIAGNOSES: (1) right shoulder rotator cuff tear. (2)   Subacromial impingement syndrome . (3) labral /biceps tear  POSTOPERATIVE DIAGNOSES: (1) right shoulder rotator cuff tear. (2)   Subacromial impingement. (3) labral/biceps tear  OPERATION: (1)right shoulder arthroscopy with arthroscopic rotator cuff   repair. (2) Subacromial arch decompression. (3) labral/biceps tear  ANESTHESIA: General   Surgeon: marcial   Assistant:as above  ESTIMATED BLOOD LOSS: Minimal  COMPLICATIONS: None. OPERATIVE IMPLANTS:1 swivel lock anchor    Brief Hospital Course: Sabine Gamino is a patient known to Vahid Us DO's practice with persistent complaints of shoulder pain. shoulder pain has failed to be relieved by non-operative conservative measures, and has began affecting daily activities of living. After examination of the patient, review of the radiologic studies, and appropriate pre-operative risk assessment, Vahid Us DO recommended shoulder arthroscopy, which the patient was agreeable towards. Operative Procedure:   I positioned the patient in the lateral decubitus position on a   beanbag, hung 10 pounds traction from the  arm, prepped and draped the   arm in sterile fashion.  I outlined an incision along the posterior, lateral, and anterior acromion. I made an incision in the posterior side   of the shoulder, placed a blunt trocar within the glenohumeral joint and   carried out a diagnostic arthroscopy. Patient had a normal  glenohumeral   surface, glenoid and humeral head were in good. Patient didhave a tear involving the biceps, and did have a labral tear. The labrum and biceps were debrided. There was evidence of a full-thickness tear   from the undersurface looking up into the subacromial space. There was a partial thickness rotator cuff tear. I did debride the rotator cuff tendon. I then went to the subacromial space, I completed a complete subacromial   bursectomy removing all bursal tissue from the subacromial space. Using an ArthroCare   electrode, I outlined the acromial borders. I then carried out an anterior inferior   Acromioplasty, using a 5.0 barrel francesca, smoothing the hooked acromion to a flat type 1 contour. I then prepared the footprint of the greater tuberosity for implantation of the tendon down to nice bleeding bone. Tendon was debrided and cleaned up. The cuff tear measured about 1 cm in dimension and was full-thickness. I then choose to peform a single row repair. I then went to repair the rotator cuff, I placed an inverted horizontal mattress stitch using the scorpion suture passer. I grabbed the stitch out through the superior lateral portal where I made the anchor portal trough. 1 lateral row anchor lateral to the greater tuberosity was impacted into position. The cuff was reapproximated to its bed, had great fixation on   the cuff by pulling tension on the stitches and screwed the anchor into position. Thus reapproximating the tendon to its  bed. The cuff was nicely approximated to its bed and had no abnormalities. I did thoroughly irrigate the wound upon closure, I checked the cuff for stability using a hook probe. The tendon repair was snug and well approximated to its footprint.      I then removed all fluid from the shoulder joint and closed the incision with 4-0 Prolene in a horizontal mattress-type fashion. Sterile dressing was placed on the wound. Patient placed in a splint. The Patient tolerated the procedure well, woke up in the   recovery room without difficulty.        Electronically signed by Raeford Lennox, DO on 1/8/2021 at 7:26 AM

## 2021-01-08 NOTE — ANESTHESIA PROCEDURE NOTES
Peripheral Block    Patient location during procedure: pre-op  Staffing  Performed: anesthesiologist   Anesthesiologist: Gabriella Velasquez MD  Preanesthetic Checklist  Completed: patient identified, IV checked, site marked, risks and benefits discussed, surgical consent, monitors and equipment checked, pre-op evaluation, timeout performed, anesthesia consent given, oxygen available and patient being monitored  Peripheral Block  Patient position: supine  Prep: ChloraPrep  Patient monitoring: cardiac monitor, continuous pulse ox and IV access  Block type: Brachial plexus  Laterality: right  Injection technique: single-shot  Guidance: ultrasound guided  Interscalene  Provider prep: mask and sterile gloves  Needle  Needle type: combined needle/nerve stimulator   Needle localization: ultrasound guidance  Assessment  Injection assessment: negative aspiration for heme, no paresthesia on injection and local visualized surrounding nerve on ultrasound  Paresthesia pain: immediately resolved  Slow fractionated injection: yes  Hemodynamics: stable  Medications Administered  Ropivacaine (NAROPIN) injection 0.5%, 20 mL  Reason for block: post-op pain management

## 2021-01-14 ENCOUNTER — TELEPHONE (OUTPATIENT)
Dept: BARIATRICS/WEIGHT MGMT | Age: 56
End: 2021-01-14

## 2021-01-19 ENCOUNTER — HOSPITAL ENCOUNTER (EMERGENCY)
Age: 56
Discharge: HOME | End: 2021-01-20
Attending: EMERGENCY MEDICINE
Payer: MEDICAID

## 2021-01-19 DIAGNOSIS — J45.901 MODERATE ASTHMA WITH EXACERBATION, UNSPECIFIED WHETHER PERSISTENT: ICD-10-CM

## 2021-01-19 DIAGNOSIS — R06.02 SHORTNESS OF BREATH: Primary | ICD-10-CM

## 2021-01-19 DIAGNOSIS — Z98.890 HISTORY OF REPAIR OF RIGHT ROTATOR CUFF: ICD-10-CM

## 2021-01-19 PROCEDURE — 96374 THER/PROPH/DIAG INJ IV PUSH: CPT

## 2021-01-19 PROCEDURE — U0003 INFECTIOUS AGENT DETECTION BY NUCLEIC ACID (DNA OR RNA); SEVERE ACUTE RESPIRATORY SYNDROME CORONAVIRUS 2 (SARS-COV-2) (CORONAVIRUS DISEASE [COVID-19]), AMPLIFIED PROBE TECHNIQUE, MAKING USE OF HIGH THROUGHPUT TECHNOLOGIES AS DESCRIBED BY CMS-2020-01-R: HCPCS

## 2021-01-19 PROCEDURE — U0002 COVID-19 LAB TEST NON-CDC: HCPCS

## 2021-01-19 PROCEDURE — 99284 EMERGENCY DEPT VISIT MOD MDM: CPT

## 2021-01-19 RX ORDER — METHYLPREDNISOLONE SODIUM SUCCINATE 125 MG/2ML
125 INJECTION, POWDER, LYOPHILIZED, FOR SOLUTION INTRAMUSCULAR; INTRAVENOUS ONCE
Status: COMPLETED | OUTPATIENT
Start: 2021-01-19 | End: 2021-01-20

## 2021-01-19 RX ORDER — IPRATROPIUM BROMIDE AND ALBUTEROL SULFATE 2.5; .5 MG/3ML; MG/3ML
3 SOLUTION RESPIRATORY (INHALATION) ONCE
Status: COMPLETED | OUTPATIENT
Start: 2021-01-19 | End: 2021-01-20

## 2021-01-20 ENCOUNTER — APPOINTMENT (OUTPATIENT)
Dept: ULTRASOUND IMAGING | Age: 56
End: 2021-01-20
Payer: MEDICAID

## 2021-01-20 ENCOUNTER — APPOINTMENT (OUTPATIENT)
Dept: CT IMAGING | Age: 56
End: 2021-01-20
Payer: MEDICAID

## 2021-01-20 VITALS
SYSTOLIC BLOOD PRESSURE: 111 MMHG | HEIGHT: 61 IN | HEART RATE: 83 BPM | RESPIRATION RATE: 20 BRPM | BODY MASS INDEX: 48.33 KG/M2 | DIASTOLIC BLOOD PRESSURE: 61 MMHG | OXYGEN SATURATION: 94 % | TEMPERATURE: 98 F | WEIGHT: 256 LBS

## 2021-01-20 DIAGNOSIS — M75.121 COMPLETE TEAR OF RIGHT ROTATOR CUFF, UNSPECIFIED WHETHER TRAUMATIC: Primary | ICD-10-CM

## 2021-01-20 LAB
ALBUMIN SERPL-MCNC: 4.1 G/DL (ref 3.5–5.2)
ALP BLD-CCNC: 97 U/L (ref 35–104)
ALT SERPL-CCNC: 22 U/L (ref 0–32)
ANION GAP SERPL CALCULATED.3IONS-SCNC: 11 MMOL/L (ref 7–16)
AST SERPL-CCNC: 16 U/L (ref 0–31)
BASOPHILS ABSOLUTE: 0.12 E9/L (ref 0–0.2)
BASOPHILS RELATIVE PERCENT: 0.8 % (ref 0–2)
BILIRUB SERPL-MCNC: 0.5 MG/DL (ref 0–1.2)
BILIRUBIN URINE: ABNORMAL
BLOOD, URINE: NEGATIVE
BUN BLDV-MCNC: 24 MG/DL (ref 6–20)
CALCIUM SERPL-MCNC: 9.2 MG/DL (ref 8.6–10.2)
CHLORIDE BLD-SCNC: 100 MMOL/L (ref 98–107)
CLARITY: CLEAR
CO2: 27 MMOL/L (ref 22–29)
COLOR: YELLOW
CREAT SERPL-MCNC: 1 MG/DL (ref 0.5–1)
EKG ATRIAL RATE: 82 BPM
EKG P AXIS: 13 DEGREES
EKG P-R INTERVAL: 152 MS
EKG Q-T INTERVAL: 378 MS
EKG QRS DURATION: 78 MS
EKG QTC CALCULATION (BAZETT): 441 MS
EKG R AXIS: 31 DEGREES
EKG T AXIS: 48 DEGREES
EKG VENTRICULAR RATE: 82 BPM
EOSINOPHILS ABSOLUTE: 0.21 E9/L (ref 0.05–0.5)
EOSINOPHILS RELATIVE PERCENT: 1.5 % (ref 0–6)
GFR AFRICAN AMERICAN: >60
GFR NON-AFRICAN AMERICAN: 57 ML/MIN/1.73
GLUCOSE BLD-MCNC: 132 MG/DL (ref 74–99)
GLUCOSE URINE: NEGATIVE MG/DL
HCT VFR BLD CALC: 38.1 % (ref 34–48)
HEMOGLOBIN: 12.5 G/DL (ref 11.5–15.5)
IMMATURE GRANULOCYTES #: 0.23 E9/L
IMMATURE GRANULOCYTES %: 1.6 % (ref 0–5)
KETONES, URINE: NEGATIVE MG/DL
LACTIC ACID: 1.8 MMOL/L (ref 0.5–2.2)
LEUKOCYTE ESTERASE, URINE: NEGATIVE
LYMPHOCYTES ABSOLUTE: 2.78 E9/L (ref 1.5–4)
LYMPHOCYTES RELATIVE PERCENT: 19.6 % (ref 20–42)
MCH RBC QN AUTO: 30.3 PG (ref 26–35)
MCHC RBC AUTO-ENTMCNC: 32.8 % (ref 32–34.5)
MCV RBC AUTO: 92.3 FL (ref 80–99.9)
MONOCYTES ABSOLUTE: 1.24 E9/L (ref 0.1–0.95)
MONOCYTES RELATIVE PERCENT: 8.7 % (ref 2–12)
NEUTROPHILS ABSOLUTE: 9.61 E9/L (ref 1.8–7.3)
NEUTROPHILS RELATIVE PERCENT: 67.8 % (ref 43–80)
NITRITE, URINE: NEGATIVE
PDW BLD-RTO: 14.9 FL (ref 11.5–15)
PH UA: 6 (ref 5–9)
PLATELET # BLD: 306 E9/L (ref 130–450)
PMV BLD AUTO: 11.2 FL (ref 7–12)
POTASSIUM SERPL-SCNC: 4.1 MMOL/L (ref 3.5–5)
PRO-BNP: 517 PG/ML (ref 0–125)
PROTEIN UA: NEGATIVE MG/DL
RBC # BLD: 4.13 E12/L (ref 3.5–5.5)
SARS-COV-2, NAAT: NOT DETECTED
SARS-COV-2, PCR: NORMAL
SODIUM BLD-SCNC: 138 MMOL/L (ref 132–146)
SPECIFIC GRAVITY UA: 1.01 (ref 1–1.03)
TOTAL PROTEIN: 7 G/DL (ref 6.4–8.3)
TROPONIN: <0.01 NG/ML (ref 0–0.03)
UROBILINOGEN, URINE: 0.2 E.U./DL
WBC # BLD: 14.2 E9/L (ref 4.5–11.5)

## 2021-01-20 PROCEDURE — 84484 ASSAY OF TROPONIN QUANT: CPT

## 2021-01-20 PROCEDURE — 6360000004 HC RX CONTRAST MEDICATION: Performed by: RADIOLOGY

## 2021-01-20 PROCEDURE — 80053 COMPREHEN METABOLIC PANEL: CPT

## 2021-01-20 PROCEDURE — 85025 COMPLETE CBC W/AUTO DIFF WBC: CPT

## 2021-01-20 PROCEDURE — 94640 AIRWAY INHALATION TREATMENT: CPT

## 2021-01-20 PROCEDURE — 93010 ELECTROCARDIOGRAM REPORT: CPT | Performed by: INTERNAL MEDICINE

## 2021-01-20 PROCEDURE — 6360000002 HC RX W HCPCS: Performed by: PHYSICIAN ASSISTANT

## 2021-01-20 PROCEDURE — 6370000000 HC RX 637 (ALT 250 FOR IP): Performed by: PHYSICIAN ASSISTANT

## 2021-01-20 PROCEDURE — 93970 EXTREMITY STUDY: CPT

## 2021-01-20 PROCEDURE — 71275 CT ANGIOGRAPHY CHEST: CPT

## 2021-01-20 PROCEDURE — 83880 ASSAY OF NATRIURETIC PEPTIDE: CPT

## 2021-01-20 PROCEDURE — 93005 ELECTROCARDIOGRAM TRACING: CPT | Performed by: PHYSICIAN ASSISTANT

## 2021-01-20 PROCEDURE — 81003 URINALYSIS AUTO W/O SCOPE: CPT

## 2021-01-20 PROCEDURE — 83605 ASSAY OF LACTIC ACID: CPT

## 2021-01-20 RX ADMIN — IOPAMIDOL 80 ML: 755 INJECTION, SOLUTION INTRAVENOUS at 02:36

## 2021-01-20 RX ADMIN — IPRATROPIUM BROMIDE AND ALBUTEROL SULFATE 3 AMPULE: .5; 3 SOLUTION RESPIRATORY (INHALATION) at 00:15

## 2021-01-20 RX ADMIN — METHYLPREDNISOLONE SODIUM SUCCINATE 125 MG: 125 INJECTION, POWDER, FOR SOLUTION INTRAMUSCULAR; INTRAVENOUS at 01:20

## 2021-01-20 NOTE — ED NOTES
Patient transported to [address] [place] [address], [address] [place] [address]  01/20/21 299 Vijaya Maguire RN  01/20/21 6323

## 2021-01-20 NOTE — ED PROVIDER NOTES
ED Attending  CC: No                                                                                                                                        Department of Emergency Medicine   ED  Provider Note  Admit Date/RoomTime: 1/19/2021 11:08 PM  ED Room: 13/13        HPI:  1/19/21,   Time: 11:34 PM MIRIAM Dupree is a 54 y.o. female presenting to the ED for SOB/CP, beginning this evening. The complaint has been persistent, moderate in severity, and worsened by nothing. The patient is a 54-year-old obese woman with known history of asthma who had rotator cuff surgery on January 8 by Dr. Narendra Durbin. She states that she is actually been doing quite well since the surgery until this evening. She reports an exacerbation of her asthma with increased cough and wheezing. This evening she started noticing that her legs were more swollen and that her toes looked discolored. She states that she was having some tightness across her chest and increased shortness of breath. The patient is on inhalers for her asthma but does not have any nebs at home. She denies any history of prior blood clots. The patient states that she has been up and about and fairly active since the surgery. Her wounds are intact. There is been no drainage or redness. Denies fever or chills. Denies fever, chills or vomiting. No known exposure to COVID-19.      Denies cardiac history of MI or CAD        ROS:     Constitutional: Negative for fever and chills  HENT: Negative for ear pain, sore throat and sinus pressure  Eyes: Negative for pain, discharge and redness  Respiratory: See HPI  Cardiovascular:  See HPI  Gastrointestinal:  See HPI  Genitourinary: Negative for dysuria and frequency  Musculoskeletal: Negative for back pain and arthralgia  Skin: Negative for rash and wound  Neurological: Negative for weakness and headaches  Hematological: Negative for adenopathy    All other systems reviewed and are negative      -------------------------------- PAST HISTORY ----------------------------------  Past Medical History:  has a past medical history of Anxiety, Arthritis, Asthma, Chronic headaches, Chronic pain, Depression, GERD (gastroesophageal reflux disease), Hip pain, History of cardiovascular stress test, Hypertension, Incontinence in female, Irritable bowel syndrome, Neuromuscular disorder ([facility] [address] Neuropathy, Obesity, and Prolonged emergence from general anesthesia. Past Surgical History:  has a past surgical history that includes Cholecystectomy; bone graft; Dilation and curettage of uterus; Foot surgery; cyst removal; fracture surgery; Colonoscopy; Upper gastrointestinal endoscopy; Endoscopy, colon, diagnostic (02/09/2017); bronchoscopy (N/A, 03/27/2018); bronchoscopy (03/27/2018); Nerve Block (Bilateral, 12/08/2020); Nerve Block (Bilateral, 12/08/2020); Shoulder arthroscopy (Right, 01/08/2021); and Shoulder arthroscopy (Right, 1/8/2021). Social History:  reports that she has never smoked. She has never used smokeless tobacco. She reports current alcohol use. She reports that she does not use drugs. Family History: family history includes Cancer in her father; High Blood Pressure in her brother, maternal grandfather, and mother; Other in her mother. The patients home medications have been reviewed.     Allergies: Cymbalta [duloxetine hcl], Dexlansoprazole, Diovan [valsartan], Ditropan [oxybutynin], Lunesta [eszopiclone], Molds & smuts, Norvasc [amlodipine besylate], Sular [nisoldipine er], [place] tape], Dextromethorphan polistirex er, Levaquin [levofloxacin], Other, Questran [cholestyramine], and Yellow dyes (non-tartrazine)    --------------------------------- RESULTS ------------------------------------------  All laboratory and radiology results have been personally reviewed by myself   LABS:  Results for orders placed or performed during the hospital encounter of 01/19/21   CBC Auto Differential   Result Value Ref Range    WBC 14.2 (H) 4.5 - 11.5 E9/L    RBC 4.13 3.50 - 5.50 E12/L    Hemoglobin 12.5 11.5 - 15.5 g/dL    Hematocrit 38.1 34.0 - 48.0 %    MCV 92.3 80.0 - 99.9 fL    MCH 30.3 26.0 - 35.0 pg    MCHC 32.8 32.0 - 34.5 %    RDW 14.9 11.5 - 15.0 fL    Platelets 601 459 - 821 E9/L    MPV 11.2 7.0 - 12.0 fL    Neutrophils % 67.8 43.0 - 80.0 %    Immature Granulocytes % 1.6 0.0 - 5.0 %    Lymphocytes % 19.6 (L) 20.0 - 42.0 %    Monocytes % 8.7 2.0 - 12.0 %    Eosinophils % 1.5 0.0 - 6.0 %    Basophils % 0.8 0.0 - 2.0 %    Neutrophils Absolute 9.61 (H) 1.80 - 7.30 E9/L    Immature Granulocytes # 0.23 E9/L    Lymphocytes Absolute 2.78 1.50 - 4.00 E9/L    Monocytes Absolute 1.24 (H) 0.10 - 0.95 E9/L    Eosinophils Absolute 0.21 0.05 - 0.50 E9/L    Basophils Absolute 0.12 0.00 - 0.20 E9/L   Comprehensive Metabolic Panel   Result Value Ref Range    Sodium 138 132 - 146 mmol/L    Potassium 4.1 3.5 - 5.0 mmol/L    Chloride 100 98 - 107 mmol/L    CO2 27 22 - 29 mmol/L    Anion Gap 11 7 - 16 mmol/L    Glucose 132 (H) 74 - 99 mg/dL    BUN 24 (H) 6 - 20 mg/dL    CREATININE 1.0 0.5 - 1.0 mg/dL    GFR Non-African American 57 >=60 mL/min/1.73    GFR African American >60     Calcium 9.2 8.6 - 10.2 mg/dL    Total Protein 7.0 6.4 - 8.3 g/dL    Alb 4.1 3.5 - 5.2 g/dL    Total Bilirubin 0.5 0.0 - 1.2 mg/dL    Alkaline Phosphatase 97 35 - 104 U/L    ALT 22 0 - 32 U/L    AST 16 0 - 31 U/L   Troponin   Result Value Ref Range    Troponin <0.01 0.00 - 0.03 ng/mL   Lactic Acid, Plasma   Result Value Ref Range    Lactic Acid 1.8 0.5 - 2.2 mmol/L   Urinalysis   Result Value Ref Range    Color, UA Yellow Straw/Yellow    Clarity, UA Clear Clear    Glucose, Ur Negative Negative mg/dL    Bilirubin Urine MODERATE (A) Negative    Ketones, Urine Negative Negative mg/dL    Specific Gravity, UA 1.010 1.005 - 1.030    Blood, Urine Negative Negative    pH, UA 6.0 5.0 - 9.0    Protein, UA Negative Negative mg/dL    Urobilinogen, Urine 0.2 <2.0 E.U./dL    Nitrite, Urine Negative Negative    Leukocyte Esterase, Urine Negative Negative   COVID-19   Result Value Ref Range    SARS-CoV-2, NAAT Not Detected Not Detected    SARS-CoV-2, PCR Not performed Not Detected   Brain Natriuretic Peptide   Result Value Ref Range    Pro- (H) 0 - 125 pg/mL     EKG:  Normal sinus rhythm with ventricular rate of 82. LA interval, QRS duration and QT interval within normal range. Normal axis. No ST segment abnormalities to suggest acute ischemia. RADIOLOGY:  Interpreted by Radiologist.  CTA CHEST W CONTRAST   Final Result   No evidence of pulmonary embolism or acute pulmonary abnormality, other than   trace dependent atelectasis in the lung bases. Short-term follow-up if   symptoms persist.      US DUP LOWER EXTREMITIES BILATERAL VENOUS   Final Result   No evidence of DVT in either lower extremity.          ----------------- NURSING NOTES AND VITALS REVIEWED ---------------   The nursing notes within the ED encounter and vital signs as below have been reviewed. /61   Pulse 83   Temp 98 °F (36.7 °C) (Oral)   Resp 20   Ht 5' 1\" (1.549 m)   Wt 256 lb (116.1 kg)   SpO2 94%   BMI 48.37 kg/m²   Oxygen Saturation Interpretation: Normal      --------------------------------PHYSICAL EXAM------------------------------------      Constitutional/General: Alert and oriented x3, obese. NAD  Head: NC/AT  Eyes: PERRL, EOMI  Mouth: Oropharynx clear, handling secretions, no trismus  Neck: Supple, full ROM, no meningeal signs  Pulmonary: Lungs decreased bilaterally with audible expiratory wheezing noted. No rales, or rhonchi. Not in respiratory distress  Cardiovascular:  Regular rate and rhythm, no murmurs, gallops, or rubs. 2+ distal pulses  Abdomen: Soft, + BS. No distension. Nontender. No palpable rigidity, rebound or guarding  Extremities: Moves all extremities x 4. Trace pitting edema bilaterally. No visible trauma or rash.   Both calves and mildly tender to palpation. Pulses intact. Feet are cool but equal bilaterally. Right shoulder wounds intact. No drainage or erythema. Mild bruising seen around wounds. Skin:  See above  Neurologic: GCS 15,  Intact. No focal deficits  Psych: Normal Affect      ------------------------ ED COURSE/MEDICAL DECISION MAKING----------------------  Medications   ipratropium-albuterol (DUONEB) nebulizer solution 3 ampule (3 ampules Inhalation Given 1/20/21 0015)   methylPREDNISolone sodium (SOLU-MEDROL) injection 125 mg (125 mg Intravenous Given 1/20/21 0120)   iopamidol (ISOVUE-370) 76 % injection 80 mL (80 mLs Intravenous Given 1/20/21 0236)         Medical Decision Making:    Pt is feeling much better following IV solumedrol and Duoneb treatments. Will discharge for outpatient follow up. Counseling: The emergency provider has spoken with the patient and discussed todays results, in addition to providing specific details for the plan of care and counseling regarding the diagnosis and prognosis. Questions are answered at this time and they are agreeable with the plan.      ------------------------ IMPRESSION AND DISPOSITION -------------------------------    IMPRESSION  1. Shortness of breath    2. Moderate asthma with exacerbation, unspecified whether persistent    3. History of repair of right rotator cuff        DISPOSITION  Disposition: Discharge to home  Patient condition is stable            ATTENDING PROVIDER ATTESTATION:     I have personally performed and/or participated in the history, exam, medical decision making, and procedures and agree with all pertinent clinical information. I have also reviewed and agree with the past medical, family and social history unless otherwise noted. My findings/Plan: Heart RRR. Bilateral expiratory wheezes. Abdomen soft, nontender. Bowel sounds normal. Trace edema bilateral lower extremities. No calf swelling or tenderness. Supportive care.  Discharge for outpatient follow up.            De Brown, DO  01/20/21 [facility], DO  01/20/21 [id number]

## 2021-01-20 NOTE — ED NOTES
To go to ultrasound after breathing treatment/medication. Ultrasound aware.    Gregory Vitale, RADHA  01/19/21 Corrine Keita, RN  01/19/21 [id number]

## 2021-01-22 ENCOUNTER — OFFICE VISIT (OUTPATIENT)
Dept: ORTHOPEDIC SURGERY | Age: 56
End: 2021-01-22

## 2021-01-22 VITALS — HEIGHT: 61 IN | WEIGHT: 256 LBS | BODY MASS INDEX: 48.33 KG/M2 | TEMPERATURE: 98 F

## 2021-01-22 DIAGNOSIS — M75.41 SHOULDER IMPINGEMENT, RIGHT: ICD-10-CM

## 2021-01-22 DIAGNOSIS — S46.011A TRAUMATIC COMPLETE TEAR OF RIGHT ROTATOR CUFF, INITIAL ENCOUNTER: ICD-10-CM

## 2021-01-22 DIAGNOSIS — S43.431A TEAR OF RIGHT GLENOID LABRUM, INITIAL ENCOUNTER: ICD-10-CM

## 2021-01-22 DIAGNOSIS — M75.121 COMPLETE TEAR OF RIGHT ROTATOR CUFF, UNSPECIFIED WHETHER TRAUMATIC: Primary | ICD-10-CM

## 2021-01-22 PROCEDURE — 99024 POSTOP FOLLOW-UP VISIT: CPT | Performed by: NURSE PRACTITIONER

## 2021-01-22 RX ORDER — OXYCODONE AND ACETAMINOPHEN 7.5; 325 MG/1; MG/1
1 TABLET ORAL EVERY 6 HOURS PRN
Qty: 28 TABLET | Refills: 0 | Status: SHIPPED
Start: 2021-01-22 | End: 2021-02-18 | Stop reason: SDUPTHER

## 2021-02-18 ENCOUNTER — OFFICE VISIT (OUTPATIENT)
Dept: ORTHOPEDIC SURGERY | Age: 56
End: 2021-02-18

## 2021-02-18 VITALS — WEIGHT: 230.6 LBS | BODY MASS INDEX: 43.54 KG/M2 | HEIGHT: 61 IN

## 2021-02-18 DIAGNOSIS — S43.431A TEAR OF RIGHT GLENOID LABRUM, INITIAL ENCOUNTER: ICD-10-CM

## 2021-02-18 DIAGNOSIS — M75.121 COMPLETE TEAR OF RIGHT ROTATOR CUFF, UNSPECIFIED WHETHER TRAUMATIC: Primary | ICD-10-CM

## 2021-02-18 DIAGNOSIS — M75.41 SHOULDER IMPINGEMENT, RIGHT: ICD-10-CM

## 2021-02-18 DIAGNOSIS — S46.011A TRAUMATIC COMPLETE TEAR OF RIGHT ROTATOR CUFF, INITIAL ENCOUNTER: ICD-10-CM

## 2021-02-18 PROCEDURE — 99024 POSTOP FOLLOW-UP VISIT: CPT | Performed by: ORTHOPAEDIC SURGERY

## 2021-02-18 RX ORDER — LEVOTHYROXINE SODIUM 0.03 MG/1
25 TABLET ORAL DAILY
COMMUNITY
End: 2022-07-05 | Stop reason: SDUPTHER

## 2021-02-18 RX ORDER — OXYCODONE AND ACETAMINOPHEN 7.5; 325 MG/1; MG/1
1 TABLET ORAL EVERY 6 HOURS PRN
Qty: 28 TABLET | Refills: 0 | Status: SHIPPED | OUTPATIENT
Start: 2021-02-18 | End: 2021-02-25

## 2021-02-18 RX ORDER — FUROSEMIDE 20 MG/1
20 TABLET ORAL DAILY
COMMUNITY
End: 2022-01-14 | Stop reason: ALTCHOICE

## 2021-02-18 NOTE — PROGRESS NOTES
Michael Fragoso is here for post op visit after RTC repair and shoulder arthroscopy. The patient is post op week 6. The patient is  doing well. Physical exam:  The wounds are clean, dry. Range of motion: diminished range of motion. She has intact motor and sensory functions, grossly intact in the median, ulnar, radial, musculocutaneous, and axillary nerve distributions. She has bounding pulses in the wrist.  Capillary refill is less than 2 seconds in all digits. Impression:  {  Encounter Diagnoses   Name Primary?  Complete tear of right rotator cuff, unspecified whether traumatic Yes    Shoulder impingement, right     Tear of right glenoid labrum, initial encounter        Plan:    The patient will now D/C the sling. She will continue range of motion at the elbow, wrist and hand and initiate physical therapy. She will continue to use analgesics to control the pain.       I will see them back in 2 months

## 2021-02-19 ENCOUNTER — HOSPITAL ENCOUNTER (OUTPATIENT)
Dept: NON INVASIVE DIAGNOSTICS | Age: 56
Discharge: HOME | End: 2021-02-19
Payer: MEDICAID

## 2021-02-19 LAB
LV EF: 58 %
LVEF MODALITY: NORMAL

## 2021-02-19 PROCEDURE — C8929 TTE W OR WO FOL WCON,DOPPLER: HCPCS

## 2021-02-19 PROCEDURE — 6360000004 HC RX CONTRAST MEDICATION: Performed by: INTERNAL MEDICINE

## 2021-02-19 RX ADMIN — PERFLUTREN 2.2 MG: 6.52 INJECTION, SUSPENSION INTRAVENOUS at 08:54

## 2021-03-09 ENCOUNTER — OFFICE VISIT (OUTPATIENT)
Dept: PAIN MANAGEMENT | Age: 56
End: 2021-03-09
Payer: MEDICAID

## 2021-03-09 ENCOUNTER — PREP FOR PROCEDURE (OUTPATIENT)
Dept: PAIN MANAGEMENT | Age: 56
End: 2021-03-09

## 2021-03-09 VITALS
RESPIRATION RATE: 16 BRPM | SYSTOLIC BLOOD PRESSURE: 108 MMHG | DIASTOLIC BLOOD PRESSURE: 82 MMHG | HEART RATE: 84 BPM | TEMPERATURE: 97.3 F

## 2021-03-09 DIAGNOSIS — E66.9 OBESITY, UNSPECIFIED CLASSIFICATION, UNSPECIFIED OBESITY TYPE, UNSPECIFIED WHETHER SERIOUS COMORBIDITY PRESENT: ICD-10-CM

## 2021-03-09 DIAGNOSIS — M47.817 LUMBOSACRAL SPONDYLOSIS WITHOUT MYELOPATHY: Primary | ICD-10-CM

## 2021-03-09 DIAGNOSIS — M54.2 CERVICALGIA: ICD-10-CM

## 2021-03-09 DIAGNOSIS — F54 PSYCHOLOGICAL FACTORS AFFECTING MEDICAL CONDITION: ICD-10-CM

## 2021-03-09 DIAGNOSIS — M51.36 DDD (DEGENERATIVE DISC DISEASE), LUMBAR: ICD-10-CM

## 2021-03-09 DIAGNOSIS — M17.0 PRIMARY OSTEOARTHRITIS OF BOTH KNEES: ICD-10-CM

## 2021-03-09 PROCEDURE — 99213 OFFICE O/P EST LOW 20 MIN: CPT

## 2021-03-09 PROCEDURE — 99213 OFFICE O/P EST LOW 20 MIN: CPT | Performed by: ANESTHESIOLOGY

## 2021-03-09 NOTE — PROGRESS NOTES
Do you currently have any of the following:    Fever: No  Headache:  No  Cough: No  Shortness of breath: No  Exposed to anyone with these symptoms: No                                                                                                                Erika Dolan presents to the [facility] on 3/9/2021. Kyra Dickens is complaining of pain in her lower back, neck amd right shoulder. The pain is constant. The pain is described as sharp. Pain is rated on her best day at a 7, on her worst day at a 8, and on average at a 7 on the VAS scale. She took her last dose of Percocet last night. Kyra Dickens does not have issues with constipation. Any procedures since your last visit: No.    She is  on NSAIDS and  is not on anticoagulation medications. Pacemaker or defibrillator: No Physician managing device is N/A.    /82   Pulse 84   Temp 97.3 °F (36.3 °C)   Resp 16      No LMP recorded.  Patient is postmenopausal.
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
## 2021-03-30 ENCOUNTER — HOSPITAL ENCOUNTER (OUTPATIENT)
Dept: OPERATING ROOM | Age: 56
Setting detail: OUTPATIENT SURGERY
Discharge: HOME | End: 2021-03-30
Attending: ANESTHESIOLOGY
Payer: MEDICAID

## 2021-03-30 ENCOUNTER — HOSPITAL ENCOUNTER (OUTPATIENT)
Age: 56
Setting detail: OUTPATIENT SURGERY
Discharge: HOME | End: 2021-03-30
Attending: ANESTHESIOLOGY | Admitting: ANESTHESIOLOGY
Payer: MEDICAID

## 2021-03-30 VITALS
OXYGEN SATURATION: 93 % | SYSTOLIC BLOOD PRESSURE: 112 MMHG | TEMPERATURE: 97.7 F | HEIGHT: 61 IN | BODY MASS INDEX: 46.26 KG/M2 | HEART RATE: 74 BPM | WEIGHT: 245 LBS | RESPIRATION RATE: 14 BRPM | DIASTOLIC BLOOD PRESSURE: 79 MMHG

## 2021-03-30 DIAGNOSIS — M47.896 OTHER OSTEOARTHRITIS OF SPINE, LUMBAR REGION: ICD-10-CM

## 2021-03-30 PROCEDURE — 7100000010 HC PHASE II RECOVERY - FIRST 15 MIN: Performed by: ANESTHESIOLOGY

## 2021-03-30 PROCEDURE — 3209999900 FLUORO FOR SURGICAL PROCEDURES

## 2021-03-30 PROCEDURE — 7100000011 HC PHASE II RECOVERY - ADDTL 15 MIN: Performed by: ANESTHESIOLOGY

## 2021-03-30 PROCEDURE — 6360000002 HC RX W HCPCS: Performed by: ANESTHESIOLOGY

## 2021-03-30 PROCEDURE — 2709999900 HC NON-CHARGEABLE SUPPLY: Performed by: ANESTHESIOLOGY

## 2021-03-30 PROCEDURE — 64494 INJ PARAVERT F JNT L/S 2 LEV: CPT | Performed by: ANESTHESIOLOGY

## 2021-03-30 PROCEDURE — 3600000005 HC SURGERY LEVEL 5 BASE: Performed by: ANESTHESIOLOGY

## 2021-03-30 PROCEDURE — 64493 INJ PARAVERT F JNT L/S 1 LEV: CPT | Performed by: ANESTHESIOLOGY

## 2021-03-30 PROCEDURE — 2500000003 HC RX 250 WO HCPCS: Performed by: ANESTHESIOLOGY

## 2021-03-30 RX ORDER — LIDOCAINE HYDROCHLORIDE 5 MG/ML
INJECTION, SOLUTION INFILTRATION; INTRAVENOUS PRN
Status: DISCONTINUED | OUTPATIENT
Start: 2021-03-30 | End: 2021-03-30 | Stop reason: ALTCHOICE

## 2021-03-30 RX ORDER — METHYLPREDNISOLONE ACETATE 40 MG/ML
INJECTION, SUSPENSION INTRA-ARTICULAR; INTRALESIONAL; INTRAMUSCULAR; SOFT TISSUE PRN
Status: DISCONTINUED | OUTPATIENT
Start: 2021-03-30 | End: 2021-03-30 | Stop reason: ALTCHOICE

## 2021-03-30 RX ORDER — BUPIVACAINE HYDROCHLORIDE 5 MG/ML
INJECTION, SOLUTION PERINEURAL PRN
Status: DISCONTINUED | OUTPATIENT
Start: 2021-03-30 | End: 2021-03-30 | Stop reason: ALTCHOICE

## 2021-03-30 ASSESSMENT — PAIN DESCRIPTION - DESCRIPTORS: DESCRIPTORS: SQUEEZING

## 2021-03-30 ASSESSMENT — PAIN SCALES - GENERAL
PAINLEVEL_OUTOF10: 0
PAINLEVEL_OUTOF10: 0

## 2021-03-30 NOTE — H&P
Update History & Physical    The patient's History and Physical of March 9, 2021 was reviewed with the patient and I examined the patient. There was no change. The surgical site was confirmed by the patient and me. Plan: The risks, benefits, expected outcome, and alternative to the recommended procedure have been discussed with the patient. Patient understands and wants to proceed with the procedure. The patient was counseled at length about the risks of vishal Covid-19 during their perioperative period and any recovery window from their procedure. The patient was made aware that vishal Covid-19  may worsen their prognosis for recovering from their procedure  and lend to a higher morbidity and/or mortality risk. All material risks, benefits, and reasonable alternatives including postponing the procedure were discussed. The patient does wish to proceed with the procedure at this time.     Electronically signed by Rekha Benedict MD

## 2021-03-30 NOTE — OP NOTE
Operative Note      Patient: Luis A Gaona  YOB: 1965  MRN: [id number]    Date of Procedure: 3/30/2021    Pre-Op Diagnosis: LUMBOSACRAL SPONDYLOSIS    Post-Op Diagnosis: Same       Procedure(s):  BILATERAL LUMBAR MEDIAL BRANCH NERVE BLOCK UNDER FLUOROSCOPIC GUIDANCE AT L3, L4 AND L5 DORSAL RAMI     Surgeon(s):  Aleksey Moore MD    Assistant:   * No surgical staff found *    Anesthesia: Local    Estimated Blood Loss (mL): Minimal    Complications: None    Specimens:   * No specimens in log *    Implants:  * No implants in log *      Drains: * No LDAs found *    Findings: good needle placement    Detailed Description of Procedure:   3/30/2021    Patient: Luis A Gaona  :  1965  Age:  64 y.o. Sex:  female     PRE-OPERATIVE DIAGNOSIS: Bilateral Lumbar spondylosis, lumbar facet syndrome. POST-OPERATIVE DIAGNOSIS: Same. PROCEDURE:   Fluoroscopic guided lumbar medial branch blocks Bilateral at Levels: L3, L4, L5  SURGEON: Aleksey Moore MD    ANESTHESIA: local    ESTIMATED BLOOD LOSS: None.  ______________________________________________________________________  BRIEF HISTORY:  Luis A Gaona comes in today for  fluoroscopic guided lumbar medial branch blocks  Bilateral  at Levels: L3, L4, L5  The potential complications of this procedure were discussed with her again today. She has elected to undergo the aforementioned procedure. Robbie complete History & Physical examination were reviewed in depth, a copy of which is in the chart. DESCRIPTION OF PROCEDURE:   After confirming written and informed consent, a time-out was performed and Robbie name and date of birth, the procedure to be performed as well as the plan for the location of the needle insertion were confirmed. The patient was brought into the procedure room and placed in the prone position on the fluoroscopy table.  Standard monitors were placed and vital signs were observed throughout the procedure. The area of the lumbar spine was prepped with chloraprep and draped in a sterile manner. AP fluoroscopy was used to identify and maria isabel bartons point at the targeted levels. The skin and subcutaneous tissues in these identified areas were anesthetized with 0.5%Lidocaine. The # 22 G 5 inch spinal needles was advanced toward the junction of the superior articular process and the transverse process, along the course of the medial branch. Satisfactory needle placement was confirmed by AP and oblique projections. At the sacral alar level, the sacral alar region was visualized and the needle tip was positioned on the sacral alar at the base of the superior articulating process where the medial branch traverses under fluoroscopic guidance. Once bone was contacted and negative aspiration was confirmed. A solution of 0.5% marcaine with 5 mg of DepoMedrol 1 cc was then injected at each level. Following the procedure Nader Mendosa noted improvement of previous pain symptoms. Disposition the patient tolerated the procedure well and there were no complications . Vital signs remained stable throughout the procedure. The patient was escorted to the recovery area where they remained until discharge and written discharge instructions for the procedure were given. Plan: aNder Mendosa will return to our pain management center as scheduled. She noticed good pain relief immediately after the procedure. If short lasting, will plan RFA.     Liza Green MD

## 2021-03-30 NOTE — PROGRESS NOTES
Discharge instructions reviewed with patient. Verbalized understanding. VSS. Discharged home with family.

## 2021-04-20 ENCOUNTER — OFFICE VISIT (OUTPATIENT)
Dept: PAIN MANAGEMENT | Age: 56
End: 2021-04-20
Payer: MEDICAID

## 2021-04-20 ENCOUNTER — OFFICE VISIT (OUTPATIENT)
Dept: ORTHOPEDIC SURGERY | Age: 56
End: 2021-04-20
Payer: MEDICAID

## 2021-04-20 ENCOUNTER — PREP FOR PROCEDURE (OUTPATIENT)
Dept: PAIN MANAGEMENT | Age: 56
End: 2021-04-20

## 2021-04-20 VITALS
SYSTOLIC BLOOD PRESSURE: 120 MMHG | DIASTOLIC BLOOD PRESSURE: 82 MMHG | TEMPERATURE: 96.2 F | BODY MASS INDEX: 46.26 KG/M2 | RESPIRATION RATE: 16 BRPM | OXYGEN SATURATION: 95 % | WEIGHT: 245 LBS | HEART RATE: 70 BPM | HEIGHT: 61 IN

## 2021-04-20 VITALS — HEIGHT: 61 IN | BODY MASS INDEX: 46.26 KG/M2 | WEIGHT: 245 LBS | TEMPERATURE: 98 F

## 2021-04-20 DIAGNOSIS — M47.817 LUMBOSACRAL SPONDYLOSIS WITHOUT MYELOPATHY: Primary | ICD-10-CM

## 2021-04-20 DIAGNOSIS — M75.121 COMPLETE TEAR OF RIGHT ROTATOR CUFF, UNSPECIFIED WHETHER TRAUMATIC: Primary | ICD-10-CM

## 2021-04-20 DIAGNOSIS — M75.41 SHOULDER IMPINGEMENT, RIGHT: ICD-10-CM

## 2021-04-20 DIAGNOSIS — M51.36 DDD (DEGENERATIVE DISC DISEASE), LUMBAR: ICD-10-CM

## 2021-04-20 DIAGNOSIS — M54.2 CERVICALGIA: ICD-10-CM

## 2021-04-20 DIAGNOSIS — M17.0 PRIMARY OSTEOARTHRITIS OF BOTH KNEES: ICD-10-CM

## 2021-04-20 DIAGNOSIS — E66.9 OBESITY, UNSPECIFIED CLASSIFICATION, UNSPECIFIED OBESITY TYPE, UNSPECIFIED WHETHER SERIOUS COMORBIDITY PRESENT: ICD-10-CM

## 2021-04-20 DIAGNOSIS — M25.559 HIP PAIN: ICD-10-CM

## 2021-04-20 PROCEDURE — 99213 OFFICE O/P EST LOW 20 MIN: CPT | Performed by: ANESTHESIOLOGY

## 2021-04-20 PROCEDURE — 99213 OFFICE O/P EST LOW 20 MIN: CPT | Performed by: ORTHOPAEDIC SURGERY

## 2021-04-20 PROCEDURE — 20610 DRAIN/INJ JOINT/BURSA W/O US: CPT | Performed by: ORTHOPAEDIC SURGERY

## 2021-04-20 RX ORDER — TRIAMCINOLONE ACETONIDE 40 MG/ML
40 INJECTION, SUSPENSION INTRA-ARTICULAR; INTRAMUSCULAR ONCE
Status: COMPLETED | OUTPATIENT
Start: 2021-04-20 | End: 2021-04-20

## 2021-04-20 RX ADMIN — TRIAMCINOLONE ACETONIDE 40 MG: 40 INJECTION, SUSPENSION INTRA-ARTICULAR; INTRAMUSCULAR at 16:57
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
## 2021-04-20 NOTE — PROGRESS NOTES
Do you currently have any of the following:    Fever: No  Headache:  No  Cough: No  Shortness of breath: No  Exposed to anyone with these symptoms: No                                                                                                                Leonel Oliveira presents to the [facility] on 4/20/2021. Miya Dennison is complaining of pain in lower back,neck and left shoulder. . The pain is constant. The pain is described as aching, throbbing, shooting, stabbing and sharp. Pain is rated on her best day at a 6, on her worst day at a 8, and on average at a 7 on the VAS scale. She took her last dose of Iola last evening. Dalton Mota does not have issues with constipation. Any procedures since your last visit: Yes, with 70 % relief for 10 days and then the pain came back. She is not on NSAIDS and  is not on anticoagulation medications to include none and is managed by Dandre Ramos DO  . Pacemaker or defibrillator: No Physician managing device is NA. Medication Contract and Consent for Opioid Use Documents Filed      No documents found                   /82   Pulse 70   Temp 96.2 °F (35.7 °C) (Infrared)   Resp 16   Ht 5' 1\" (1.549 m)   Wt 245 lb (111.1 kg)   SpO2 95%   BMI 46.29 kg/m²      No LMP recorded.  Patient is postmenopausal.

## 2021-04-27 ENCOUNTER — HOSPITAL ENCOUNTER (OUTPATIENT)
Age: 56
Setting detail: OUTPATIENT SURGERY
Discharge: HOME | End: 2021-04-27
Attending: ANESTHESIOLOGY | Admitting: ANESTHESIOLOGY
Payer: MEDICAID

## 2021-04-27 ENCOUNTER — HOSPITAL ENCOUNTER (OUTPATIENT)
Dept: OPERATING ROOM | Age: 56
Setting detail: OUTPATIENT SURGERY
Discharge: HOME | End: 2021-04-27
Attending: ANESTHESIOLOGY
Payer: MEDICAID

## 2021-04-27 VITALS
TEMPERATURE: 97 F | OXYGEN SATURATION: 97 % | HEART RATE: 75 BPM | WEIGHT: 245 LBS | HEIGHT: 61 IN | SYSTOLIC BLOOD PRESSURE: 123 MMHG | BODY MASS INDEX: 46.26 KG/M2 | DIASTOLIC BLOOD PRESSURE: 74 MMHG | RESPIRATION RATE: 18 BRPM

## 2021-04-27 DIAGNOSIS — M47.896 OTHER OSTEOARTHRITIS OF SPINE, LUMBAR REGION: ICD-10-CM

## 2021-04-27 PROCEDURE — 7100000011 HC PHASE II RECOVERY - ADDTL 15 MIN: Performed by: ANESTHESIOLOGY

## 2021-04-27 PROCEDURE — 2500000003 HC RX 250 WO HCPCS: Performed by: ANESTHESIOLOGY

## 2021-04-27 PROCEDURE — 3600000015 HC SURGERY LEVEL 5 ADDTL 15MIN: Performed by: ANESTHESIOLOGY

## 2021-04-27 PROCEDURE — 3209999900 FLUORO FOR SURGICAL PROCEDURES

## 2021-04-27 PROCEDURE — 2709999900 HC NON-CHARGEABLE SUPPLY: Performed by: ANESTHESIOLOGY

## 2021-04-27 PROCEDURE — C1713 ANCHOR/SCREW BN/BN,TIS/BN: HCPCS | Performed by: ANESTHESIOLOGY

## 2021-04-27 PROCEDURE — 3600000005 HC SURGERY LEVEL 5 BASE: Performed by: ANESTHESIOLOGY

## 2021-04-27 PROCEDURE — 7100000010 HC PHASE II RECOVERY - FIRST 15 MIN: Performed by: ANESTHESIOLOGY

## 2021-04-27 PROCEDURE — 64635 DESTROY LUMB/SAC FACET JNT: CPT | Performed by: ANESTHESIOLOGY

## 2021-04-27 PROCEDURE — 64636 DESTROY L/S FACET JNT ADDL: CPT | Performed by: ANESTHESIOLOGY

## 2021-04-27 PROCEDURE — 6360000002 HC RX W HCPCS: Performed by: ANESTHESIOLOGY

## 2021-04-27 RX ORDER — LIDOCAINE HYDROCHLORIDE 5 MG/ML
INJECTION, SOLUTION INFILTRATION; INTRAVENOUS PRN
Status: DISCONTINUED | OUTPATIENT
Start: 2021-04-27 | End: 2021-04-27 | Stop reason: ALTCHOICE

## 2021-04-27 RX ORDER — METHYLPREDNISOLONE ACETATE 40 MG/ML
INJECTION, SUSPENSION INTRA-ARTICULAR; INTRALESIONAL; INTRAMUSCULAR; SOFT TISSUE PRN
Status: DISCONTINUED | OUTPATIENT
Start: 2021-04-27 | End: 2021-04-27 | Stop reason: ALTCHOICE

## 2021-04-27 RX ORDER — LIDOCAINE HYDROCHLORIDE 10 MG/ML
INJECTION, SOLUTION EPIDURAL; INFILTRATION; INTRACAUDAL; PERINEURAL PRN
Status: DISCONTINUED | OUTPATIENT
Start: 2021-04-27 | End: 2021-04-27 | Stop reason: ALTCHOICE

## 2021-04-27 RX ORDER — BUPIVACAINE HYDROCHLORIDE 2.5 MG/ML
INJECTION, SOLUTION EPIDURAL; INFILTRATION; INTRACAUDAL PRN
Status: DISCONTINUED | OUTPATIENT
Start: 2021-04-27 | End: 2021-04-27 | Stop reason: ALTCHOICE

## 2021-04-27 ASSESSMENT — PAIN SCALES - GENERAL: PAINLEVEL_OUTOF10: 0

## 2021-04-27 ASSESSMENT — PAIN DESCRIPTION - DESCRIPTORS: DESCRIPTORS: ACHING

## 2021-04-27 ASSESSMENT — PAIN - FUNCTIONAL ASSESSMENT: PAIN_FUNCTIONAL_ASSESSMENT: 0-10

## 2021-04-27 NOTE — H&P
Update History & Physical    The patient's History and Physical of April 20, 2021 was reviewed with the patient and I examined the patient. There was no change. The surgical site was confirmed by the patient and me. Plan: The risks, benefits, expected outcome, and alternative to the recommended procedure have been discussed with the patient. Patient understands and wants to proceed with the procedure. The patient was counseled at length about the risks of vishal Covid-19 during their perioperative period and any recovery window from their procedure. The patient was made aware that vishal Covid-19  may worsen their prognosis for recovering from their procedure  and lend to a higher morbidity and/or mortality risk. All material risks, benefits, and reasonable alternatives including postponing the procedure were discussed. The patient does wish to proceed with the procedure at this time.       Electronically signed by Jd Bennett MD

## 2021-04-27 NOTE — OP NOTE
were placed and vital signs were observed throughout the procedure. The area of the lumbar spine and upper buttocks was sterilely prepped with chloraprep and draped in a sterile manner. AP fluoroscopy was used to identify and maria isabel bartons point at the targeted area. # 20 G 10 mm radiofrequency probe was advanced toward each of these points. Once bone was contacted, negative aspiration for blood and CSF was confirmed, sensory stimulation was performed at 50 Hz and at 0.4 volts generating a pressure sensation. Motor stimulation < 2.0 volts elicited multifidus twitching without any radicular symptoms. 1 ml of 1% lidocaine was injected prior to lesioning, which was performed for 90 seconds at 90 degrees centigrade. Once the lesions were complete, a solution of 0.25% marcaine 1 cc and 5 mg DepoMedrol was injected and distributed  through each probe. The probes were removed . The patient's back was cleaned and bandages were placed over the needle insertion sites. Disposition the patient tolerated the procedure well and there were no complications . Vital signs remained stable throughout the procedure. The patient was escorted to the recovery area where they remained until discharge and written discharge instructions for the procedure were given. Plan: Primus Offer will return to our pain management center as scheduled.      Court Bergman MD

## 2021-05-13 ENCOUNTER — PREP FOR PROCEDURE (OUTPATIENT)
Dept: PAIN MANAGEMENT | Age: 56
End: 2021-05-13

## 2021-05-18 ENCOUNTER — TELEPHONE (OUTPATIENT)
Dept: PAIN MANAGEMENT | Age: 56
End: 2021-05-18

## 2021-05-18 NOTE — TELEPHONE ENCOUNTER
5-18-21-call to Peters Elvis to advise her to hold her Lodine 48 hours before her 5-25-21 procedure, last dose to be 5-22-21. Left voice mail message.   Ayse Thomson RN  Pain Management

## 2021-05-25 ENCOUNTER — HOSPITAL ENCOUNTER (OUTPATIENT)
Age: 56
Setting detail: OUTPATIENT SURGERY
Discharge: HOME | End: 2021-05-25
Attending: ANESTHESIOLOGY | Admitting: ANESTHESIOLOGY
Payer: MEDICAID

## 2021-05-25 ENCOUNTER — HOSPITAL ENCOUNTER (OUTPATIENT)
Dept: OPERATING ROOM | Age: 56
Setting detail: OUTPATIENT SURGERY
Discharge: HOME | End: 2021-05-25
Attending: ANESTHESIOLOGY
Payer: MEDICAID

## 2021-05-25 VITALS
BODY MASS INDEX: 47.58 KG/M2 | HEIGHT: 61 IN | HEART RATE: 67 BPM | WEIGHT: 252 LBS | SYSTOLIC BLOOD PRESSURE: 135 MMHG | OXYGEN SATURATION: 95 % | DIASTOLIC BLOOD PRESSURE: 59 MMHG | RESPIRATION RATE: 20 BRPM

## 2021-05-25 DIAGNOSIS — M47.896 OTHER OSTEOARTHRITIS OF SPINE, LUMBAR REGION: ICD-10-CM

## 2021-05-25 PROCEDURE — 3600000005 HC SURGERY LEVEL 5 BASE: Performed by: ANESTHESIOLOGY

## 2021-05-25 PROCEDURE — C1713 ANCHOR/SCREW BN/BN,TIS/BN: HCPCS | Performed by: ANESTHESIOLOGY

## 2021-05-25 PROCEDURE — 6360000002 HC RX W HCPCS: Performed by: ANESTHESIOLOGY

## 2021-05-25 PROCEDURE — 64635 DESTROY LUMB/SAC FACET JNT: CPT | Performed by: ANESTHESIOLOGY

## 2021-05-25 PROCEDURE — 7100000010 HC PHASE II RECOVERY - FIRST 15 MIN: Performed by: ANESTHESIOLOGY

## 2021-05-25 PROCEDURE — 2500000003 HC RX 250 WO HCPCS: Performed by: ANESTHESIOLOGY

## 2021-05-25 PROCEDURE — 64636 DESTROY L/S FACET JNT ADDL: CPT | Performed by: ANESTHESIOLOGY

## 2021-05-25 PROCEDURE — 2709999900 HC NON-CHARGEABLE SUPPLY: Performed by: ANESTHESIOLOGY

## 2021-05-25 PROCEDURE — 3209999900 FLUORO FOR SURGICAL PROCEDURES

## 2021-05-25 PROCEDURE — 7100000011 HC PHASE II RECOVERY - ADDTL 15 MIN: Performed by: ANESTHESIOLOGY

## 2021-05-25 PROCEDURE — 3600000015 HC SURGERY LEVEL 5 ADDTL 15MIN: Performed by: ANESTHESIOLOGY

## 2021-05-25 RX ORDER — LIDOCAINE HYDROCHLORIDE 10 MG/ML
INJECTION, SOLUTION EPIDURAL; INFILTRATION; INTRACAUDAL; PERINEURAL PRN
Status: DISCONTINUED | OUTPATIENT
Start: 2021-05-25 | End: 2021-05-25 | Stop reason: ALTCHOICE

## 2021-05-25 RX ORDER — LIDOCAINE HYDROCHLORIDE 5 MG/ML
INJECTION, SOLUTION INFILTRATION; INTRAVENOUS PRN
Status: DISCONTINUED | OUTPATIENT
Start: 2021-05-25 | End: 2021-05-25 | Stop reason: ALTCHOICE

## 2021-05-25 RX ORDER — METHYLPREDNISOLONE ACETATE 40 MG/ML
INJECTION, SUSPENSION INTRA-ARTICULAR; INTRALESIONAL; INTRAMUSCULAR; SOFT TISSUE PRN
Status: DISCONTINUED | OUTPATIENT
Start: 2021-05-25 | End: 2021-05-25 | Stop reason: ALTCHOICE

## 2021-05-25 RX ORDER — BUPIVACAINE HYDROCHLORIDE 2.5 MG/ML
INJECTION, SOLUTION EPIDURAL; INFILTRATION; INTRACAUDAL PRN
Status: DISCONTINUED | OUTPATIENT
Start: 2021-05-25 | End: 2021-05-25 | Stop reason: ALTCHOICE

## 2021-05-25 ASSESSMENT — PAIN SCALES - GENERAL
PAINLEVEL_OUTOF10: 0
PAINLEVEL_OUTOF10: 0

## 2021-05-25 ASSESSMENT — PAIN - FUNCTIONAL ASSESSMENT: PAIN_FUNCTIONAL_ASSESSMENT: 0-10

## 2021-05-25 NOTE — OP NOTE
upper buttocks was sterilely prepped with chloraprep and draped in a sterile manner. AP fluoroscopy was used to identify and maria isabel bartons point at the targeted area. The # 20 gauge 10 mm radiofrequency probe was advanced toward each of these points. Once bone was contacted, negative aspiration for blood and CSF was confirmed, sensory stimulation was performed at 50 Hz and at 0.4 volts generating a pressure sensation. Motor stimulation < 2.0 volts elicited multifidus twitching without any radicular symptoms. 1 ml of 1% lidocaine was injected prior to lesioning, which was performed for 90 seconds at 90 degrees centigrade. Once the lesions were complete, a solution of 0.25% marcaine 1 cc and 5 mg DepoMedrol was injected through each probe. The probes were removed. The patient's back was cleaned and bandages were placed over the needle insertion sites. Disposition the patient tolerated the procedure well and there were no complications . Vital signs remained stable throughout the procedure. The patient was escorted to the recovery area where they remained until discharge and written discharge instructions for the procedure were given. Plan: Ivone will return to our pain management center as scheduled.      Court Bergman MD

## 2021-05-25 NOTE — H&P
[facility] OR    OTHER SURGICAL HISTORY Bilateral 03/30/2021    BILATERAL LUMBAR MEDIAL BRANCH BLOCK L3,4,5 DORSAL RAMI    OTHER SURGICAL HISTORY Right 04/27/2021    lumbar radiofrequency    PAIN MANAGEMENT PROCEDURE Right 4/27/2021    RIGHT LUMBAR  RADIOFREQUENCY ABLATION UNDER FLUOROSCOPIC GUIDANCE AT L3, L4 AND L5 DORSAL RAMI UNDER FLUOROSCOPY (CPT 45656) performed by Nancy Garcia MD at [address] ARTHROSCOPY Right 01/08/2021    SHOULDER ARTHROSCOPY Right 1/8/2021    RIGHT SHOULDER ARTHROSCOPY ROTATOR CUFF REPAIR SUBACROMIAL DECOMPRESSION AND DEBRIDEMENT ([address]) performed by Stefanie Gaines DO at [address] [facility] [address]         Prior to Admission medications    Medication Sig Start Date End Date Taking? Authorizing Provider   furosemide (LASIX) 20 MG tablet Take 20 mg by mouth daily    Historical Provider, MD   levothyroxine (SYNTHROID) 25 MCG tablet Take 25 mcg by mouth Daily    Historical Provider, MD   chlorthalidone (HYGROTEN) 50 MG tablet Take 50 mg by mouth daily     Historical Provider, MD   Misc. Devices (WRIST BRACE) MISC Right wrist brace for carpal tunnel. Wear at bedtime. 12/16/20   Stefanie Gaines DO   potassium chloride (KLOR-CON) 20 MEQ packet Take 20 mEq by mouth daily    Historical Provider, MD   busPIRone (BUSPAR) 15 MG tablet Take 15 mg by mouth 3 times daily    Historical Provider, MD   VORTIoxetine (TRINTELLIX) 10 MG TABS tablet Take 10 mg by mouth daily    Historical Provider, MD   VRAYLAR 1.5 MG capsule Take 1.5 mg by mouth daily  11/10/20   Historical Provider, MD   losartan-hydroCHLOROthiazide (HYZAAR) 100-12.5 MG per tablet Take 1 tablet by mouth daily    Historical Provider, MD   melatonin 3 MG TABS tablet Take 10 mg by mouth nightly as needed     Historical Provider, MD   HYDROcodone-acetaminophen (NORCO) 7.5-325 MG per tablet Take 1 tablet by mouth 2 times daily.      Historical Provider, MD   budesonide-formoterol (SYMBICORT) 160-4.5 MCG/ACT AERO Inhale 2 puffs into the lungs 2 times daily    Historical Provider, MD   ciclopirox (LOPROX) 0.77 % cream Apply topically 2 times daily Apply topically 2 times daily. Historical Provider, MD   hydrocortisone (HYTONE) 2.5 % lotion Apply topically 2 times daily Apply topically 2 times daily. Historical Provider, MD   etodolac (LODINE) 400 MG tablet TAKE ONE TABLET BY MOUTH TWO TIMES A DAY WITH FOOD 8/17/20   Historical Provider, MD   hydrOXYzine (VISTARIL) 25 MG capsule Take 25 mg by mouth 3 times daily as needed for Itching    Historical Provider, MD   loratadine (CLARITIN) 10 MG tablet Take 10 mg by mouth daily     Historical Provider, MD   metoprolol (LOPRESSOR) 100 MG tablet Take 100 mg by mouth 2 times daily     Historical Provider, MD   tiZANidine (ZANAFLEX) 4 MG tablet Take 1 tablet by mouth 3 times daily  Patient taking differently: Take 4 mg by mouth 2 times daily  10/18/18   Hallie Christensen DO   albuterol sulfate  (90 BASE) MCG/ACT inhaler Inhale 2 puffs into the lungs every 6 hours as needed for Wheezing    Historical Provider, MD   montelukast (SINGULAIR) 10 MG tablet Take 10 mg by mouth nightly. Historical Provider, MD   gabapentin (NEURONTIN) 300 MG capsule Take 300 mg by mouth 2 times daily.      Historical Provider, MD   pantoprazole (PROTONIX) 40 MG tablet Take 40 mg by mouth nightly     Historical Provider, MD       Allergies   Allergen Reactions    Cat Hair Extract     Cymbalta [Duloxetine Hcl] Other (See Comments)     anger    Dexlansoprazole Nausea Only    Diovan [Valsartan] Other (See Comments)     Cough, sore throat    Ditropan [Oxybutynin] Other (See Comments)     hoarsness    Lunesta [Eszopiclone] Other (See Comments)     Bad dreams    Molds & Smuts     Norvasc [Amlodipine Besylate] Other (See Comments)     cough    Seasonal     Sular [Nisoldipine Er] Other (See Comments)     Cough      Tape Bola Dryer Tape]      rash    Dextromethorphan Polistirex Er Rash    Levaquin [Levofloxacin] Rash    Other Rash     Strawberries, wheat, peppers    Questran [Cholestyramine] Rash    Yellow Dyes (Non-Tartrazine) Rash     Yellow dye #6, (#11 & #5 if combined)       Social History     Socioeconomic History    Marital status: Single     Spouse name: Not on file    Number of children: Not on file    Years of education: Not on file    Highest education level: Not on file   Occupational History    Not on file   Tobacco Use    Smoking status: Never Smoker    Smokeless tobacco: Never Used   Vaping Use    Vaping Use: Never used   Substance and Sexual Activity    Alcohol use: Yes     Comment: occas    Drug use: No    Sexual activity: Not Currently   Other Topics Concern    Not on file   Social History Narrative    Not on file     Social Determinants of Health     Financial Resource Strain:     Difficulty of Paying Living Expenses:    Food Insecurity:     Worried About Running Out of Food in the Last Year:     Ran Out of Food in the Last Year:    Transportation Needs:     Lack of Transportation (Medical):      Lack of Transportation (Non-Medical):    Physical Activity:     Days of Exercise per Week:     Minutes of Exercise per Session:    Stress:     Feeling of Stress :    Social Connections:     Frequency of Communication with Friends and Family:     Frequency of Social Gatherings with Friends and Family:     Attends Voodoo Services:     Active Member of Clubs or Organizations:     Attends Club or Organization Meetings:     Marital Status:    Intimate Partner Violence:     Fear of Current or Ex-Partner:     Emotionally Abused:     Physically Abused:     Sexually Abused:        Family History   Problem Relation Age of Onset    Other Mother     High Blood Pressure Mother     Cancer Father     High Blood Pressure Brother     High Blood Pressure Maternal Grandfather          REVIEW OF SYSTEMS:    CONSTITUTIONAL:

## 2021-06-17 ENCOUNTER — HOSPITAL ENCOUNTER (OUTPATIENT)
Dept: INTERVENTIONAL RADIOLOGY/VASCULAR | Age: 56
Discharge: HOME | End: 2021-06-19
Payer: MEDICAID

## 2021-06-17 DIAGNOSIS — I79.1 AORTITIS IN DISEASES CLASSIFIED ELSEWHERE (HCC): ICD-10-CM

## 2021-06-17 PROCEDURE — 93923 UPR/LXTR ART STDY 3+ LVLS: CPT

## 2021-06-21 ENCOUNTER — OFFICE VISIT (OUTPATIENT)
Dept: ORTHOPEDIC SURGERY | Age: 56
End: 2021-06-21
Payer: MEDICAID

## 2021-06-21 VITALS — TEMPERATURE: 98 F | HEIGHT: 61 IN | BODY MASS INDEX: 46.14 KG/M2 | WEIGHT: 244.4 LBS

## 2021-06-21 DIAGNOSIS — S43.431A TEAR OF RIGHT GLENOID LABRUM, INITIAL ENCOUNTER: ICD-10-CM

## 2021-06-21 DIAGNOSIS — M75.121 COMPLETE TEAR OF RIGHT ROTATOR CUFF, UNSPECIFIED WHETHER TRAUMATIC: Primary | ICD-10-CM

## 2021-06-21 DIAGNOSIS — M75.41 SHOULDER IMPINGEMENT, RIGHT: ICD-10-CM

## 2021-06-21 DIAGNOSIS — M16.12 PRIMARY OSTEOARTHRITIS OF LEFT HIP: ICD-10-CM

## 2021-06-21 PROCEDURE — 99213 OFFICE O/P EST LOW 20 MIN: CPT | Performed by: ORTHOPAEDIC SURGERY

## 2021-06-21 RX ORDER — POTASSIUM CHLORIDE 20 MEQ/1
TABLET, EXTENDED RELEASE ORAL
COMMUNITY
Start: 2021-06-14 | End: 2022-07-05
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
## 2021-07-09 ENCOUNTER — OFFICE VISIT (OUTPATIENT)
Dept: PAIN MANAGEMENT | Age: 56
End: 2021-07-09
Payer: MEDICAID

## 2021-07-09 VITALS
OXYGEN SATURATION: 98 % | HEART RATE: 107 BPM | BODY MASS INDEX: 47.2 KG/M2 | RESPIRATION RATE: 20 BRPM | SYSTOLIC BLOOD PRESSURE: 128 MMHG | TEMPERATURE: 96.2 F | DIASTOLIC BLOOD PRESSURE: 74 MMHG | HEIGHT: 61 IN | WEIGHT: 250 LBS

## 2021-07-09 DIAGNOSIS — M53.3 SACROILIAC DYSFUNCTION: ICD-10-CM

## 2021-07-09 DIAGNOSIS — M47.817 LUMBOSACRAL SPONDYLOSIS WITHOUT MYELOPATHY: Primary | ICD-10-CM

## 2021-07-09 DIAGNOSIS — M51.36 DDD (DEGENERATIVE DISC DISEASE), LUMBAR: ICD-10-CM

## 2021-07-09 DIAGNOSIS — E66.9 OBESITY, UNSPECIFIED CLASSIFICATION, UNSPECIFIED OBESITY TYPE, UNSPECIFIED WHETHER SERIOUS COMORBIDITY PRESENT: ICD-10-CM

## 2021-07-09 DIAGNOSIS — M25.559 HIP PAIN: ICD-10-CM

## 2021-07-09 PROCEDURE — 99213 OFFICE O/P EST LOW 20 MIN: CPT | Performed by: ANESTHESIOLOGY

## 2021-07-09 NOTE — PROGRESS NOTES
Do you currently have any of the following:    Fever: No  Headache:  No  Cough: No  Shortness of breath: No  Exposed to anyone with these symptoms: No                                                                                                                Litzy Belts presents to the [facility] on 7/9/2021. Ferny Ornelas is complaining of pain in her lower back which radiates down left leg to back of knee and neck and shoulder pain which radiates down right arm to fingers. The pain is constant. The pain is described as throbbing. Pain is rated on her best day at a 7, on her worst day at a 8, and on average at a 7 on the VAS scale. She took her last dose of Norco and lodine and zanaflex last evening, . Ferny Ornelas does not have issues with constipation. Any procedures since your last visit: No,     She is not on NSAIDS and  is not on anticoagulation medications to include none     Pacemaker or defibrillator: No     Medication Contract and Consent for Opioid Use Documents Filed      No documents found                   /74, pulse 107, Temp 96.2 oxygen sat 98%    No LMP recorded.  Patient is postmenopausal.
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
## 2021-07-12 ENCOUNTER — HOSPITAL ENCOUNTER (OUTPATIENT)
Dept: INTERVENTIONAL RADIOLOGY/VASCULAR | Age: 56
Discharge: HOME | End: 2021-07-12
Payer: MEDICAID

## 2021-07-12 DIAGNOSIS — M16.12 PRIMARY OSTEOARTHRITIS OF LEFT HIP: ICD-10-CM

## 2021-07-12 PROCEDURE — 20610 DRAIN/INJ JOINT/BURSA W/O US: CPT | Performed by: RADIOLOGY

## 2021-07-12 PROCEDURE — 6360000002 HC RX W HCPCS: Performed by: RADIOLOGY

## 2021-07-12 PROCEDURE — 6360000004 HC RX CONTRAST MEDICATION: Performed by: RADIOLOGY

## 2021-07-12 PROCEDURE — 2500000003 HC RX 250 WO HCPCS: Performed by: RADIOLOGY

## 2021-07-12 PROCEDURE — 77002 NEEDLE LOCALIZATION BY XRAY: CPT | Performed by: RADIOLOGY

## 2021-07-12 RX ORDER — BUPIVACAINE HYDROCHLORIDE 2.5 MG/ML
8 INJECTION, SOLUTION EPIDURAL; INFILTRATION; INTRACAUDAL ONCE
Status: COMPLETED | OUTPATIENT
Start: 2021-07-12 | End: 2021-07-12

## 2021-07-12 RX ORDER — TRIAMCINOLONE ACETONIDE 40 MG/ML
40 INJECTION, SUSPENSION INTRA-ARTICULAR; INTRAMUSCULAR ONCE
Status: COMPLETED | OUTPATIENT
Start: 2021-07-12 | End: 2021-07-12

## 2021-07-12 RX ADMIN — IOPAMIDOL 3 ML: 612 INJECTION, SOLUTION INTRATHECAL at 13:56

## 2021-07-12 RX ADMIN — BUPIVACAINE HYDROCHLORIDE 20 MG: 2.5 INJECTION, SOLUTION EPIDURAL; INFILTRATION; INTRACAUDAL; PERINEURAL at 13:56

## 2021-07-12 RX ADMIN — TRIAMCINOLONE ACETONIDE 40 MG: 40 INJECTION, SUSPENSION INTRA-ARTICULAR; INTRAMUSCULAR at 13:56

## 2021-07-12 ASSESSMENT — PAIN SCALES - GENERAL: PAINLEVEL_OUTOF10: 5

## 2021-07-13 ENCOUNTER — OFFICE VISIT (OUTPATIENT)
Dept: ORTHOPEDIC SURGERY | Age: 56
End: 2021-07-13
Payer: MEDICAID

## 2021-07-13 VITALS — BODY MASS INDEX: 47.2 KG/M2 | TEMPERATURE: 98 F | HEIGHT: 61 IN | WEIGHT: 250 LBS

## 2021-07-13 DIAGNOSIS — G56.02 LEFT CARPAL TUNNEL SYNDROME: Primary | ICD-10-CM

## 2021-07-13 PROCEDURE — 99213 OFFICE O/P EST LOW 20 MIN: CPT | Performed by: ORTHOPAEDIC SURGERY
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
## 2021-07-14 ENCOUNTER — OFFICE VISIT (OUTPATIENT)
Dept: PHYSICAL MEDICINE AND REHAB | Age: 56
End: 2021-07-14
Payer: MEDICAID

## 2021-07-14 VITALS — WEIGHT: 250 LBS | BODY MASS INDEX: 47.2 KG/M2 | HEIGHT: 61 IN

## 2021-07-14 DIAGNOSIS — R20.2 PARESTHESIA OF LEFT ARM: Primary | ICD-10-CM

## 2021-07-14 PROCEDURE — 95886 MUSC TEST DONE W/N TEST COMP: CPT | Performed by: PHYSICAL MEDICINE & REHABILITATION

## 2021-07-14 PROCEDURE — 95912 NRV CNDJ TEST 11-12 STUDIES: CPT | Performed by: PHYSICAL MEDICINE & REHABILITATION

## 2021-07-14 PROCEDURE — 99213 OFFICE O/P EST LOW 20 MIN: CPT | Performed by: PHYSICAL MEDICINE & REHABILITATION

## 2021-07-14 NOTE — PROGRESS NOTES
[facility]  Electrodiagnostic Laboratory  *Accredited by the [address] [place] [address] with exemplary status  [address] [facility] [address]  Phone: [phone]  Fax: [phone]    Referring Provider: Adam Duff DO  Primary Care Physician: Ivan Aburto DO  Patient Name: Tee Gregorio  Patient YOB: 1965  Gender: female  BMI: Body mass index is 47.24 kg/m². Height 5' 1\" (1.549 m), weight 250 lb (113.4 kg). 7/14/2021    Description of clinical problem:   Chief Complaint   Patient presents with    Extremity Pain     pain in left hand and fingers from wrist down. Pain described as pins and needles. pain rated 7/10. symptoms for 1 year with no acute injury.  Numbness     left hand and fingers    Extremity Weakness     some noted weakness. Sensory NCS      Nerve / Sites Rec. Site Peak Lat PP Amp Segments Distance Velocity Temp. ms µV  cm m/s °C   L Median - Digit II (Antidromic)      Palm Dig II 1.77 72.5 Palm - Dig II 7 64 33.5      Wrist Dig II 3.33 65.0 Wrist - Dig II 14 55 33.5   L Ulnar - Digit V (Antidromic)      Wrist Dig V 3.23 73.9 Wrist - Dig V 14 56 33.4   L Radial - Anatomical snuff box (Forearm)      Forearm Wrist 2.34 38.8 Forearm - Wrist 10 56 34.3       Combined Sensory Index      Nerve / Sites Rec. Site Peak Lat NP Amp PP Amp Segments Dist. Peak Diff Temp.      ms µV µV  cm ms °C   L Median - CSI      Median Thumb 2.66 25.4 31.0 Median - Radial 10 0.16 34.1      Radial Thumb 2.50 12.9 20.1 Median - Ulnar 14 0.36 34      Median Ring 3.54 17.4 7.2 Median palm - Ulnar palm 8 0.10 34.3      Ulnar Ring 3.18 12.6 25.7          Median palm Wrist 2.08 26.1 47.8          Ulnar palm Wrist 1.98 51.7 28.6          CSI     CSI  0.62        Motor NCS      Nerve / Sites Muscle Onset Amplitude Segments Distance Velocity Temp.     ms mV  cm m/s °C   L Median - APB      Palm APB 1.35 8.8 Palm - APB   33.1      Wrist APB 2.71 7.4 Wrist - Palm 8 59 33.4 Elbow APB 6.15 6.7 Elbow - Wrist 21 61 22   L Ulnar - ADM      Wrist ADM 2.45 6.7 Wrist - ADM 8  34.5      B. Elbow ADM 5.31 6.7 B. Elbow - Wrist 17 59 33.8      A. Elbow ADM 6.98 5.5 A. Elbow - B. Elbow 10 60 34.1       F  Wave      Nerve Fmin % F    ms %   L Median - APB 24.79 100   L Ulnar - ADM 25.83 100       EMG      EMG Summary Table     Spontaneous MUAP Recruitment   Muscle Nerve Roots IA Fib PSW Fasc Amp Dur. PPP Pattern   L. Deltoid Axillary C5-C6 N None None None N N N N   L. Biceps brachii Musculocutaneous C5-C6 N None None None N N N N   L. Triceps brachii Radial C6-C8 N None None None N N N N   L. Pronator teres Median C6-C7 N None None None N N N N   L. First dorsal interosseous Ulnar C8-T1 N None None None N N N N   L. Abductor pollicis brevis Median K1-T1 N None None None N N N N       Study Limitations:  none    Summary of Findings:   Nerve conduction studies:   · All nerve conduction studies listed in the table above were normal in latency, amplitude and conduction velocity. Needle EMG:   · Needle EMG was performed using a concentric needle.  All muscles tested, as listed in the table above demonstrated normal amplitude, duration, phases and recruitment and no active denervation signs were seen. Diagnostic Interpretation: This study was normal.     Previous Study: No prior left upper extremity EMG for comparison. Follow up EMG is recommended if clinically warranted. Technologist: Pantera Lopez  Physician:    Angelo Zapata D.O., P.T. Board Certified Physical Medicine and Rehabilitation  Board Certified Electrodiagnostic Medicine      Nerve conduction studies and electromyography were performed according to our laboratory policies and procedures which can be provided upon request. All abnormal values are identified in the table.  Laboratory normal values can also be provided upon request.       Cc: DO Saige Jackson DO

## 2021-07-14 NOTE — PATIENT INSTRUCTIONS
Electrodiagnotic Laboratory  Accredited by the [facility] with Exemplary status  GADIEL Simon  Gar GADIEL Umaña. [facility]  [address] [facility] [address]  Phone: [phone]  Fax: [phone]        Today you had an electrodiagnostic exam which included nerve conduction studies (NCS) and electromyography (EMG). This test evaluated the electrical activity of your nerves and muscles to help determine if you have a nerve or muscle disease. This test can help determine the location and type of a nerve or muscle problem. This will help your referring doctor diagnose your condition and determine the appropriate next step in your treatment plan. After your test:    1. There are no long lasting side effects of the test.     2. You may resume your normal activities without restrictions. 3.  Resume any medications that were stopped for the test.     4  If you have sore areas or bruising in your muscles where the needle was placed, apply a cold pack to the sore area for 15-20 minutes three to four times a day as needed for pain. The soreness should go away in about 1-2 days. 5. Your results were provided  Briefly at the end of your test and the final detailed report will be provided to your referring physician, and/or primary care physician and any other parties you requested within 1-2 days of the examination. You may wish to contact your referring provider after a few days to determine what they would like you to do next. 6.  Please call [phone] with any questions or concerns and if you develop increased body temperature/fever, swelling, tenderness, increased pain and/or drainage from the sites where the needle was placed. Thank you for choosing us for your health care needs.

## 2021-07-20 ENCOUNTER — OFFICE VISIT (OUTPATIENT)
Dept: ORTHOPEDIC SURGERY | Age: 56
End: 2021-07-20
Payer: MEDICAID

## 2021-07-20 VITALS — BODY MASS INDEX: 46.26 KG/M2 | TEMPERATURE: 98 F | HEIGHT: 61 IN | WEIGHT: 245 LBS

## 2021-07-20 DIAGNOSIS — M75.21 BICEPS TENDINITIS OF RIGHT UPPER EXTREMITY: ICD-10-CM

## 2021-07-20 DIAGNOSIS — M48.02 CERVICAL STENOSIS OF SPINE: ICD-10-CM

## 2021-07-20 DIAGNOSIS — M75.121 COMPLETE TEAR OF RIGHT ROTATOR CUFF, UNSPECIFIED WHETHER TRAUMATIC: Primary | ICD-10-CM

## 2021-07-20 DIAGNOSIS — M75.41 SHOULDER IMPINGEMENT, RIGHT: ICD-10-CM

## 2021-07-20 PROCEDURE — 76942 ECHO GUIDE FOR BIOPSY: CPT | Performed by: ORTHOPAEDIC SURGERY

## 2021-07-20 PROCEDURE — 99214 OFFICE O/P EST MOD 30 MIN: CPT | Performed by: ORTHOPAEDIC SURGERY

## 2021-07-20 PROCEDURE — 20550 NJX 1 TENDON SHEATH/LIGAMENT: CPT | Performed by: ORTHOPAEDIC SURGERY

## 2021-07-20 RX ORDER — TRIAMCINOLONE ACETONIDE 40 MG/ML
40 INJECTION, SUSPENSION INTRA-ARTICULAR; INTRAMUSCULAR ONCE
Status: COMPLETED | OUTPATIENT
Start: 2021-07-20 | End: 2021-07-20

## 2021-07-20 RX ADMIN — TRIAMCINOLONE ACETONIDE 40 MG: 40 INJECTION, SUSPENSION INTRA-ARTICULAR; INTRAMUSCULAR at 09:33
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
## 2021-07-27 DIAGNOSIS — M75.121 COMPLETE TEAR OF RIGHT ROTATOR CUFF, UNSPECIFIED WHETHER TRAUMATIC: Primary | ICD-10-CM

## 2021-08-10 DIAGNOSIS — M16.12 PRIMARY OSTEOARTHRITIS OF LEFT HIP: Primary | ICD-10-CM

## 2021-08-17 ENCOUNTER — OFFICE VISIT (OUTPATIENT)
Dept: ORTHOPEDIC SURGERY | Age: 56
End: 2021-08-17
Payer: MEDICAID

## 2021-08-17 VITALS — WEIGHT: 257.1 LBS | HEIGHT: 61 IN | TEMPERATURE: 98 F | BODY MASS INDEX: 48.54 KG/M2

## 2021-08-17 DIAGNOSIS — M75.21 BICEPS TENDINITIS OF RIGHT UPPER EXTREMITY: Primary | ICD-10-CM

## 2021-08-17 PROCEDURE — 99213 OFFICE O/P EST LOW 20 MIN: CPT | Performed by: ORTHOPAEDIC SURGERY

## 2021-08-17 RX ORDER — IBUPROFEN 400 MG/1
TABLET ORAL
COMMUNITY
Start: 2021-07-26 | End: 2021-11-01
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
## 2021-08-19 ENCOUNTER — TELEPHONE (OUTPATIENT)
Dept: PHYSICAL MEDICINE AND REHAB | Age: 56
End: 2021-08-19

## 2021-08-19 ENCOUNTER — OFFICE VISIT (OUTPATIENT)
Dept: PHYSICAL MEDICINE AND REHAB | Age: 56
End: 2021-08-19
Payer: MEDICAID

## 2021-08-19 VITALS
BODY MASS INDEX: 46.82 KG/M2 | WEIGHT: 248 LBS | SYSTOLIC BLOOD PRESSURE: 133 MMHG | DIASTOLIC BLOOD PRESSURE: 70 MMHG | HEIGHT: 61 IN

## 2021-08-19 DIAGNOSIS — M54.2 CHRONIC NECK PAIN: Primary | ICD-10-CM

## 2021-08-19 DIAGNOSIS — R53.1 WEAKNESS: ICD-10-CM

## 2021-08-19 DIAGNOSIS — E66.01 CLASS 3 SEVERE OBESITY DUE TO EXCESS CALORIES WITH BODY MASS INDEX (BMI) OF 45.0 TO 49.9 IN ADULT, UNSPECIFIED WHETHER SERIOUS COMORBIDITY PRESENT (HCC): ICD-10-CM

## 2021-08-19 DIAGNOSIS — R44.9 SENSORY DEFICIT, RIGHT: ICD-10-CM

## 2021-08-19 DIAGNOSIS — G89.29 CHRONIC NECK PAIN: Primary | ICD-10-CM

## 2021-08-19 PROCEDURE — 99214 OFFICE O/P EST MOD 30 MIN: CPT | Performed by: PHYSICAL MEDICINE & REHABILITATION

## 2021-08-19 NOTE — TELEPHONE ENCOUNTER
Called and spoke with patient, informed her that her test results are normal. Pt verbalized an understanding.

## 2021-08-19 NOTE — TELEPHONE ENCOUNTER
----- Message from Mony Floyd DO sent at 8/19/2021  3:56 PM EDT -----  Test results are normal please notify patient.

## 2021-08-19 NOTE — TELEPHONE ENCOUNTER
----- Message from René Altamirano DO sent at 8/19/2021  3:56 PM EDT -----  Test results are normal please notify patient.

## 2021-08-19 NOTE — PATIENT INSTRUCTIONS
Patient Education        Neck Arthritis: Exercises  Introduction  Here are some examples of exercises for you to try. The exercises may be suggested for a condition or for rehabilitation. Start each exercise slowly. Ease off the exercises if you start to have pain. You will be told when to start these exercises and which ones will work best for you. How to do the exercises  Neck stretches to the side   1. This stretch works best if you keep your shoulder down as you lean away from it. To help you remember to do this, start by relaxing your shoulders and lightly holding on to your thighs or your chair. 2. Tilt your head toward your shoulder and hold for 15 to 30 seconds. Let the weight of your head stretch your muscles. 3. Repeat 2 to 4 times toward each shoulder. Chin tuck   1. Lie on the floor with a rolled-up towel under your neck. Your head should be touching the floor. 2. Slowly bring your chin toward your chest.  3. Hold for a count of 6, and then relax for up to 10 seconds. 4. Repeat 8 to 12 times. Active cervical rotation   1. Sit in a firm chair, or stand up straight. 2. Keeping your chin level, turn your head to the right, and hold for 15 to 30 seconds. 3. Turn your head to the left and hold for 15 to 30 seconds. 4. Repeat 2 to 4 times to each side. Shoulder blade squeeze   1. While standing, squeeze your shoulder blades together. 2. Do not raise your shoulders up as you are squeezing. 3. Hold for 6 seconds. 4. Repeat 8 to 12 times. Shoulder rolls   1. Sit comfortably with your feet shoulder-width apart. You can also do this exercise standing up. 2. Roll your shoulders up, then back, and then down in a smooth, circular motion. 3. Repeat 2 to 4 times. Follow-up care is a key part of your treatment and safety. Be sure to make and go to all appointments, and call your doctor if you are having problems.  It's also a good idea to know your test results and keep a list of the medicines you take. Where can you learn more? Go to https://chpepiceweb.1SDK. org and sign in to your [employer] account. Enter U360 in the Patient Education Systems box to learn more about \"Neck Arthritis: Exercises. \"     If you do not have an account, please click on the \"Sign Up Now\" link. Current as of: November 16, 2020               Content Version: 12.9  © 2006-2021 [employer]. Care instructions adapted under license by [employer] ([facility]). If you have questions about a medical condition or this instruction, always ask your healthcare professional. [address] any warranty or liability for your use of this information. Patient Education         Spinal Stenosis: Home Treatment and Physical Therapy (01:37)  Your health professional recommends that you watch this short online health video. Learn how home treatment and physical therapy can help you avoid surgery. Purpose:  Explains how home treatment and physical therapy can help patients avoid surgery for spinal stenosis. Goal:  The user will identify key points about nonsurgical treatment options. How to watch the video    Scan the QR code   OR Visit the website    [email or web address]   Current as of: November 16, 2020               Content Version: 12.9  © 2006-2021 [employer]. Care instructions adapted under license by [employer] ([facility]). If you have questions about a medical condition or this instruction, always ask your healthcare professional. [employer] any warranty or liability for your use of this information. Patient Education        Neck: Exercises  Introduction  Here are some examples of exercises for you to try. The exercises may be suggested for a condition or for rehabilitation. Start each exercise slowly. Ease off the exercises if you start to have pain.   You will be told when to start these exercises and which ones will work best for you.  How to do the exercises  Neck stretch   1. This stretch works best if you keep your shoulder down as you lean away from it. To help you remember to do this, start by relaxing your shoulders and lightly holding on to your thighs or your chair. 2. Tilt your head toward your shoulder and hold for 15 to 30 seconds. Let the weight of your head stretch your muscles. 3. If you would like a little added stretch, use your hand to gently and steadily pull your head toward your shoulder. For example, keeping your right shoulder down, lean your head to the left. 4. Repeat 2 to 4 times toward each shoulder. Diagonal neck stretch   1. Turn your head slightly toward the direction you will be stretching, and tilt your head diagonally toward your chest and hold for 15 to 30 seconds. 2. If you would like a little added stretch, use your hand to gently and steadily pull your head forward on the diagonal.  3. Repeat 2 to 4 times toward each side. Dorsal glide stretch   The dorsal glide stretches the back of the neck. If you feel pain, do not glide so far back. Some people find this exercise easier to do while lying on their backs with an ice pack on the neck. 1. Sit or stand tall and look straight ahead. 2. Slowly tuck your chin as you glide your head backward over your body  3. Hold for a count of 6, and then relax for up to 10 seconds. 4. Repeat 8 to 12 times. Chest and shoulder stretch   1. Sit or stand tall and glide your head backward as in the dorsal glide stretch. 2. Raise both arms so that your hands are next to your ears. 3. Take a deep breath, and as you breathe out, lower your elbows down and behind your back. You will feel your shoulder blades slide down and together, and at the same time you will feel a stretch across your chest and the front of your shoulders. 4. Hold for about 6 seconds, and then relax for up to 10 seconds. 5. Repeat 8 to 12 times. Strengthening: Hands on head   1.  Move your head backward, forward, and side to side against gentle pressure from your hands, holding each position for about 6 seconds. 2. Repeat 8 to 12 times. Follow-up care is a key part of your treatment and safety. Be sure to make and go to all appointments, and call your doctor if you are having problems. It's also a good idea to know your test results and keep a list of the medicines you take. Where can you learn more? Go to https://Banki.rupepicVericant.[email or web address]. org and sign in to your [facility] account. Enter P975 in the [employer] box to learn more about \"Neck: Exercises. \"     If you do not have an account, please click on the \"Sign Up Now\" link. Current as of: November 16, 2020               Content Version: 12.9  © 2006-2021 [employer]. Care instructions adapted under license by [employer] ([facility]). If you have questions about a medical condition or this instruction, always ask your healthcare professional. [place] [address] any warranty or liability for your use of this information. Patient Education         Ergonomics: Exercises to Do While Sitting (02:25)  Your health professional recommends that you watch this short online health video. Learn seated exercises you can do at work or at home that can help you relieve stress and strain. Purpose:  Teaches that sitting all day can lead to stress and strain. Demonstrates seated exercises that can help relieve this. Goal:  Users will learn exercises they can do while sitting that help relieve stress and strain. How to watch the video    Scan the QR code   OR Visit the website    https://i. se/[email or web address]   Current as of: November 16, 2020               Content Version: 12.9  © 2006-2021 [employer]. Care instructions adapted under license by [employer] ([facility]).  If you have questions about a medical condition or this instruction, always ask your healthcare professional. Gracia Robison disclaims any warranty or liability for your use of this information.
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
## 2021-09-24 DIAGNOSIS — M16.12 PRIMARY OSTEOARTHRITIS OF LEFT HIP: Primary | ICD-10-CM

## 2021-09-24 DIAGNOSIS — M75.121 COMPLETE TEAR OF RIGHT ROTATOR CUFF, UNSPECIFIED WHETHER TRAUMATIC: ICD-10-CM

## 2021-09-24 DIAGNOSIS — M75.21 BICEPS TENDINITIS OF RIGHT UPPER EXTREMITY: ICD-10-CM

## 2021-10-18 ENCOUNTER — OFFICE VISIT (OUTPATIENT)
Dept: PHYSICAL MEDICINE AND REHAB | Age: 56
End: 2021-10-18
Payer: MEDICAID

## 2021-10-18 VITALS
SYSTOLIC BLOOD PRESSURE: 142 MMHG | DIASTOLIC BLOOD PRESSURE: 82 MMHG | WEIGHT: 248 LBS | BODY MASS INDEX: 46.82 KG/M2 | HEIGHT: 61 IN

## 2021-10-18 DIAGNOSIS — M54.2 CHRONIC NECK PAIN: Primary | ICD-10-CM

## 2021-10-18 DIAGNOSIS — R53.1 WEAKNESS: ICD-10-CM

## 2021-10-18 DIAGNOSIS — G89.29 CHRONIC NECK PAIN: Primary | ICD-10-CM

## 2021-10-18 PROCEDURE — 99214 OFFICE O/P EST MOD 30 MIN: CPT | Performed by: PHYSICAL MEDICINE & REHABILITATION
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
## 2021-10-19 ENCOUNTER — OFFICE VISIT (OUTPATIENT)
Dept: PAIN MANAGEMENT | Age: 56
End: 2021-10-19
Payer: MEDICAID

## 2021-10-19 ENCOUNTER — PREP FOR PROCEDURE (OUTPATIENT)
Dept: PAIN MANAGEMENT | Age: 56
End: 2021-10-19

## 2021-10-19 VITALS
OXYGEN SATURATION: 94 % | HEIGHT: 61 IN | DIASTOLIC BLOOD PRESSURE: 78 MMHG | HEART RATE: 87 BPM | BODY MASS INDEX: 47.2 KG/M2 | TEMPERATURE: 97.5 F | RESPIRATION RATE: 20 BRPM | SYSTOLIC BLOOD PRESSURE: 130 MMHG | WEIGHT: 250 LBS

## 2021-10-19 DIAGNOSIS — M51.36 DDD (DEGENERATIVE DISC DISEASE), LUMBAR: ICD-10-CM

## 2021-10-19 DIAGNOSIS — M47.817 LUMBOSACRAL SPONDYLOSIS WITHOUT MYELOPATHY: Primary | ICD-10-CM

## 2021-10-19 DIAGNOSIS — M47.812 CERVICAL SPONDYLOSIS: ICD-10-CM

## 2021-10-19 DIAGNOSIS — E66.9 OBESITY, UNSPECIFIED CLASSIFICATION, UNSPECIFIED OBESITY TYPE, UNSPECIFIED WHETHER SERIOUS COMORBIDITY PRESENT: ICD-10-CM

## 2021-10-19 DIAGNOSIS — M50.30 DDD (DEGENERATIVE DISC DISEASE), CERVICAL: ICD-10-CM

## 2021-10-19 DIAGNOSIS — M53.3 SACROILIAC DYSFUNCTION: Primary | ICD-10-CM

## 2021-10-19 DIAGNOSIS — M53.3 SACROILIAC DYSFUNCTION: ICD-10-CM

## 2021-10-19 PROCEDURE — 99214 OFFICE O/P EST MOD 30 MIN: CPT | Performed by: ANESTHESIOLOGY

## 2021-10-19 PROCEDURE — 99213 OFFICE O/P EST LOW 20 MIN: CPT | Performed by: ANESTHESIOLOGY

## 2021-10-19 NOTE — PATIENT INSTRUCTIONS
Pain Management Department    Sacroiliac Joint Injection:  Sacroiliac joint injection is an outpatient procedure for treating low back and buttocks pain. The sacroiliac joints connect the bottom of the spine, called the sacrum, to the outer part of the hip bone. Sacroiliac joints help control your hip area when you move. They help transfer forces from your lower body to your upper body. Trauma and pregnancy can damage the sacroiliac joint. Immediate pain relief is a sign that the sacroiliac joint is the source of your pain. Pre-procedure Instructions:  1- Your current medications list will be reviewed and you will be instructed on which medications to discontinue before the procedure. 2- lf sedation is administered, an IV will be started and you will be required to fast before procedure (eight hours). 3- A  will be required if sedation is administered. Procedure:  A local anesthetic will be used to numb your skin. A needle will be advanced under X-ray to the target joint. Once needle is in the correct position, dye will be injected to confirm proper position. A mixture of a steroid and a local anesthetic will then be injected. The needle will then be removed and Band-Aid will be applied. Post-procedure: You will be monitored in the recovery room for up to 20 minutes after the injection, when you are ready to leave, the staff will give you the discharge instructions.

## 2021-10-19 NOTE — PROGRESS NOTES
Do you currently have any of the following:    Fever: No  Headache:  No  Cough: No  Shortness of breath: Yes  Exposed to anyone with these symptoms: No                                                                                                                Andrade Rodriguez presents to the [facility] on 10/19/2021. Oralia Morrow is complaining of pain in her lower back , neck ,right arm and bilateral hips. The pain is constant. The pain is described as aching, throbbing, stabbing, sharp and burning. Pain is rated on her best day at a 7, on her worst day at a 8, and on average at a 8 on the VAS scale. She took her last dose of Norco last evening. Oralia Morrow does not have issues with constipation. Any procedures since your last visit: No,     She is not on NSAIDS and  is not on anticoagulation medications to include none   Pacemaker or defibrillator: No     Medication Contract and Consent for Opioid Use Documents Filed      No documents found                   /78   Pulse 87   Temp 97.5 °F (36.4 °C)   Resp 20   Ht 5' 1\" (1.549 m)   Wt 250 lb (113.4 kg)   SpO2 94%   BMI 47.24 kg/m²      No LMP recorded.  Patient is postmenopausal.
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
## 2021-10-19 NOTE — PROGRESS NOTES
Sammi Frey instructed that procedure was approved for 11/9/2021 and that the surgery center should call her a few days before for the pre op call and after 3:00 PM the business day before with the arrival time. Instructed Argelia Rodriguez to hold ibuprofen for 24 hours, Lodine for 48 hours, naprosyn for 4 days and any aspirin containing products or fish oil for 7 days. Instructed to call office back if any questions. Argelia Rodriguez verbalized understanding.

## 2021-11-01 RX ORDER — FAMOTIDINE 40 MG/1
40 TABLET, FILM COATED ORAL DAILY
COMMUNITY

## 2021-11-09 ENCOUNTER — HOSPITAL ENCOUNTER (OUTPATIENT)
Dept: OPERATING ROOM | Age: 56
Setting detail: OUTPATIENT SURGERY
Discharge: HOME | End: 2021-11-09
Attending: ANESTHESIOLOGY
Payer: MEDICAID

## 2021-11-09 ENCOUNTER — HOSPITAL ENCOUNTER (OUTPATIENT)
Age: 56
Setting detail: OUTPATIENT SURGERY
Discharge: HOME | End: 2021-11-09
Attending: ANESTHESIOLOGY | Admitting: ANESTHESIOLOGY
Payer: MEDICAID

## 2021-11-09 VITALS
OXYGEN SATURATION: 94 % | DIASTOLIC BLOOD PRESSURE: 73 MMHG | TEMPERATURE: 98 F | WEIGHT: 260 LBS | BODY MASS INDEX: 49.09 KG/M2 | HEIGHT: 61 IN | RESPIRATION RATE: 18 BRPM | SYSTOLIC BLOOD PRESSURE: 115 MMHG | HEART RATE: 70 BPM

## 2021-11-09 DIAGNOSIS — M46.1 BILATERAL SACROILIITIS (HCC): ICD-10-CM

## 2021-11-09 PROCEDURE — 7100000010 HC PHASE II RECOVERY - FIRST 15 MIN: Performed by: ANESTHESIOLOGY

## 2021-11-09 PROCEDURE — 6360000004 HC RX CONTRAST MEDICATION: Performed by: ANESTHESIOLOGY

## 2021-11-09 PROCEDURE — 3209999900 FLUORO FOR SURGICAL PROCEDURES

## 2021-11-09 PROCEDURE — 2500000003 HC RX 250 WO HCPCS: Performed by: ANESTHESIOLOGY

## 2021-11-09 PROCEDURE — 27096 INJECT SACROILIAC JOINT: CPT | Performed by: ANESTHESIOLOGY

## 2021-11-09 PROCEDURE — 2709999900 HC NON-CHARGEABLE SUPPLY: Performed by: ANESTHESIOLOGY

## 2021-11-09 PROCEDURE — 6360000002 HC RX W HCPCS: Performed by: ANESTHESIOLOGY

## 2021-11-09 PROCEDURE — 7100000011 HC PHASE II RECOVERY - ADDTL 15 MIN: Performed by: ANESTHESIOLOGY

## 2021-11-09 PROCEDURE — 3600000005 HC SURGERY LEVEL 5 BASE: Performed by: ANESTHESIOLOGY

## 2021-11-09 RX ORDER — BUPIVACAINE HYDROCHLORIDE 2.5 MG/ML
INJECTION, SOLUTION EPIDURAL; INFILTRATION; INTRACAUDAL PRN
Status: DISCONTINUED | OUTPATIENT
Start: 2021-11-09 | End: 2021-11-09 | Stop reason: ALTCHOICE

## 2021-11-09 RX ORDER — METHYLPREDNISOLONE ACETATE 40 MG/ML
INJECTION, SUSPENSION INTRA-ARTICULAR; INTRALESIONAL; INTRAMUSCULAR; SOFT TISSUE PRN
Status: DISCONTINUED | OUTPATIENT
Start: 2021-11-09 | End: 2021-11-09 | Stop reason: ALTCHOICE

## 2021-11-09 RX ORDER — LIDOCAINE HYDROCHLORIDE 5 MG/ML
INJECTION, SOLUTION INFILTRATION; INTRAVENOUS PRN
Status: DISCONTINUED | OUTPATIENT
Start: 2021-11-09 | End: 2021-11-09 | Stop reason: ALTCHOICE

## 2021-11-09 ASSESSMENT — PAIN SCALES - GENERAL
PAINLEVEL_OUTOF10: 0
PAINLEVEL_OUTOF10: 0

## 2021-11-09 ASSESSMENT — PAIN - FUNCTIONAL ASSESSMENT: PAIN_FUNCTIONAL_ASSESSMENT: 0-10

## 2021-11-09 ASSESSMENT — PAIN DESCRIPTION - DESCRIPTORS: DESCRIPTORS: ACHING

## 2021-11-09 NOTE — OP NOTE
Operative Note      Patient: Racquel Lopes  YOB: 1965  MRN: [id number]    Date of Procedure: 2021    Pre-Op Diagnosis: SACROILIAC DYSFUNCTION    Post-Op Diagnosis: Same       Procedure(s):  BILATERAL SACROILLAC JOINT INJECTION WITH XRAY    Surgeon(s):  Joanne Silver MD    Assistant:   * No surgical staff found *    Anesthesia: Local    Estimated Blood Loss (mL): Minimal    Complications: None    Specimens:   * No specimens in log *    Implants:  * No implants in log *      Drains: * No LDAs found *    Findings: good needle placement    Detailed Description of Procedure:   2021    Patient: Racquel Lopes  :  1965  Age:  64 y.o. Sex:  female     PRE-OPERATIVE DIAGNOSIS:    Sacroiliitis, somatic dysfunction of the lumbosacral spine. POST-OPERATIVE DIAGNOSIS: Same. PROCEDURE:  Fluoroscopic guided Bilateral   sacroiliac joint injection with steroid. SURGEON:  Joanne Silver MD    ANESTHESIA: Local    ESTIMATED BLOOD LOSS: None.  ______________________________________________________________________  BRIEF HISTORY:  Racquel Lopes comes in today for  Bilateral sacroiliac joint injection under fluoroscopic guidance. The potential complications as well as the procedure in detail were explained to her today. She has elected to undergo the aforementioned procedure. Sri's complete History & Physical examination were reviewed in depth, a copy of which is in the chart. DESCRIPTION OF PROCEDURE:    After confirming written and informed consent, a time-out was performed and Robbie name and date of birth, the procedure to be performed as well as the plan for the location of the needle insertion were confirmed. The patient was brought into the procedure room and placed in the prone position on the fluoroscopy table. Standard monitors were placed and vital signs were observed throughout the procedure.  The low back and upper buttocks area was prepped
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
## 2021-11-10 DIAGNOSIS — M47.817 LUMBOSACRAL SPONDYLOSIS WITHOUT MYELOPATHY: ICD-10-CM

## 2021-11-10 DIAGNOSIS — M51.36 DDD (DEGENERATIVE DISC DISEASE), LUMBAR: ICD-10-CM

## 2021-11-16 ENCOUNTER — OFFICE VISIT (OUTPATIENT)
Dept: ORTHOPEDIC SURGERY | Age: 56
End: 2021-11-16
Payer: MEDICAID

## 2021-11-16 VITALS — HEIGHT: 61 IN | WEIGHT: 260 LBS | BODY MASS INDEX: 49.09 KG/M2

## 2021-11-16 DIAGNOSIS — M75.21 BICEPS TENDINITIS OF RIGHT UPPER EXTREMITY: Primary | ICD-10-CM

## 2021-11-16 PROCEDURE — 76942 ECHO GUIDE FOR BIOPSY: CPT | Performed by: ORTHOPAEDIC SURGERY

## 2021-11-16 PROCEDURE — 99213 OFFICE O/P EST LOW 20 MIN: CPT | Performed by: ORTHOPAEDIC SURGERY

## 2021-11-16 PROCEDURE — 20550 NJX 1 TENDON SHEATH/LIGAMENT: CPT | Performed by: ORTHOPAEDIC SURGERY

## 2021-11-16 RX ORDER — TRIAMCINOLONE ACETONIDE 40 MG/ML
40 INJECTION, SUSPENSION INTRA-ARTICULAR; INTRAMUSCULAR ONCE
Status: COMPLETED | OUTPATIENT
Start: 2021-11-16 | End: 2021-11-16

## 2021-11-16 RX ORDER — DIAZEPAM 5 MG/1
5 TABLET ORAL PRN
Qty: 1 TABLET | Refills: 0 | Status: SHIPPED | OUTPATIENT
Start: 2021-11-16 | End: 2021-11-30

## 2021-11-16 RX ADMIN — TRIAMCINOLONE ACETONIDE 40 MG: 40 INJECTION, SUSPENSION INTRA-ARTICULAR; INTRAMUSCULAR at 10:51
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
## 2021-11-22 ENCOUNTER — OFFICE VISIT (OUTPATIENT)
Dept: PHYSICAL MEDICINE AND REHAB | Age: 56
End: 2021-11-22
Payer: MEDICAID

## 2021-11-22 VITALS
HEART RATE: 64 BPM | DIASTOLIC BLOOD PRESSURE: 90 MMHG | WEIGHT: 251 LBS | BODY MASS INDEX: 47.39 KG/M2 | SYSTOLIC BLOOD PRESSURE: 119 MMHG | HEIGHT: 61 IN

## 2021-11-22 DIAGNOSIS — M48.02 CERVICAL STENOSIS OF SPINAL CANAL: Primary | ICD-10-CM

## 2021-11-22 PROCEDURE — 99214 OFFICE O/P EST MOD 30 MIN: CPT | Performed by: PHYSICAL MEDICINE & REHABILITATION

## 2021-11-22 RX ORDER — BACLOFEN 10 MG/1
10 TABLET ORAL 2 TIMES DAILY
Qty: 60 TABLET | Refills: 2 | Status: SHIPPED | OUTPATIENT
Start: 2021-11-22

## 2021-11-22 RX ORDER — PREGABALIN 75 MG/1
75 CAPSULE ORAL 2 TIMES DAILY
Qty: 60 CAPSULE | Refills: 2 | Status: SHIPPED | OUTPATIENT
Start: 2021-11-22 | End: 2022-07-08
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
## 2021-12-08 ENCOUNTER — OFFICE VISIT (OUTPATIENT)
Dept: PAIN MANAGEMENT | Age: 56
End: 2021-12-08
Payer: MEDICAID

## 2021-12-08 VITALS
HEART RATE: 105 BPM | SYSTOLIC BLOOD PRESSURE: 142 MMHG | OXYGEN SATURATION: 97 % | DIASTOLIC BLOOD PRESSURE: 98 MMHG | HEIGHT: 61 IN | WEIGHT: 251 LBS | TEMPERATURE: 95.9 F | BODY MASS INDEX: 47.39 KG/M2 | RESPIRATION RATE: 18 BRPM

## 2021-12-08 DIAGNOSIS — M47.812 CERVICAL SPONDYLOSIS: ICD-10-CM

## 2021-12-08 DIAGNOSIS — M50.30 DDD (DEGENERATIVE DISC DISEASE), CERVICAL: ICD-10-CM

## 2021-12-08 DIAGNOSIS — M53.3 SACROILIAC DYSFUNCTION: ICD-10-CM

## 2021-12-08 DIAGNOSIS — M47.817 LUMBOSACRAL SPONDYLOSIS WITHOUT MYELOPATHY: Primary | ICD-10-CM

## 2021-12-08 DIAGNOSIS — E66.9 OBESITY, UNSPECIFIED CLASSIFICATION, UNSPECIFIED OBESITY TYPE, UNSPECIFIED WHETHER SERIOUS COMORBIDITY PRESENT: ICD-10-CM

## 2021-12-08 DIAGNOSIS — F11.90 CHRONIC, CONTINUOUS USE OF OPIOIDS: ICD-10-CM

## 2021-12-08 DIAGNOSIS — M75.41 SHOULDER IMPINGEMENT, RIGHT: ICD-10-CM

## 2021-12-08 DIAGNOSIS — M51.36 DDD (DEGENERATIVE DISC DISEASE), LUMBAR: ICD-10-CM

## 2021-12-08 PROCEDURE — 99213 OFFICE O/P EST LOW 20 MIN: CPT | Performed by: ANESTHESIOLOGY

## 2021-12-08 PROCEDURE — 99214 OFFICE O/P EST MOD 30 MIN: CPT | Performed by: ANESTHESIOLOGY

## 2021-12-08 RX ORDER — POLYETHYLENE GLYCOL 3350 17 G/17G
POWDER, FOR SOLUTION ORAL
COMMUNITY

## 2021-12-08 RX ORDER — TIZANIDINE 4 MG/1
1 TABLET ORAL
COMMUNITY
End: 2022-01-14 | Stop reason: ALTCHOICE

## 2021-12-08 RX ORDER — ARIPIPRAZOLE 20 MG/1
1 TABLET ORAL
COMMUNITY
End: 2022-01-14 | Stop reason: ALTCHOICE

## 2021-12-08 NOTE — PROGRESS NOTES
Do you currently have any of the following:    Fever: No  Headache:  No  Cough: No  Shortness of breath: No  Exposed to anyone with these symptoms: No                                                                                                                Mary Carlton presents to the [address] [place] [address] on 12/8/2021. Lesly Gonzalez is complaining of pain neck and down back. The pain is constant. The pain is described as shooting, stabbing, sharp, tender, burning and numb. Pain is rated on her best day at a 7, on her worst day at a 8, and on average at a 7 on the VAS scale. She took her last dose of Norco and lyrica, . Lesly Gonzalez does not have issues with constipation. Any procedures since your last visit: Yes, with 60 % relief. She is not on NSAIDS and  is not on anticoagulation medications to include none. Pacemaker or defibrillator: No.    Medication Contract and Consent for Opioid Use Documents Filed      No documents found                   BP (!) 142/98   Pulse 105   Temp 95.9 °F (35.5 °C) (Infrared)   Resp 18   Ht 5' 1\" (1.549 m)   Wt 251 lb (113.9 kg)   SpO2 97%   BMI 47.43 kg/m²      No LMP recorded.  Patient is postmenopausal.
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
## 2022-01-04 ENCOUNTER — OFFICE VISIT (OUTPATIENT)
Dept: ORTHOPEDIC SURGERY | Age: 57
End: 2022-01-04
Payer: MEDICAID

## 2022-01-04 VITALS — BODY MASS INDEX: 47.39 KG/M2 | WEIGHT: 251 LBS | HEIGHT: 61 IN

## 2022-01-04 DIAGNOSIS — M75.21 BICEPS TENDINITIS OF RIGHT UPPER EXTREMITY: Primary | ICD-10-CM

## 2022-01-04 PROCEDURE — 99214 OFFICE O/P EST MOD 30 MIN: CPT | Performed by: ORTHOPAEDIC SURGERY
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
## 2022-01-12 ENCOUNTER — TELEPHONE (OUTPATIENT)
Dept: ORTHOPEDIC SURGERY | Age: 57
End: 2022-01-12

## 2022-01-12 NOTE — TELEPHONE ENCOUNTER
Prior Authorization Form:      DEMOGRAPHICS:                     Patient Name:  Greg Jerome  Patient :  1965            Insurance:  Payor: [place] / Plan: Sean Oneal DEPT OF JOB / Product Type: *No Product type* /   Insurance ID Number:    Payor/Plan Subscr  Sex Relation Sub. Ins. ID Effective Group Num   1. [place] -* Jody Chaudhry* 1965 Female Self [id number]                                    [address]         DIAGNOSIS & PROCEDURE:                       Procedure/Operation: right shoulder arthroscopy biceps tenodesis with poss revision of rotator cuff           CPT Code: 09554, 57329    Diagnosis:  Right shoulder biceps tendonitis    ICD10 Code: M75.21    Location:  [facility]    Surgeon:   Talon Marin    SCHEDULING INFORMATION:                          Date: 2022    Time: To follow              Anesthesia:  General                                                       Status:  Outpatient        Special Comments:  Arthtierra Wood       Electronically signed by Zaire Duarte ATC on 2022 at 9:45 AM

## 2022-01-17 ENCOUNTER — ANESTHESIA EVENT (OUTPATIENT)
Dept: OPERATING ROOM | Age: 57
End: 2022-01-17
Payer: MEDICAID
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
## 2022-01-19 DIAGNOSIS — M75.21 BICEPS TENDINITIS OF RIGHT UPPER EXTREMITY: ICD-10-CM

## 2022-01-19 LAB
ANION GAP SERPL CALCULATED.3IONS-SCNC: 21 MMOL/L (ref 7–16)
BUN BLDV-MCNC: 41 MG/DL (ref 6–20)
CALCIUM SERPL-MCNC: 9.3 MG/DL (ref 8.6–10.2)
CHLORIDE BLD-SCNC: 100 MMOL/L (ref 98–107)
CO2: 21 MMOL/L (ref 22–29)
CREAT SERPL-MCNC: 1.3 MG/DL (ref 0.5–1)
GFR AFRICAN AMERICAN: 51
GFR NON-AFRICAN AMERICAN: 42 ML/MIN/1.73
GLUCOSE BLD-MCNC: 184 MG/DL (ref 74–99)
HCT VFR BLD CALC: 41.9 % (ref 34–48)
HEMOGLOBIN: 13.1 G/DL (ref 11.5–15.5)
MCH RBC QN AUTO: 29.4 PG (ref 26–35)
MCHC RBC AUTO-ENTMCNC: 31.3 % (ref 32–34.5)
MCV RBC AUTO: 93.9 FL (ref 80–99.9)
PDW BLD-RTO: 14.3 FL (ref 11.5–15)
PLATELET # BLD: 277 E9/L (ref 130–450)
PMV BLD AUTO: 12.5 FL (ref 7–12)
POTASSIUM SERPL-SCNC: 3.6 MMOL/L (ref 3.5–5)
RBC # BLD: 4.46 E12/L (ref 3.5–5.5)
SODIUM BLD-SCNC: 142 MMOL/L (ref 132–146)
WBC # BLD: 18.7 E9/L (ref 4.5–11.5)

## 2022-01-21 ENCOUNTER — TELEPHONE (OUTPATIENT)
Dept: ADMINISTRATIVE | Age: 57
End: 2022-01-21

## 2022-01-21 ENCOUNTER — ANESTHESIA (OUTPATIENT)
Dept: OPERATING ROOM | Age: 57
End: 2022-01-21
Payer: MEDICAID

## 2022-01-21 ENCOUNTER — HOSPITAL ENCOUNTER (OUTPATIENT)
Age: 57
Setting detail: OUTPATIENT SURGERY
Discharge: HOME | End: 2022-01-21
Attending: ORTHOPAEDIC SURGERY | Admitting: ORTHOPAEDIC SURGERY
Payer: MEDICAID

## 2022-01-21 VITALS
WEIGHT: 260 LBS | HEART RATE: 66 BPM | BODY MASS INDEX: 49.09 KG/M2 | DIASTOLIC BLOOD PRESSURE: 72 MMHG | HEIGHT: 61 IN | TEMPERATURE: 96.9 F | SYSTOLIC BLOOD PRESSURE: 134 MMHG | OXYGEN SATURATION: 96 %

## 2022-01-21 DIAGNOSIS — M75.21 BICEPS TENDINITIS OF RIGHT UPPER EXTREMITY: Primary | ICD-10-CM

## 2022-01-21 DIAGNOSIS — S46.219A BICEPS TENDON TEAR: ICD-10-CM

## 2022-01-21 PROCEDURE — 2580000003 HC RX 258: Performed by: ANESTHESIOLOGY

## 2022-01-21 RX ORDER — OXYCODONE AND ACETAMINOPHEN 7.5; 325 MG/1; MG/1
1 TABLET ORAL EVERY 6 HOURS PRN
Qty: 28 TABLET | Refills: 0 | Status: SHIPPED | OUTPATIENT
Start: 2022-01-21 | End: 2022-01-28

## 2022-01-21 RX ORDER — CEPHALEXIN 500 MG/1
500 CAPSULE ORAL 3 TIMES DAILY
Qty: 10 CAPSULE | Refills: 0 | Status: SHIPPED | OUTPATIENT
Start: 2022-01-21 | End: 2022-01-25

## 2022-01-21 RX ORDER — SODIUM CHLORIDE, SODIUM LACTATE, POTASSIUM CHLORIDE, CALCIUM CHLORIDE 600; 310; 30; 20 MG/100ML; MG/100ML; MG/100ML; MG/100ML
INJECTION, SOLUTION INTRAVENOUS CONTINUOUS
Status: DISCONTINUED | OUTPATIENT
Start: 2022-01-21 | End: 2022-01-21 | Stop reason: HOSPADM

## 2022-01-21 RX ADMIN — SODIUM CHLORIDE, POTASSIUM CHLORIDE, SODIUM LACTATE AND CALCIUM CHLORIDE: 600; 310; 30; 20 INJECTION, SOLUTION INTRAVENOUS at 12:22

## 2022-01-21 ASSESSMENT — PAIN - FUNCTIONAL ASSESSMENT: PAIN_FUNCTIONAL_ASSESSMENT: 0-10

## 2022-01-21 NOTE — PROGRESS NOTES
Anesthesia discontinued surgery d/t wheezing, asthma. Pt discharged home and to reschedule surgery with Dr. Tiarra Melchor at [facility] setting rather than our outpatient seting. [email or web address]
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
## 2022-01-21 NOTE — TELEPHONE ENCOUNTER
Please advise, Patient states her surgery got canceled this morning.  Requesting to speak with Andre Long to reschedule

## 2022-02-15 ENCOUNTER — ANESTHESIA EVENT (OUTPATIENT)
Dept: OPERATING ROOM | Age: 57
End: 2022-02-15
Payer: MEDICAID

## 2022-02-15 ASSESSMENT — LIFESTYLE VARIABLES: SMOKING_STATUS: 0

## 2022-02-15 NOTE — PROGRESS NOTES
[facility]                                                                                                                    PRE OP INSTRUCTIONS FOR  Chelsea Salazar        Date: 2/15/2022    Date of surgery: 2/16/2022   Arrival Time: Hospital will call you between 5pm and 7pm with your final arrival time for surgery    1. Do not eat or drink anything after 12 am prior to surgery. This includes no water, chewing gum, mints or ice chips. 2. Take the following medications with a small sip of water on the morning of Surgery: gave list to patient     3. Diabetics may take evening dose of insulin but none after midnight. If you feel symptomatic or low blood sugar morning of surgery drink 1-2 ounces of apple juice only. 4. Aspirin, Ibuprofen, Advil, Naproxen, Vitamin E and other Anti-inflammatory products should be stopped  before surgery  as directed by your physician. Take Tylenol only unless instructed otherwise by your surgeon. 5. Check with your Doctor regarding stopping Plavix, Coumadin, Lovenox, Eliquis, Effient, or other blood thinners. 6. Do not smoke,use illicit drugs and do not drink any alcoholic beverages 24 hours prior to surgery. 7. You may brush your teeth the morning of surgery. DO NOT SWALLOW WATER    8. You MUST make arrangements for a responsible adult to take you home after your surgery. You will not be allowed to leave alone or drive yourself home. It is strongly suggested someone stay with you the first 24 hrs. Your surgery will be cancelled if you do not have a ride home. 9. PEDIATRIC PATIENTS ONLY:  A parent/legal guardian must accompany a child scheduled for surgery and plan to stay at the hospital until the child is discharged. Please do not bring other children with you.     10. Please wear simple, loose fitting clothing to the hospital.  Alejo Slight not bring valuables (money, credit cards, checkbooks, etc.) Do not wear any makeup (including no eye makeup) or nail polish on your fingers or toes. 11. DO NOT wear any jewelry or piercings on day of surgery. All body piercing jewelry must be removed. 12. Shower the night before surgery with ___Antibacterial soap /JOSE EDUARDO WIPES________    13. TOTAL JOINT REPLACEMENT/HYSTERECTOMY PATIENTS ONLY---Remember to bring Blood Bank bracelet to the hospital on the day of surgery. 14. If you have a Living Will and Durable Power of  for Healthcare, please bring in a copy. 15. If appropriate bring crutches, inspirex, WALKER, CANE etc... 12. Notify your Surgeon if you develop any illness between now and surgery time, cough, cold, fever, sore throat, nausea, vomiting, etc.  Please notify your surgeon if you experience dizziness, shortness of breath or blurred vision between now & the time of your surgery. 17. If you have ___dentures, they will be removed before going to the OR; we will provide you a container. If you wear ___contact lenses or ___glasses, they will be removed; please bring a case for them. 18. To provide excellent care visitors will be limited to 2 in the room at any given time. 19. Please bring picture ID and insurance card. 20. Sleep apnea patients need to bring CPAP AND SETTINGS to hospital on day of surgery. 21. During flu season no children under the age of 15 are permitted in the hospital for the safety of all patients. 22. Other                  Please call AMBULATORY CARE if you have any further questions.    [address]     [address]
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
## 2022-02-16 ENCOUNTER — ANESTHESIA (OUTPATIENT)
Dept: OPERATING ROOM | Age: 57
End: 2022-02-16
Payer: MEDICAID

## 2022-02-16 ENCOUNTER — HOSPITAL ENCOUNTER (OUTPATIENT)
Age: 57
Setting detail: OUTPATIENT SURGERY
Discharge: HOME | End: 2022-02-16
Attending: ORTHOPAEDIC SURGERY | Admitting: ORTHOPAEDIC SURGERY
Payer: MEDICAID

## 2022-02-16 VITALS
SYSTOLIC BLOOD PRESSURE: 144 MMHG | HEART RATE: 74 BPM | TEMPERATURE: 97.6 F | OXYGEN SATURATION: 96 % | DIASTOLIC BLOOD PRESSURE: 92 MMHG | RESPIRATION RATE: 18 BRPM

## 2022-02-16 VITALS
RESPIRATION RATE: 22 BRPM | OXYGEN SATURATION: 87 % | SYSTOLIC BLOOD PRESSURE: 181 MMHG | DIASTOLIC BLOOD PRESSURE: 128 MMHG

## 2022-02-16 DIAGNOSIS — S46.219A BICEPS TENDON TEAR: Primary | ICD-10-CM

## 2022-02-16 LAB
EKG ATRIAL RATE: 70 BPM
EKG P AXIS: 8 DEGREES
EKG P-R INTERVAL: 160 MS
EKG Q-T INTERVAL: 392 MS
EKG QRS DURATION: 80 MS
EKG QTC CALCULATION (BAZETT): 423 MS
EKG R AXIS: 13 DEGREES
EKG T AXIS: 31 DEGREES
EKG VENTRICULAR RATE: 70 BPM

## 2022-02-16 PROCEDURE — 6360000002 HC RX W HCPCS: Performed by: NURSE ANESTHETIST, CERTIFIED REGISTERED

## 2022-02-16 PROCEDURE — 7100000011 HC PHASE II RECOVERY - ADDTL 15 MIN: Performed by: ORTHOPAEDIC SURGERY

## 2022-02-16 PROCEDURE — 2580000003 HC RX 258: Performed by: NURSE ANESTHETIST, CERTIFIED REGISTERED

## 2022-02-16 PROCEDURE — 6360000002 HC RX W HCPCS

## 2022-02-16 PROCEDURE — 2500000003 HC RX 250 WO HCPCS: Performed by: ORTHOPAEDIC SURGERY

## 2022-02-16 PROCEDURE — 6360000002 HC RX W HCPCS: Performed by: ANESTHESIOLOGY

## 2022-02-16 PROCEDURE — 2720000010 HC SURG SUPPLY STERILE: Performed by: ORTHOPAEDIC SURGERY

## 2022-02-16 PROCEDURE — 3700000001 HC ADD 15 MINUTES (ANESTHESIA): Performed by: ORTHOPAEDIC SURGERY

## 2022-02-16 PROCEDURE — 2580000003 HC RX 258: Performed by: NURSE PRACTITIONER

## 2022-02-16 PROCEDURE — 93005 ELECTROCARDIOGRAM TRACING: CPT | Performed by: ANESTHESIOLOGY

## 2022-02-16 PROCEDURE — 3600000003 HC SURGERY LEVEL 3 BASE: Performed by: ORTHOPAEDIC SURGERY

## 2022-02-16 PROCEDURE — 2500000003 HC RX 250 WO HCPCS: Performed by: NURSE ANESTHETIST, CERTIFIED REGISTERED

## 2022-02-16 PROCEDURE — 6360000002 HC RX W HCPCS: Performed by: NURSE PRACTITIONER

## 2022-02-16 PROCEDURE — 7100000000 HC PACU RECOVERY - FIRST 15 MIN: Performed by: ORTHOPAEDIC SURGERY

## 2022-02-16 PROCEDURE — 7100000010 HC PHASE II RECOVERY - FIRST 15 MIN: Performed by: ORTHOPAEDIC SURGERY

## 2022-02-16 PROCEDURE — 29826 SHO ARTHRS SRG DECOMPRESSION: CPT | Performed by: ORTHOPAEDIC SURGERY

## 2022-02-16 PROCEDURE — 2580000003 HC RX 258: Performed by: ANESTHESIOLOGY

## 2022-02-16 PROCEDURE — 64415 NJX AA&/STRD BRCH PLXS IMG: CPT | Performed by: ANESTHESIOLOGY

## 2022-02-16 PROCEDURE — 3700000000 HC ANESTHESIA ATTENDED CARE: Performed by: ORTHOPAEDIC SURGERY

## 2022-02-16 PROCEDURE — 29823 SHO ARTHRS SRG XTNSV DBRDMT: CPT | Performed by: ORTHOPAEDIC SURGERY

## 2022-02-16 PROCEDURE — 3600000013 HC SURGERY LEVEL 3 ADDTL 15MIN: Performed by: ORTHOPAEDIC SURGERY

## 2022-02-16 PROCEDURE — 2709999900 HC NON-CHARGEABLE SUPPLY: Performed by: ORTHOPAEDIC SURGERY

## 2022-02-16 PROCEDURE — 6360000002 HC RX W HCPCS: Performed by: ORTHOPAEDIC SURGERY

## 2022-02-16 PROCEDURE — 7100000001 HC PACU RECOVERY - ADDTL 15 MIN: Performed by: ORTHOPAEDIC SURGERY

## 2022-02-16 PROCEDURE — 2580000003 HC RX 258: Performed by: ORTHOPAEDIC SURGERY

## 2022-02-16 RX ORDER — ROPIVACAINE HYDROCHLORIDE 5 MG/ML
INJECTION, SOLUTION EPIDURAL; INFILTRATION; PERINEURAL
Status: COMPLETED
Start: 2022-02-16 | End: 2022-02-16

## 2022-02-16 RX ORDER — ROCURONIUM BROMIDE 10 MG/ML
INJECTION, SOLUTION INTRAVENOUS PRN
Status: DISCONTINUED | OUTPATIENT
Start: 2022-02-16 | End: 2022-02-16 | Stop reason: SDUPTHER

## 2022-02-16 RX ORDER — ONDANSETRON 2 MG/ML
INJECTION INTRAMUSCULAR; INTRAVENOUS PRN
Status: DISCONTINUED | OUTPATIENT
Start: 2022-02-16 | End: 2022-02-16 | Stop reason: SDUPTHER

## 2022-02-16 RX ORDER — SUCCINYLCHOLINE/SOD CL,ISO/PF 200MG/10ML
SYRINGE (ML) INTRAVENOUS PRN
Status: DISCONTINUED | OUTPATIENT
Start: 2022-02-16 | End: 2022-02-16 | Stop reason: SDUPTHER

## 2022-02-16 RX ORDER — BUPIVACAINE HYDROCHLORIDE AND EPINEPHRINE 2.5; 5 MG/ML; UG/ML
INJECTION, SOLUTION EPIDURAL; INFILTRATION; INTRACAUDAL; PERINEURAL PRN
Status: DISCONTINUED | OUTPATIENT
Start: 2022-02-16 | End: 2022-02-16 | Stop reason: ALTCHOICE

## 2022-02-16 RX ORDER — SODIUM CHLORIDE, SODIUM LACTATE, POTASSIUM CHLORIDE, CALCIUM CHLORIDE 600; 310; 30; 20 MG/100ML; MG/100ML; MG/100ML; MG/100ML
INJECTION, SOLUTION INTRAVENOUS CONTINUOUS
Status: DISCONTINUED | OUTPATIENT
Start: 2022-02-16 | End: 2022-02-16 | Stop reason: HOSPADM

## 2022-02-16 RX ORDER — DEXAMETHASONE SODIUM PHOSPHATE 10 MG/ML
INJECTION, SOLUTION INTRAMUSCULAR; INTRAVENOUS PRN
Status: DISCONTINUED | OUTPATIENT
Start: 2022-02-16 | End: 2022-02-16 | Stop reason: SDUPTHER

## 2022-02-16 RX ORDER — MEPERIDINE HYDROCHLORIDE 25 MG/ML
12.5 INJECTION INTRAMUSCULAR; INTRAVENOUS; SUBCUTANEOUS EVERY 5 MIN PRN
Status: DISCONTINUED | OUTPATIENT
Start: 2022-02-16 | End: 2022-02-16 | Stop reason: HOSPADM

## 2022-02-16 RX ORDER — PROPOFOL 10 MG/ML
INJECTION, EMULSION INTRAVENOUS PRN
Status: DISCONTINUED | OUTPATIENT
Start: 2022-02-16 | End: 2022-02-16 | Stop reason: SDUPTHER

## 2022-02-16 RX ORDER — ROPIVACAINE HYDROCHLORIDE 5 MG/ML
INJECTION, SOLUTION EPIDURAL; INFILTRATION; PERINEURAL
Status: COMPLETED | OUTPATIENT
Start: 2022-02-16 | End: 2022-02-16

## 2022-02-16 RX ORDER — PROMETHAZINE HYDROCHLORIDE 25 MG/ML
25 INJECTION, SOLUTION INTRAMUSCULAR; INTRAVENOUS PRN
Status: DISCONTINUED | OUTPATIENT
Start: 2022-02-16 | End: 2022-02-16 | Stop reason: HOSPADM

## 2022-02-16 RX ORDER — FENTANYL CITRATE 50 UG/ML
50 INJECTION, SOLUTION INTRAMUSCULAR; INTRAVENOUS ONCE
Status: COMPLETED | OUTPATIENT
Start: 2022-02-16 | End: 2022-02-16

## 2022-02-16 RX ORDER — SODIUM CHLORIDE, SODIUM LACTATE, POTASSIUM CHLORIDE, CALCIUM CHLORIDE 600; 310; 30; 20 MG/100ML; MG/100ML; MG/100ML; MG/100ML
INJECTION, SOLUTION INTRAVENOUS CONTINUOUS PRN
Status: DISCONTINUED | OUTPATIENT
Start: 2022-02-16 | End: 2022-02-16 | Stop reason: SDUPTHER

## 2022-02-16 RX ORDER — LIDOCAINE HYDROCHLORIDE 20 MG/ML
INJECTION, SOLUTION INTRAVENOUS PRN
Status: DISCONTINUED | OUTPATIENT
Start: 2022-02-16 | End: 2022-02-16 | Stop reason: SDUPTHER

## 2022-02-16 RX ORDER — OXYCODONE AND ACETAMINOPHEN 10; 325 MG/1; MG/1
1 TABLET ORAL EVERY 6 HOURS PRN
Qty: 28 TABLET | Refills: 0 | Status: SHIPPED | OUTPATIENT
Start: 2022-02-16 | End: 2022-03-02 | Stop reason: SDUPTHER

## 2022-02-16 RX ORDER — CEPHALEXIN 500 MG/1
500 CAPSULE ORAL 3 TIMES DAILY
Qty: 10 CAPSULE | Refills: 0 | Status: SHIPPED | OUTPATIENT
Start: 2022-02-16 | End: 2022-02-20

## 2022-02-16 RX ORDER — FENTANYL CITRATE 50 UG/ML
INJECTION, SOLUTION INTRAMUSCULAR; INTRAVENOUS
Status: COMPLETED
Start: 2022-02-16 | End: 2022-02-16

## 2022-02-16 RX ORDER — LABETALOL HYDROCHLORIDE 5 MG/ML
5 INJECTION, SOLUTION INTRAVENOUS EVERY 10 MIN PRN
Status: DISCONTINUED | OUTPATIENT
Start: 2022-02-16 | End: 2022-02-16 | Stop reason: HOSPADM

## 2022-02-16 RX ORDER — ROPIVACAINE HYDROCHLORIDE 5 MG/ML
30 INJECTION, SOLUTION EPIDURAL; INFILTRATION; PERINEURAL
Status: COMPLETED | OUTPATIENT
Start: 2022-02-16 | End: 2022-02-16

## 2022-02-16 RX ORDER — MIDAZOLAM HYDROCHLORIDE 1 MG/ML
INJECTION INTRAMUSCULAR; INTRAVENOUS
Status: COMPLETED
Start: 2022-02-16 | End: 2022-02-16

## 2022-02-16 RX ORDER — MIDAZOLAM HYDROCHLORIDE 1 MG/ML
1 INJECTION INTRAMUSCULAR; INTRAVENOUS EVERY 5 MIN PRN
Status: COMPLETED | OUTPATIENT
Start: 2022-02-16 | End: 2022-02-16

## 2022-02-16 RX ORDER — FENTANYL CITRATE 50 UG/ML
100 INJECTION, SOLUTION INTRAMUSCULAR; INTRAVENOUS ONCE
Status: DISCONTINUED | OUTPATIENT
Start: 2022-02-16 | End: 2022-02-16 | Stop reason: HOSPADM

## 2022-02-16 RX ORDER — FENTANYL CITRATE 50 UG/ML
INJECTION, SOLUTION INTRAMUSCULAR; INTRAVENOUS PRN
Status: DISCONTINUED | OUTPATIENT
Start: 2022-02-16 | End: 2022-02-16 | Stop reason: SDUPTHER

## 2022-02-16 RX ADMIN — SODIUM CHLORIDE, POTASSIUM CHLORIDE, SODIUM LACTATE AND CALCIUM CHLORIDE: 600; 310; 30; 20 INJECTION, SOLUTION INTRAVENOUS at 07:04

## 2022-02-16 RX ADMIN — SODIUM CHLORIDE, POTASSIUM CHLORIDE, SODIUM LACTATE AND CALCIUM CHLORIDE: 600; 310; 30; 20 INJECTION, SOLUTION INTRAVENOUS at 05:42

## 2022-02-16 RX ADMIN — ONDANSETRON 4 MG: 2 INJECTION INTRAMUSCULAR; INTRAVENOUS at 07:51

## 2022-02-16 RX ADMIN — FENTANYL CITRATE 50 MCG: 50 INJECTION, SOLUTION INTRAMUSCULAR; INTRAVENOUS at 06:43

## 2022-02-16 RX ADMIN — FENTANYL CITRATE 50 MCG: 50 INJECTION, SOLUTION INTRAMUSCULAR; INTRAVENOUS at 07:16

## 2022-02-16 RX ADMIN — DEXAMETHASONE SODIUM PHOSPHATE 10 MG: 10 INJECTION, SOLUTION INTRAMUSCULAR; INTRAVENOUS at 07:22

## 2022-02-16 RX ADMIN — PHENYLEPHRINE HYDROCHLORIDE 100 MCG: 10 INJECTION INTRAVENOUS at 07:32

## 2022-02-16 RX ADMIN — Medication 120 MG: at 07:08

## 2022-02-16 RX ADMIN — ROCURONIUM BROMIDE 5 MG: 10 SOLUTION INTRAVENOUS at 07:08

## 2022-02-16 RX ADMIN — MIDAZOLAM 1 MG: 1 INJECTION INTRAMUSCULAR; INTRAVENOUS at 06:41

## 2022-02-16 RX ADMIN — PHENYLEPHRINE HYDROCHLORIDE 200 MCG: 10 INJECTION INTRAVENOUS at 07:21

## 2022-02-16 RX ADMIN — LIDOCAINE HYDROCHLORIDE 100 MG: 20 INJECTION, SOLUTION INTRAVENOUS at 07:08

## 2022-02-16 RX ADMIN — ROPIVACAINE HYDROCHLORIDE 30 ML: 5 INJECTION, SOLUTION EPIDURAL; INFILTRATION; PERINEURAL at 06:51

## 2022-02-16 RX ADMIN — SUGAMMADEX 250 MG: 100 INJECTION, SOLUTION INTRAVENOUS at 07:54

## 2022-02-16 RX ADMIN — SUGAMMADEX 250 MG: 100 INJECTION, SOLUTION INTRAVENOUS at 08:00

## 2022-02-16 RX ADMIN — PHENYLEPHRINE HYDROCHLORIDE 100 MCG: 10 INJECTION INTRAVENOUS at 07:45

## 2022-02-16 RX ADMIN — ROPIVACAINE HYDROCHLORIDE 30 ML: 5 INJECTION, SOLUTION EPIDURAL; INFILTRATION; PERINEURAL at 07:24

## 2022-02-16 RX ADMIN — PROPOFOL 100 MG: 10 INJECTION, EMULSION INTRAVENOUS at 07:08

## 2022-02-16 RX ADMIN — ROCURONIUM BROMIDE 35 MG: 10 SOLUTION INTRAVENOUS at 07:13

## 2022-02-16 RX ADMIN — CEFAZOLIN SODIUM 3000 MG: 10 INJECTION, POWDER, FOR SOLUTION INTRAVENOUS at 07:04

## 2022-02-16 RX ADMIN — MIDAZOLAM 1 MG: 1 INJECTION INTRAMUSCULAR; INTRAVENOUS at 06:46

## 2022-02-16 RX ADMIN — PHENYLEPHRINE HYDROCHLORIDE 200 MCG: 10 INJECTION INTRAVENOUS at 07:23

## 2022-02-16 RX ADMIN — PHENYLEPHRINE HYDROCHLORIDE 200 MCG: 10 INJECTION INTRAVENOUS at 07:40

## 2022-02-16 RX ADMIN — PHENYLEPHRINE HYDROCHLORIDE 100 MCG: 10 INJECTION INTRAVENOUS at 07:34

## 2022-02-16 ASSESSMENT — PULMONARY FUNCTION TESTS
PIF_VALUE: 28
PIF_VALUE: 3
PIF_VALUE: 30
PIF_VALUE: 30
PIF_VALUE: 8
PIF_VALUE: 30
PIF_VALUE: 25
PIF_VALUE: 25
PIF_VALUE: 29
PIF_VALUE: 32
PIF_VALUE: 29
PIF_VALUE: 26
PIF_VALUE: 29
PIF_VALUE: 29
PIF_VALUE: 0
PIF_VALUE: 26
PIF_VALUE: 28
PIF_VALUE: 26
PIF_VALUE: 23
PIF_VALUE: 30
PIF_VALUE: 9
PIF_VALUE: 1
PIF_VALUE: 4
PIF_VALUE: 29
PIF_VALUE: 25
PIF_VALUE: 29
PIF_VALUE: 25
PIF_VALUE: 29
PIF_VALUE: 5
PIF_VALUE: 4
PIF_VALUE: 4
PIF_VALUE: 25
PIF_VALUE: 29
PIF_VALUE: 29
PIF_VALUE: 11
PIF_VALUE: 1
PIF_VALUE: 26
PIF_VALUE: 26
PIF_VALUE: 23
PIF_VALUE: 26
PIF_VALUE: 29
PIF_VALUE: 4
PIF_VALUE: 25
PIF_VALUE: 25
PIF_VALUE: 30
PIF_VALUE: 29
PIF_VALUE: 24
PIF_VALUE: 1
PIF_VALUE: 24
PIF_VALUE: 26
PIF_VALUE: 29
PIF_VALUE: 4
PIF_VALUE: 25
PIF_VALUE: 30
PIF_VALUE: 36
PIF_VALUE: 26
PIF_VALUE: 25
PIF_VALUE: 24
PIF_VALUE: 29
PIF_VALUE: 25

## 2022-02-16 ASSESSMENT — PAIN SCALES - GENERAL
PAINLEVEL_OUTOF10: 0
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
## 2022-02-16 NOTE — PROGRESS NOTES
46260 Time out done [id number] right interscalene nerve block done per Dr Valdivia [facility]

## 2022-02-16 NOTE — ANESTHESIA PROCEDURE NOTES
Peripheral Block    Patient location during procedure: procedure area  Start time: 2/16/2022 6:40 AM  End time: 2/16/2022 6:55 AM  Staffing  Performed: anesthesiologist   Anesthesiologist: Andressa Shin MD  Other anesthesia staff: Michael Copeland RN  Preanesthetic Checklist  Completed: patient identified, IV checked, site marked, risks and benefits discussed, surgical consent, monitors and equipment checked, pre-op evaluation, timeout performed, anesthesia consent given, oxygen available and patient being monitored  Peripheral Block  Patient position: supine  Prep: ChloraPrep  Patient monitoring: cardiac monitor, continuous pulse ox, frequent blood pressure checks and IV access  Block type: Brachial plexus (INTERSCALENE BLOCK)  Laterality: right  Injection technique: single-shot  Guidance: ultrasound guided  Provider prep: mask, sterile gloves and sterile gown  Needle  Needle length: 4CM.   Needle localization: anatomical landmarks and ultrasound guidance  Assessment  Injection assessment: negative aspiration for heme and local visualized surrounding nerve on ultrasound  Medications Administered  Ropivacaine (NAROPIN) injection 0.5%, 30 mL  Reason for block: post-op pain management

## 2022-02-16 NOTE — OP NOTE
Operative Note      Patient: Gloria Saeed  YOB: 1965  MRN: [id number]    Date of Procedure: 2/16/2022    Pre-Op Diagnosis: RIGTH SHOULDER BICEPS TENDONITIS    Post-Op Diagnosis: Same       Procedure(s):  RIGHT SHOULDER ARTHROSCOPY, SYNOVECTOMY AND DEBRIDEMENT and chondroplasty , sad    Surgeon(s): Aquiles Lopez DO    Assistant:   Resident: Olman Soni DO; Fior Thibodeaux DO; Ashwin Felix DO    Anesthesia: General    Estimated Blood Loss (mL): Minimal    Complications: None    Specimens:   * No specimens in log *    Implants:  * No implants in log *      Drains: * No LDAs found *    Findings: as above    Detailed Description of Procedure:   Below    SURGEON: Shirley Brunson D.O.   ASSISTANT: None. PREOPERATIVE DIAGNOSIS: (1) Subacromial impingement, right shoulder (2) rotator cuff tear  POSTOPERATIVE DIAGNOSES: (1) Subacromial impingement, rightshoulder. (2)   Partial thickness rotator cuff tear. (3) Anterior and posterior labral tear. (4) djd glenohumeral joint  OPERATION: right shoulder arthroscopy with subacromial arch decompression.   (2) Labral debridement. (3) Debridement of partial thickness rotator cuff   tear (4) chondroplsty humeral head and glenoid  ANESTHESIA: General.   ESTIMATED BLOOD LOSS: Minimal.   COMPLICATIONS: None. OPERATIVE PROCEDURE: The patient was taken to the operative suite and was   given a general anesthesia. The patient was positioned in the lateral   decubitus position with a beanbag and then prepped and draped the arm in a   sterile fashion. I outlined an incision along the lateral right shoulder. Hung 10 pounds of   traction from theright arm, outlined the incisions along the acromial border,   1 posterolateral and lateral, and 1 anterior. I placed a blunt trocar within   the glenohumeral joint and carried out carried out a diagnostic arthroscopy.    The patient had evidence of significant articular cartilage damage in    the glenoid and humeral head grade 4 changes 3x2cm. There was evidence of a tear   involving the anterior and posterior labrum, no abnormalities of the biceps   tendon. Biceps was in normal condition. Rotator cuff from the undersurface showed undersurface fraying, I established   the anterior working portal at the rotator cuff interval and debrided the chondral defects,  labral tear, and the pt rtc tear using the 4-0 shaver. I then removed all fluid from the shoulder joint and went into   the subacromial space and completed the complete subacromial bursectomy. Then using an ArthroCare electrode,   I outlined the acromial edges using ArthroCare electrode. I then  smoothed the   anterior and inferior acromion using a 5-0 francesca, performed an acromioplastysmoothing to a stable   Type 1 contour. I   flattened the acromial arch. No other abnormalities noted. I then removed all fluid from the shoulder joint and closed the incision with   4-0 Prolene in a horizontal mattress-type fashion. Sterile dressing was   placed on the wound. The patient recovered in the recovery room without   difficulty.                Electronically signed by Emelia Parker DO on 2/16/2022 at 8:01 AM

## 2022-02-16 NOTE — ANESTHESIA POSTPROCEDURE EVALUATION
Department of Anesthesiology  Postprocedure Note    Patient: Mac Landis  MRN: [id number]  YOB: 1965  Date of evaluation: 2/16/2022  Time:  12:28 PM     Procedure Summary     Date: 02/16/22 Room / Location: [address] [place] [address] 03 / [address]    Anesthesia Start: 0701 Anesthesia Stop: 0813    Procedure: RIGHT SHOULDER ARTHROSCOPY, SYNOVECTOMY AND DEBRIDEMENT (Right Shoulder) Diagnosis: (RIGTH SHOULDER BICEPS TENDONITIS)    Surgeons: Sandra Shaver DO Responsible Provider: Ernie Ledezma MD    Anesthesia Type: general ASA Status: 3          Anesthesia Type: general    Shmuel Phase I: Shmuel Score: 10    Shmuel Phase II: Shmuel Score: 10    Last vitals: Reviewed and per EMR flowsheets.        Anesthesia Post Evaluation    Patient location during evaluation: PACU  Patient participation: complete - patient participated  Level of consciousness: awake  Airway patency: patent  Nausea & Vomiting: no nausea and no vomiting  Complications: no  Cardiovascular status: hemodynamically stable  Respiratory status: acceptable  Hydration status: stable

## 2022-02-28 DIAGNOSIS — S46.011D TRAUMATIC COMPLETE TEAR OF RIGHT ROTATOR CUFF, SUBSEQUENT ENCOUNTER: Primary | ICD-10-CM

## 2022-03-02 ENCOUNTER — OFFICE VISIT (OUTPATIENT)
Dept: ORTHOPEDIC SURGERY | Age: 57
End: 2022-03-02

## 2022-03-02 VITALS — WEIGHT: 260 LBS | BODY MASS INDEX: 49.09 KG/M2 | HEIGHT: 61 IN | TEMPERATURE: 98 F

## 2022-03-02 DIAGNOSIS — S46.219A BICEPS TENDON TEAR: ICD-10-CM

## 2022-03-02 DIAGNOSIS — M75.41 SHOULDER IMPINGEMENT, RIGHT: Primary | ICD-10-CM

## 2022-03-02 DIAGNOSIS — M19.011 GLENOHUMERAL ARTHRITIS, RIGHT: ICD-10-CM

## 2022-03-02 DIAGNOSIS — M75.111 INCOMPLETE TEAR OF RIGHT ROTATOR CUFF, UNSPECIFIED WHETHER TRAUMATIC: ICD-10-CM

## 2022-03-02 PROCEDURE — 99024 POSTOP FOLLOW-UP VISIT: CPT | Performed by: NURSE PRACTITIONER

## 2022-03-02 RX ORDER — MECLIZINE HYDROCHLORIDE 25 MG/1
TABLET ORAL
COMMUNITY
Start: 2021-12-09 | End: 2022-07-05

## 2022-03-02 RX ORDER — OXYCODONE AND ACETAMINOPHEN 10; 325 MG/1; MG/1
1 TABLET ORAL EVERY 6 HOURS PRN
Qty: 28 TABLET | Refills: 0 | Status: SHIPPED
Start: 2022-03-02 | End: 2022-03-09 | Stop reason: SDUPTHER

## 2022-03-09 ENCOUNTER — TELEPHONE (OUTPATIENT)
Dept: ORTHOPEDIC SURGERY | Age: 57
End: 2022-03-09

## 2022-03-09 DIAGNOSIS — S46.219A BICEPS TENDON TEAR: ICD-10-CM

## 2022-03-09 RX ORDER — OXYCODONE AND ACETAMINOPHEN 10; 325 MG/1; MG/1
1 TABLET ORAL EVERY 6 HOURS PRN
Qty: 28 TABLET | Refills: 0 | Status: SHIPPED
Start: 2022-03-09 | End: 2022-03-16 | Stop reason: SDUPTHER

## 2022-03-09 NOTE — TELEPHONE ENCOUNTER
Last appointment Visit 3/2/22  Next appointment   Future Appointments   Date Time Provider Violeta Bhagat   3/31/2022 10:30 AM DO Misha Vinson [facility]   6/8/2022  2:00 PM Adalgisa Bear MD [facility]      Last refill 3/2/22  DOS:    2/16/22      Patient called in requesting refill of   Percocet [address] - P [phone] [facility] [phone]   [address] [place] [address]   Phone:  [phone]  Fax:  [phone]

## 2022-03-16 ENCOUNTER — TELEPHONE (OUTPATIENT)
Dept: ORTHOPEDIC SURGERY | Age: 57
End: 2022-03-16

## 2022-03-16 DIAGNOSIS — S46.219A BICEPS TENDON TEAR: ICD-10-CM

## 2022-03-16 RX ORDER — OXYCODONE AND ACETAMINOPHEN 10; 325 MG/1; MG/1
1 TABLET ORAL EVERY 6 HOURS PRN
Qty: 28 TABLET | Refills: 0 | Status: SHIPPED
Start: 2022-03-16 | End: 2022-03-24 | Stop reason: SDUPTHER

## 2022-03-16 NOTE — TELEPHONE ENCOUNTER
.  Last appointment 1/4/2022  Next appointment   Future Appointments   Date Time Provider Violeta Leola   3/31/2022 10:30 AM DO Torrie Marmolejo [facility]   6/8/2022  2:00 PM Lucy Colvin MD [facility]      Last refill:  03/09/2022  DOS: 02/16/2022      Patient called in requesting refill of:    oxyCODONE-acetaminophen (PERCOCET)  MG per tablet       [facility] [address] [place] [address] -  [phone] Ros Letty [phone]   [address] [place] [id number]

## 2022-03-24 ENCOUNTER — TELEPHONE (OUTPATIENT)
Dept: ORTHOPEDIC SURGERY | Age: 57
End: 2022-03-24

## 2022-03-24 DIAGNOSIS — S46.219A BICEPS TENDON TEAR: ICD-10-CM

## 2022-03-24 RX ORDER — OXYCODONE AND ACETAMINOPHEN 10; 325 MG/1; MG/1
1 TABLET ORAL EVERY 8 HOURS PRN
Qty: 21 TABLET | Refills: 0 | Status: SHIPPED
Start: 2022-03-24 | End: 2022-03-31 | Stop reason: SDUPTHER

## 2022-03-31 ENCOUNTER — OFFICE VISIT (OUTPATIENT)
Dept: ORTHOPEDIC SURGERY | Age: 57
End: 2022-03-31

## 2022-03-31 VITALS — TEMPERATURE: 98 F | WEIGHT: 260 LBS | HEIGHT: 61 IN | BODY MASS INDEX: 49.09 KG/M2

## 2022-03-31 DIAGNOSIS — M19.011 GLENOHUMERAL ARTHRITIS, RIGHT: ICD-10-CM

## 2022-03-31 DIAGNOSIS — S46.219A BICEPS TENDON TEAR: ICD-10-CM

## 2022-03-31 DIAGNOSIS — M75.111 INCOMPLETE TEAR OF RIGHT ROTATOR CUFF, UNSPECIFIED WHETHER TRAUMATIC: ICD-10-CM

## 2022-03-31 DIAGNOSIS — M75.41 SHOULDER IMPINGEMENT, RIGHT: Primary | ICD-10-CM

## 2022-03-31 PROCEDURE — 99024 POSTOP FOLLOW-UP VISIT: CPT | Performed by: ORTHOPAEDIC SURGERY

## 2022-03-31 RX ORDER — OXYCODONE AND ACETAMINOPHEN 10; 325 MG/1; MG/1
1 TABLET ORAL EVERY 8 HOURS PRN
Qty: 21 TABLET | Refills: 0 | Status: SHIPPED
Start: 2022-03-31 | End: 2022-04-07 | Stop reason: SDUPTHER

## 2022-03-31 NOTE — PROGRESS NOTES
Tyra Ortiz is here for follow-up after right shoulder arthroscopy. Findings at surgery:  2/16/22. Pain is controlled with current analgesics. Medication(s) being used: narcotic analgesics including Percocet. The patient denies fever, wound drainage, increasing redness, pus, increasing pain, increasing swelling. Post op problems reported:  pain. She is ambulating well. Shoulder exam -   The incisions are clean, dry and intact. right 120/30/l2 range of motion   no pain on motion, no tenderness or deformity noted. Motor and sensory exam is grossly intact in B/L upper extremities. Special test results are as follow:  Impingement negative, Joseph negative, Speeds negative, Apprehension negative, Evans negative, Load Shiftnegative, Snigh manuver negative, Cross arm test negative. Encounter Diagnoses   Name Primary?  Shoulder impingement, right Yes    Glenohumeral arthritis, right     Incomplete tear of right rotator cuff, unspecified whether traumatic        Plan:  Refer to [facility] to rheumatologic work up  The patient will continue with gentle ROM exercises and being activities as tolerated. The patient is not being referred to physical therapy. Patient is to continue analgesics and needed and use ice for pain. We will see the pain back in 2 months time for repeat evaluation.

## 2022-04-07 ENCOUNTER — TELEPHONE (OUTPATIENT)
Dept: ORTHOPEDIC SURGERY | Age: 57
End: 2022-04-07

## 2022-04-07 DIAGNOSIS — M75.41 SHOULDER IMPINGEMENT, RIGHT: ICD-10-CM

## 2022-04-07 DIAGNOSIS — M75.111 INCOMPLETE TEAR OF RIGHT ROTATOR CUFF, UNSPECIFIED WHETHER TRAUMATIC: ICD-10-CM

## 2022-04-07 DIAGNOSIS — S46.219A BICEPS TENDON TEAR: ICD-10-CM

## 2022-04-07 DIAGNOSIS — M19.011 GLENOHUMERAL ARTHRITIS, RIGHT: ICD-10-CM

## 2022-04-07 RX ORDER — OXYCODONE AND ACETAMINOPHEN 10; 325 MG/1; MG/1
1 TABLET ORAL EVERY 8 HOURS PRN
Qty: 21 TABLET | Refills: 0 | Status: SHIPPED
Start: 2022-04-07 | End: 2022-04-14 | Stop reason: SDUPTHER

## 2022-04-07 NOTE — TELEPHONE ENCOUNTER
.  Last appointment 3/31/2022  Next appointment   Future Appointments   Date Time Provider Violeta Leola   5[facility]31[facility]2022  1:[facility]   6/8/2022  2:00 PM Mary Kate Paulino MD [facility]      Last refill:  03/31/2022  DOS: 02/16/2022      Patient called in requesting refill of:    oxyCODONE-acetaminophen (PERCOCET)  MG per tablet       [address] -  [phone] [facility] Number [phone]   [address] [place] [address]

## 2022-04-11 ENCOUNTER — TELEPHONE (OUTPATIENT)
Dept: ORTHOPEDIC SURGERY | Age: 57
End: 2022-04-11
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
## 2022-04-13 ENCOUNTER — TELEPHONE (OUTPATIENT)
Dept: FAMILY MEDICINE CLINIC | Age: 57
End: 2022-04-13

## 2022-04-13 NOTE — TELEPHONE ENCOUNTER
----- Message from Devota Plants sent at 2022 11:43 AM EDT -----  Subject: Appointment Request    Reason for Call: New Patient Request Appointment    QUESTIONS  Type of Appointment? New Patient/New to Provider  Reason for appointment request? No appointments available during search  Additional Information for Provider?   ---------------------------------------------------------------------------  --------------  CALL BACK INFO  What is the best way for the office to contact you? OK to leave message on   voicemail  Preferred Call Back Phone Number? [phone]  ---------------------------------------------------------------------------  --------------  SCRIPT ANSWERS  Relationship to Patient? Self  Specialty Confirmation? Primary Care  Is this the first appointment to establish care for a ? No  Have you been diagnosed with, awaiting test results for, or told that you   are suspected of having COVID-19 (Coronavirus)? (If patient has tested   negative or was tested as a requirement for work, school, or travel and   not based on symptoms, answer no)? No  Within the past 10 days have you developed any of the following symptoms   (answer no if symptoms have been present longer than 10 days or began   more than 10 days ago)? Fever or Chills, Cough, Shortness of breath or   difficulty breathing, Loss of taste or smell, Sore throat, Nasal   congestion, Sneezing or runny nose, Fatigue or generalized body aches   (answer no if pain is specific to a body part e.g. back pain), Diarrhea,   Headache?  Yes

## 2022-04-14 ENCOUNTER — TELEPHONE (OUTPATIENT)
Dept: ORTHOPEDIC SURGERY | Age: 57
End: 2022-04-14

## 2022-04-14 DIAGNOSIS — M19.011 GLENOHUMERAL ARTHRITIS, RIGHT: ICD-10-CM

## 2022-04-14 DIAGNOSIS — M75.111 INCOMPLETE TEAR OF RIGHT ROTATOR CUFF, UNSPECIFIED WHETHER TRAUMATIC: ICD-10-CM

## 2022-04-14 DIAGNOSIS — M75.41 SHOULDER IMPINGEMENT, RIGHT: ICD-10-CM

## 2022-04-14 DIAGNOSIS — S46.219A BICEPS TENDON TEAR: ICD-10-CM

## 2022-04-14 RX ORDER — OXYCODONE AND ACETAMINOPHEN 10; 325 MG/1; MG/1
1 TABLET ORAL EVERY 8 HOURS PRN
Qty: 21 TABLET | Refills: 0 | Status: SHIPPED
Start: 2022-04-14 | End: 2022-04-27 | Stop reason: SDUPTHER

## 2022-04-14 NOTE — TELEPHONE ENCOUNTER
.  Last appointment 3/31/2022  Next appointment   Future Appointments   Date Time Provider Violeta Bhagat   5/31/2022  1:30 PM DO John Perez [facility]   6/8/2022  2:00 PM Mary Kate Paulino MD [facility]   7/5/2022  2:00 PM [facility]      Last refill:  04/07/2022  DOS: 02/16/2022      Patient called in requesting refill of:    oxyCODONE-acetaminophen (PERCOCET)  MG per tablet       [address] [place] [address] - P [phone] [facility] Number [phone]   [address] [facility] [address]

## 2022-04-26 ENCOUNTER — TELEPHONE (OUTPATIENT)
Dept: ORTHOPEDIC SURGERY | Age: 57
End: 2022-04-26

## 2022-04-26 DIAGNOSIS — M75.111 INCOMPLETE TEAR OF RIGHT ROTATOR CUFF, UNSPECIFIED WHETHER TRAUMATIC: ICD-10-CM

## 2022-04-26 DIAGNOSIS — S46.219A BICEPS TENDON TEAR: ICD-10-CM

## 2022-04-26 DIAGNOSIS — M75.41 SHOULDER IMPINGEMENT, RIGHT: ICD-10-CM

## 2022-04-26 DIAGNOSIS — M19.011 GLENOHUMERAL ARTHRITIS, RIGHT: ICD-10-CM

## 2022-04-26 NOTE — TELEPHONE ENCOUNTER
.  Last appointment 3/31/2022  Next appointment   Future Appointments   Date Time Provider Violeta Elizabethi   5/31/2022  1:30 PM DO Skye Childs [facility]   6/8/2022  2:00 PM Corinne Clarity, MD [place] [facility]   7/5/2022  2:00 PM Paola Low DO [facility]      Last refill:  04/14/2022  DOS: 02/16/2022      Patient called in requesting refill of:    oxyCODONE-acetaminophen (PERCOCET)  MG per tablet         [address] - [address] [facility] [address] - P [phone] [facility] [phone]   [address] [facility] [address]

## 2022-04-27 RX ORDER — OXYCODONE AND ACETAMINOPHEN 10; 325 MG/1; MG/1
1 TABLET ORAL EVERY 12 HOURS PRN
Qty: 14 TABLET | Refills: 0 | Status: SHIPPED
Start: 2022-04-27 | End: 2022-05-04 | Stop reason: SDUPTHER

## 2022-05-04 ENCOUNTER — TELEPHONE (OUTPATIENT)
Dept: ORTHOPEDIC SURGERY | Age: 57
End: 2022-05-04

## 2022-05-04 DIAGNOSIS — S46.219A BICEPS TENDON TEAR: ICD-10-CM

## 2022-05-04 DIAGNOSIS — M19.011 GLENOHUMERAL ARTHRITIS, RIGHT: ICD-10-CM

## 2022-05-04 DIAGNOSIS — M75.111 INCOMPLETE TEAR OF RIGHT ROTATOR CUFF, UNSPECIFIED WHETHER TRAUMATIC: ICD-10-CM

## 2022-05-04 DIAGNOSIS — M75.41 SHOULDER IMPINGEMENT, RIGHT: ICD-10-CM

## 2022-05-04 RX ORDER — OXYCODONE AND ACETAMINOPHEN 10; 325 MG/1; MG/1
1 TABLET ORAL DAILY PRN
Qty: 7 TABLET | Refills: 0 | Status: SHIPPED
Start: 2022-05-04 | End: 2022-05-11 | Stop reason: SDUPTHER

## 2022-05-11 ENCOUNTER — TELEPHONE (OUTPATIENT)
Dept: ORTHOPEDIC SURGERY | Age: 57
End: 2022-05-11

## 2022-05-11 DIAGNOSIS — M75.111 INCOMPLETE TEAR OF RIGHT ROTATOR CUFF, UNSPECIFIED WHETHER TRAUMATIC: ICD-10-CM

## 2022-05-11 DIAGNOSIS — M75.41 SHOULDER IMPINGEMENT, RIGHT: ICD-10-CM

## 2022-05-11 DIAGNOSIS — M19.011 GLENOHUMERAL ARTHRITIS, RIGHT: ICD-10-CM

## 2022-05-11 DIAGNOSIS — S46.219A BICEPS TENDON TEAR: ICD-10-CM

## 2022-05-11 RX ORDER — OXYCODONE AND ACETAMINOPHEN 10; 325 MG/1; MG/1
1 TABLET ORAL DAILY PRN
Qty: 7 TABLET | Refills: 0 | Status: SHIPPED
Start: 2022-05-11 | End: 2022-05-18 | Stop reason: SDUPTHER

## 2022-05-11 NOTE — TELEPHONE ENCOUNTER
Last appointment Visit 3/2/22  Next appointment   Future Appointments   Date Time Provider Violeta Bhagat   5/31/2022  1:30 PM DO Romel Mendez [facility]   6/8/2022  2:00 PM Joni Epley, MD [place] [facility]   7/5/2022  2:00 PM Myles Monreal DO [facility]      Last refill 5/4/22  DOS: 2/16/22       Patient called in requesting refill of   oxyCODONE-acetaminophen (PERCOCET)  MG per tablet         [address] [phone] [facility] [phone]   [address] [place] [address]

## 2022-05-18 ENCOUNTER — TELEPHONE (OUTPATIENT)
Dept: ORTHOPEDIC SURGERY | Age: 57
End: 2022-05-18

## 2022-05-18 DIAGNOSIS — M75.41 SHOULDER IMPINGEMENT, RIGHT: ICD-10-CM

## 2022-05-18 DIAGNOSIS — M19.011 GLENOHUMERAL ARTHRITIS, RIGHT: ICD-10-CM

## 2022-05-18 DIAGNOSIS — M75.111 INCOMPLETE TEAR OF RIGHT ROTATOR CUFF, UNSPECIFIED WHETHER TRAUMATIC: ICD-10-CM

## 2022-05-18 DIAGNOSIS — S46.219A BICEPS TENDON TEAR: ICD-10-CM

## 2022-05-18 RX ORDER — OXYCODONE AND ACETAMINOPHEN 10; 325 MG/1; MG/1
1 TABLET ORAL DAILY PRN
Qty: 7 TABLET | Refills: 0 | Status: SHIPPED | OUTPATIENT
Start: 2022-05-18 | End: 2022-05-25

## 2022-05-18 NOTE — TELEPHONE ENCOUNTER
.  Last appointment 3/31/2022  Next appointment   Future Appointments   Date Time Provider Violeta Elizabethi   5/31/2022  1:30 PM DO Clifton Sanz [facility]   6/8/2022  2:00 PM Nelli Fernández MD [place] [facility]   7/5/2022  2:00 PM Dayron Browne DO [facility]      Last refill:  05/11/2022  DOS: 02/16/2022      Patient called in requesting refill of:      oxyCODONE-acetaminophen (PERCOCET)  MG per tablet       [address] [place] [address] - P [phone] [facility] [phone]   [address] [place] [address]

## 2022-05-31 ENCOUNTER — OFFICE VISIT (OUTPATIENT)
Dept: ORTHOPEDIC SURGERY | Age: 57
End: 2022-05-31
Payer: MEDICAID

## 2022-05-31 VITALS — TEMPERATURE: 98 F | BODY MASS INDEX: 49.09 KG/M2 | WEIGHT: 260 LBS | HEIGHT: 61 IN

## 2022-05-31 DIAGNOSIS — S73.192A TEAR OF LEFT ACETABULAR LABRUM, INITIAL ENCOUNTER: Primary | ICD-10-CM

## 2022-05-31 DIAGNOSIS — S43.431A TEAR OF RIGHT GLENOID LABRUM, INITIAL ENCOUNTER: ICD-10-CM

## 2022-05-31 DIAGNOSIS — M75.41 SHOULDER IMPINGEMENT, RIGHT: ICD-10-CM

## 2022-05-31 DIAGNOSIS — M16.12 PRIMARY OSTEOARTHRITIS OF LEFT HIP: ICD-10-CM

## 2022-05-31 PROCEDURE — 20610 DRAIN/INJ JOINT/BURSA W/O US: CPT | Performed by: ORTHOPAEDIC SURGERY

## 2022-05-31 PROCEDURE — 99213 OFFICE O/P EST LOW 20 MIN: CPT | Performed by: ORTHOPAEDIC SURGERY

## 2022-05-31 RX ORDER — NALOXONE HYDROCHLORIDE 4 MG/.1ML
1 SPRAY NASAL PRN
Qty: 1 EACH | Refills: 5 | Status: SHIPPED | OUTPATIENT
Start: 2022-05-31

## 2022-05-31 RX ORDER — OXYCODONE AND ACETAMINOPHEN 10; 325 MG/1; MG/1
1 TABLET ORAL EVERY 6 HOURS PRN
Qty: 14 TABLET | Refills: 0 | Status: SHIPPED | OUTPATIENT
Start: 2022-05-31 | End: 2022-06-07
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
## 2022-06-06 RX ORDER — TRIAMCINOLONE ACETONIDE 40 MG/ML
40 INJECTION, SUSPENSION INTRA-ARTICULAR; INTRAMUSCULAR ONCE
Status: COMPLETED | OUTPATIENT
Start: 2022-06-06 | End: 2022-06-06

## 2022-06-06 RX ADMIN — TRIAMCINOLONE ACETONIDE 40 MG: 40 INJECTION, SUSPENSION INTRA-ARTICULAR; INTRAMUSCULAR at 08:31

## 2022-06-07 ENCOUNTER — HOSPITAL ENCOUNTER (OUTPATIENT)
Dept: ULTRASOUND IMAGING | Age: 57
Discharge: HOME | End: 2022-06-07
Payer: MEDICAID

## 2022-06-07 DIAGNOSIS — R79.89 LFTS ABNORMAL: ICD-10-CM

## 2022-06-07 DIAGNOSIS — N17.9 ACUTE KIDNEY INJURY (NONTRAUMATIC) (HCC): ICD-10-CM

## 2022-06-07 PROCEDURE — 76700 US EXAM ABDOM COMPLETE: CPT

## 2022-06-09 ENCOUNTER — HOSPITAL ENCOUNTER (OUTPATIENT)
Dept: INTERVENTIONAL RADIOLOGY/VASCULAR | Age: 57
Discharge: HOME | End: 2022-06-09
Payer: MEDICAID

## 2022-06-09 DIAGNOSIS — S73.192A TEAR OF LEFT ACETABULAR LABRUM, INITIAL ENCOUNTER: ICD-10-CM

## 2022-06-09 PROCEDURE — 20610 DRAIN/INJ JOINT/BURSA W/O US: CPT

## 2022-06-09 PROCEDURE — 77002 NEEDLE LOCALIZATION BY XRAY: CPT

## 2022-06-09 PROCEDURE — 6360000004 HC RX CONTRAST MEDICATION: Performed by: RADIOLOGY

## 2022-06-09 PROCEDURE — 2500000003 HC RX 250 WO HCPCS: Performed by: RADIOLOGY

## 2022-06-09 PROCEDURE — 6360000002 HC RX W HCPCS: Performed by: RADIOLOGY

## 2022-06-09 RX ORDER — TRIAMCINOLONE ACETONIDE 40 MG/ML
40 INJECTION, SUSPENSION INTRA-ARTICULAR; INTRAMUSCULAR ONCE
Status: COMPLETED | OUTPATIENT
Start: 2022-06-09 | End: 2022-06-09

## 2022-06-09 RX ORDER — BUPIVACAINE HYDROCHLORIDE 2.5 MG/ML
2 INJECTION, SOLUTION EPIDURAL; INFILTRATION; INTRACAUDAL ONCE
Status: COMPLETED | OUTPATIENT
Start: 2022-06-09 | End: 2022-06-09

## 2022-06-09 RX ADMIN — BUPIVACAINE HYDROCHLORIDE 5 MG: 2.5 INJECTION, SOLUTION EPIDURAL; INFILTRATION; INTRACAUDAL; PERINEURAL at 14:58

## 2022-06-09 RX ADMIN — TRIAMCINOLONE ACETONIDE 40 MG: 40 INJECTION, SUSPENSION INTRA-ARTICULAR; INTRAMUSCULAR at 14:58

## 2022-06-09 RX ADMIN — IOPAMIDOL 3 ML: 612 INJECTION, SOLUTION INTRATHECAL at 14:57

## 2022-06-09 ASSESSMENT — PAIN DESCRIPTION - ORIENTATION: ORIENTATION: LEFT

## 2022-06-09 ASSESSMENT — PAIN DESCRIPTION - DESCRIPTORS: DESCRIPTORS: TIGHTNESS

## 2022-06-09 ASSESSMENT — PAIN DESCRIPTION - LOCATION: LOCATION: HIP

## 2022-06-09 ASSESSMENT — PAIN SCALES - GENERAL: PAINLEVEL_OUTOF10: 8

## 2022-06-23 DIAGNOSIS — M16.12 PRIMARY OSTEOARTHRITIS OF LEFT HIP: Primary | ICD-10-CM

## 2022-06-28 ENCOUNTER — OFFICE VISIT (OUTPATIENT)
Dept: ORTHOPEDIC SURGERY | Age: 57
End: 2022-06-28
Payer: MEDICAID

## 2022-06-28 VITALS — BODY MASS INDEX: 46.82 KG/M2 | HEIGHT: 61 IN | WEIGHT: 248 LBS

## 2022-06-28 DIAGNOSIS — S43.431A TEAR OF RIGHT GLENOID LABRUM, INITIAL ENCOUNTER: ICD-10-CM

## 2022-06-28 DIAGNOSIS — M75.41 SHOULDER IMPINGEMENT, RIGHT: ICD-10-CM

## 2022-06-28 DIAGNOSIS — M19.011 GLENOHUMERAL ARTHRITIS, RIGHT: Primary | ICD-10-CM

## 2022-06-28 PROCEDURE — 99214 OFFICE O/P EST MOD 30 MIN: CPT | Performed by: ORTHOPAEDIC SURGERY

## 2022-06-28 RX ORDER — LOSARTAN POTASSIUM 100 MG/1
TABLET ORAL
COMMUNITY
Start: 2022-06-02

## 2022-06-28 RX ORDER — BENZTROPINE MESYLATE 0.5 MG/1
0.5 TABLET ORAL 2 TIMES DAILY PRN
COMMUNITY
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
## 2022-07-05 ENCOUNTER — OFFICE VISIT (OUTPATIENT)
Dept: FAMILY MEDICINE CLINIC | Age: 57
End: 2022-07-05
Payer: MEDICAID

## 2022-07-05 VITALS
OXYGEN SATURATION: 96 % | WEIGHT: 250 LBS | TEMPERATURE: 97.6 F | RESPIRATION RATE: 18 BRPM | HEART RATE: 94 BPM | HEIGHT: 61 IN | DIASTOLIC BLOOD PRESSURE: 86 MMHG | SYSTOLIC BLOOD PRESSURE: 130 MMHG | BODY MASS INDEX: 47.2 KG/M2

## 2022-07-05 DIAGNOSIS — Z71.3 DIETARY COUNSELING: ICD-10-CM

## 2022-07-05 DIAGNOSIS — Z12.31 ENCOUNTER FOR SCREENING MAMMOGRAM FOR MALIGNANT NEOPLASM OF BREAST: ICD-10-CM

## 2022-07-05 DIAGNOSIS — R73.01 IMPAIRED FASTING GLUCOSE: ICD-10-CM

## 2022-07-05 DIAGNOSIS — M17.12 PRIMARY OSTEOARTHRITIS OF LEFT KNEE: ICD-10-CM

## 2022-07-05 DIAGNOSIS — L71.9 ROSACEA: ICD-10-CM

## 2022-07-05 DIAGNOSIS — M17.11 PRIMARY OSTEOARTHRITIS OF RIGHT KNEE: ICD-10-CM

## 2022-07-05 DIAGNOSIS — M51.36 DDD (DEGENERATIVE DISC DISEASE), LUMBAR: ICD-10-CM

## 2022-07-05 DIAGNOSIS — Z13.220 SCREENING, LIPID: ICD-10-CM

## 2022-07-05 DIAGNOSIS — N18.32 STAGE 3B CHRONIC KIDNEY DISEASE (HCC): ICD-10-CM

## 2022-07-05 DIAGNOSIS — Z71.82 EXERCISE COUNSELING: ICD-10-CM

## 2022-07-05 DIAGNOSIS — Z76.89 ESTABLISHING CARE WITH NEW DOCTOR, ENCOUNTER FOR: ICD-10-CM

## 2022-07-05 DIAGNOSIS — M17.0 PRIMARY OSTEOARTHRITIS OF BOTH KNEES: ICD-10-CM

## 2022-07-05 DIAGNOSIS — N18.32 STAGE 3B CHRONIC KIDNEY DISEASE (HCC): Primary | ICD-10-CM

## 2022-07-05 DIAGNOSIS — I10 HYPERTENSION, UNSPECIFIED TYPE: ICD-10-CM

## 2022-07-05 DIAGNOSIS — Z12.11 SCREEN FOR COLON CANCER: ICD-10-CM

## 2022-07-05 DIAGNOSIS — E03.9 HYPOTHYROIDISM, UNSPECIFIED TYPE: ICD-10-CM

## 2022-07-05 DIAGNOSIS — J45.30 MILD PERSISTENT ASTHMA WITHOUT COMPLICATION: ICD-10-CM

## 2022-07-05 DIAGNOSIS — M47.817 LUMBOSACRAL SPONDYLOSIS WITHOUT MYELOPATHY: ICD-10-CM

## 2022-07-05 PROBLEM — H52.4 PRESBYOPIA: Status: ACTIVE | Noted: 2022-07-05

## 2022-07-05 LAB
ALBUMIN SERPL-MCNC: 4.1 G/DL (ref 3.5–5.2)
ANION GAP SERPL CALCULATED.3IONS-SCNC: 14 MMOL/L (ref 7–16)
ANTISTREPTOLYSIN-O: 33 IU/ML (ref 0–200)
BACTERIA: ABNORMAL /HPF
BILIRUBIN URINE: ABNORMAL
BLOOD, URINE: NEGATIVE
BUN BLDV-MCNC: 22 MG/DL (ref 6–20)
C-REACTIVE PROTEIN: 1.3 MG/DL (ref 0–0.4)
CALCIUM SERPL-MCNC: 9 MG/DL (ref 8.6–10.2)
CHLORIDE BLD-SCNC: 102 MMOL/L (ref 98–107)
CLARITY: CLEAR
CO2: 24 MMOL/L (ref 22–29)
COLOR: YELLOW
CREAT SERPL-MCNC: 1 MG/DL (ref 0.5–1)
CREATININE URINE: 266 MG/DL (ref 29–226)
EPITHELIAL CELLS, UA: ABNORMAL /HPF
GFR AFRICAN AMERICAN: >60
GFR NON-AFRICAN AMERICAN: 57 ML/MIN/1.73
GLUCOSE BLD-MCNC: 120 MG/DL (ref 74–99)
GLUCOSE URINE: NEGATIVE MG/DL
KETONES, URINE: ABNORMAL MG/DL
LEUKOCYTE ESTERASE, URINE: NEGATIVE
NITRITE, URINE: POSITIVE
PH UA: 6 (ref 5–9)
PHOSPHORUS: 3 MG/DL (ref 2.5–4.5)
POTASSIUM SERPL-SCNC: 4.3 MMOL/L (ref 3.5–5)
PROTEIN PROTEIN: 26 MG/DL (ref 0–12)
PROTEIN UA: NEGATIVE MG/DL
RBC UA: ABNORMAL /HPF (ref 0–2)
SEDIMENTATION RATE, ERYTHROCYTE: 16 MM/HR (ref 0–20)
SODIUM BLD-SCNC: 140 MMOL/L (ref 132–146)
SODIUM URINE: 65 MMOL/L
SPECIFIC GRAVITY UA: 1.02 (ref 1–1.03)
UROBILINOGEN, URINE: 0.2 E.U./DL
WBC UA: ABNORMAL /HPF (ref 0–5)

## 2022-07-05 PROCEDURE — 81003 URINALYSIS AUTO W/O SCOPE: CPT | Performed by: SURGERY

## 2022-07-05 PROCEDURE — 99205 OFFICE O/P NEW HI 60 MIN: CPT | Performed by: SURGERY

## 2022-07-05 RX ORDER — LEVOTHYROXINE SODIUM 0.03 MG/1
25 TABLET ORAL DAILY
Qty: 30 TABLET | Refills: 5 | Status: SHIPPED | OUTPATIENT
Start: 2022-07-05

## 2022-07-05 RX ORDER — METOPROLOL TARTRATE 100 MG/1
100 TABLET ORAL 2 TIMES DAILY
Qty: 60 TABLET | Refills: 5 | Status: SHIPPED | OUTPATIENT
Start: 2022-07-05

## 2022-07-05 RX ORDER — TRAMADOL HYDROCHLORIDE 50 MG/1
50 TABLET ORAL EVERY 6 HOURS PRN
Qty: 20 TABLET | Refills: 0 | Status: SHIPPED | OUTPATIENT
Start: 2022-07-05 | End: 2022-07-19

## 2022-07-05 SDOH — ECONOMIC STABILITY: FOOD INSECURITY: WITHIN THE PAST 12 MONTHS, THE FOOD YOU BOUGHT JUST DIDN'T LAST AND YOU DIDN'T HAVE MONEY TO GET MORE.: NEVER TRUE

## 2022-07-05 SDOH — ECONOMIC STABILITY: FOOD INSECURITY: WITHIN THE PAST 12 MONTHS, YOU WORRIED THAT YOUR FOOD WOULD RUN OUT BEFORE YOU GOT MONEY TO BUY MORE.: NEVER TRUE

## 2022-07-05 ASSESSMENT — PATIENT HEALTH QUESTIONNAIRE - PHQ9
8. MOVING OR SPEAKING SO SLOWLY THAT OTHER PEOPLE COULD HAVE NOTICED. OR THE OPPOSITE, BEING SO FIGETY OR RESTLESS THAT YOU HAVE BEEN MOVING AROUND A LOT MORE THAN USUAL: 1
6. FEELING BAD ABOUT YOURSELF - OR THAT YOU ARE A FAILURE OR HAVE LET YOURSELF OR YOUR FAMILY DOWN: 0
SUM OF ALL RESPONSES TO PHQ QUESTIONS 1-9: 6
9. THOUGHTS THAT YOU WOULD BE BETTER OFF DEAD, OR OF HURTING YOURSELF: 0
1. LITTLE INTEREST OR PLEASURE IN DOING THINGS: 0
7. TROUBLE CONCENTRATING ON THINGS, SUCH AS READING THE NEWSPAPER OR WATCHING TELEVISION: 0
SUM OF ALL RESPONSES TO PHQ QUESTIONS 1-9: 6
5. POOR APPETITE OR OVEREATING: 3
SUM OF ALL RESPONSES TO PHQ QUESTIONS 1-9: 6
10. IF YOU CHECKED OFF ANY PROBLEMS, HOW DIFFICULT HAVE THESE PROBLEMS MADE IT FOR YOU TO DO YOUR WORK, TAKE CARE OF THINGS AT HOME, OR GET ALONG WITH OTHER PEOPLE: 0
SUM OF ALL RESPONSES TO PHQ QUESTIONS 1-9: 6
SUM OF ALL RESPONSES TO PHQ9 QUESTIONS 1 & 2: 0
3. TROUBLE FALLING OR STAYING ASLEEP: 1
2. FEELING DOWN, DEPRESSED OR HOPELESS: 0
4. FEELING TIRED OR HAVING LITTLE ENERGY: 1

## 2022-07-05 ASSESSMENT — SOCIAL DETERMINANTS OF HEALTH (SDOH): HOW HARD IS IT FOR YOU TO PAY FOR THE VERY BASICS LIKE FOOD, HOUSING, MEDICAL CARE, AND HEATING?: NOT VERY HARD

## 2022-07-05 NOTE — PATIENT INSTRUCTIONS
Eat between 1200 and 1400 Calories per day to loose weight  Carbs limit to 45 to 60g per meal  Fats limit to 60g per day (no more than 20g of saturated fats)  Cholesterol limit to no more than 300mg per day  Sodium less than 2000mg per day    MyCamPlexPal or similar application (or track by journal) to monitor calories and macronutrients. This is the most critical component to a heart healthy, balanced diet: honesty, keeping oneself accountable, ensure you are tracking daily goals and meeting them. Food is medicine!      [email or web address] [employer]              Counseled the patient on importance of cardiovascular and resistance training. Make every attempt to engage in a level of activity, sustained for at least 30 minutes, where you saturate your undergarments with sweat. This should be done, at a minimum, three times per week.
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
## 2022-07-06 LAB — ANTI-NUCLEAR ANTIBODY (ANA): NEGATIVE

## 2022-07-07 ENCOUNTER — TELEPHONE (OUTPATIENT)
Dept: FAMILY MEDICINE CLINIC | Age: 57
End: 2022-07-07

## 2022-07-07 NOTE — TELEPHONE ENCOUNTER
Called patient to find out if she was having any UTI symptoms. Patient did not answer, left voicemail to call back. Per Lebron Rajput we find out if she has ANY urinary/UTI symptoms at all? Frequency, urgency, incontinence, intermittent stream, straining, dysuria/burning, flank pain, groin pain, suprapubic pressure or pain. Otherwise, she has something called asymptomatic bacturia and it is an entity we do not treat. She does have elevations in her inflammatory markers and I am now considering that she may have a rheumatologic cause for her pain or her kidneys (though the elevated CRP could be an acute phase reactant that indicates infection).  If she is having symptoms I want another urine to send for culture and to put her on antibiotic.  \"

## 2022-07-08 ENCOUNTER — OFFICE VISIT (OUTPATIENT)
Dept: PAIN MANAGEMENT | Age: 57
End: 2022-07-08
Payer: MEDICAID

## 2022-07-08 VITALS
OXYGEN SATURATION: 95 % | DIASTOLIC BLOOD PRESSURE: 86 MMHG | BODY MASS INDEX: 47.2 KG/M2 | TEMPERATURE: 97.8 F | RESPIRATION RATE: 16 BRPM | HEIGHT: 61 IN | SYSTOLIC BLOOD PRESSURE: 124 MMHG | WEIGHT: 250 LBS | HEART RATE: 117 BPM

## 2022-07-08 DIAGNOSIS — N30.01 ACUTE CYSTITIS WITH HEMATURIA: Primary | ICD-10-CM

## 2022-07-08 DIAGNOSIS — S43.431A TEAR OF RIGHT GLENOID LABRUM, INITIAL ENCOUNTER: ICD-10-CM

## 2022-07-08 DIAGNOSIS — E66.9 OBESITY, UNSPECIFIED CLASSIFICATION, UNSPECIFIED OBESITY TYPE, UNSPECIFIED WHETHER SERIOUS COMORBIDITY PRESENT: ICD-10-CM

## 2022-07-08 DIAGNOSIS — M19.011 GLENOHUMERAL ARTHRITIS, RIGHT: Primary | ICD-10-CM

## 2022-07-08 DIAGNOSIS — M19.90 OSTEOARTHRITIS, UNSPECIFIED OSTEOARTHRITIS TYPE, UNSPECIFIED SITE: ICD-10-CM

## 2022-07-08 DIAGNOSIS — N30.00 ACUTE CYSTITIS WITHOUT HEMATURIA: ICD-10-CM

## 2022-07-08 DIAGNOSIS — M25.559 HIP PAIN: ICD-10-CM

## 2022-07-08 DIAGNOSIS — M47.817 LUMBOSACRAL SPONDYLOSIS WITHOUT MYELOPATHY: Primary | ICD-10-CM

## 2022-07-08 DIAGNOSIS — M51.36 DDD (DEGENERATIVE DISC DISEASE), LUMBAR: ICD-10-CM

## 2022-07-08 PROCEDURE — 99213 OFFICE O/P EST LOW 20 MIN: CPT | Performed by: ANESTHESIOLOGY

## 2022-07-08 RX ORDER — SULFAMETHOXAZOLE AND TRIMETHOPRIM 800; 160 MG/1; MG/1
1 TABLET ORAL EVERY 12 HOURS SCHEDULED
Status: DISCONTINUED | OUTPATIENT
Start: 2022-07-08 | End: 2022-07-11

## 2022-07-08 NOTE — PROGRESS NOTES
Do you currently have any of the following:    Fever: No  Headache:  No  Cough: No  Shortness of breath: No  Exposed to anyone with these symptoms: Shikha                                                                                                                Mabel Arellano presents to the [address] [address] on 7/8/2022. Hayley Enriquez is complaining of pain in her neck and back. The pain is constant. The pain is described as aching, throbbing, shooting, stabbing, sharp and burning. Pain is rated on her best day at a 8, on her worst day at a 9, and on average at a 8 on the VAS scale. She took her last dose of Tylenol lastnight. Hayley Enriquez does not have issues with constipation. Any procedures since your last visit: No.    She is not on NSAIDS and  is not on anticoagulation medications. Pacemaker or defibrillator: No Physician managing device is n/a.       /86 (Site: Left Upper Arm)   Pulse (!) 117   Temp 97.8 °F (36.6 °C)   Resp 16   Ht 5' 1\" (1.549 m)   Wt 250 lb (113.4 kg)   SpO2 95%   BMI 47.24 kg/m²      No LMP recorded.  Patient is postmenopausal.

## 2022-07-08 NOTE — PROGRESS NOTES
[address]  [address] [facility], [address] [facility]  [phone]    Follow up Note      Andrade Rodriguez     Date of Visit:  7/8/2022    CC:  Patient presents for follow up   Chief Complaint   Patient presents with    Follow-up    Neck Pain    Back Pain       HPI:    Low back pain. Predominantly axial in nature. Also has Multiple joint pain. Doing PT/HEP/ TENS unit/ and trying to loose weight. Has been getting pain meds by Dr. Lon Valadez- in the past.    S/p lumbar facet MB RFA   Bilateral L3, 4,5 DR with good pain relief. S/P SIJ injection on 11/9/2021 - helped SIJ pain. Low back pain is stable. S/P right shoulder arthroscopic rotator cuff surgery on 1/8/2021 by Dr. Zac Anguiano-  follows with ortho. Has h/o left hip pain - S/P left hip injection on 7/12/2021. Neck pain - being evaluated by Dr. Erica Mcbride. Had MRI of C spine.     Nursing notes and details of the pain history reviewed. Please see intake notes for details.     Previous treatments:   Physical Therapy : yes - continues Hep regularly.     Medications: - NSAID's : yes                       - Membrane stabilizers : yes                        - Opioids : yes,                        - Adjuvants or Others : yes       She has not been on anticoagulation medications.     She has not been on herbal supplements.       She is not diabetic.     H/O Smoking: denies  H/O alcohol abuse : denies  H/O Illicit drug use : denies     Employment: disability    EMG  Dr. Erica Mcbride:  EMG on 7/14/2021: EMG was done today and showed a normal study. The patient was educated about the diagnosis and the prognosis.     Imaging:     MRI of LS spine: 11/3/2021:    RESULT:     There is a transitional vertebral segment in this patient.  For the   purposes of this dictation, the transitional segment is designated as a   partially lumbarized S1 vertebral body with a rudimentary S1/S2 disc   space.      Coronal localizer images reveal a mild dextroscoliosis centered at the L3   level. Sagittal images reveal normal height of the lumbar vertebral bodies.     There is a mild grade 1 anterolisthesis of L5 upon S1 on a degenerative   basis.  There are no pars interarticularis defects.  There is no bone   marrow edema.  The intervertebral disc spaces are maintained in height.     There is mild degenerative disc disease at L4/L5 and L5/S1 with loss of   the normally seen bright T2-weighted signal intensity of these discs.     The conus medullaris terminates at the L1/L2 level and appears   unremarkable.  There are no prevertebral masses. Axial images at L1/L2 through L3/L4, inclusive, reveal no compression of   the thecal sac nor the neural foramina. At L4/L5, there is a mildly bulging disc.  There is no spinal stenosis.     There are mild facet hypertrophic changes.  The neural foramina are   patent. At L5/S1, there is mild central stenosis due to a bulging disc, bilateral   facet hypertrophy, and infolding of the ligamenta flava.  There is fluid   at the facet joints.  There is mild to moderate neural foraminal   narrowing due to a bulging disc which mildly abuts the exiting L5 nerve   roots. IMPRESSION:   1.  Transitional vertebral segment as noted above.  Prior to any   intervention, correlation with plain radiographs is recommended for   purposes of numbering of the lumbar vertebral bodies. 2.  Mild dextroscoliosis and a mild degenerative grade 1 anterolisthesis   of L5 upon S1.     3.  Mild multifactorial central stenosis at L5/S1. 4.  Mild spondylosis at the other levels as noted above. MRI CERVICAL SPINE [facility] 11/3/2021    IMPRESSION:     1.  Dextroscoliosis and nonspecific straightening of the normal cervical   lordosis. 2.  At C5/C6, there is mild central stenosis due to a bulging disc and   central disc protrusion which contact, but do not compress, the spinal   cord.      3.  Mild spondylosis at the other levels as noted above. 4.  No abnormal spinal cord signal intensity. X-ray LS spien: 3/9/2020: Impression         1. Posterior facet joint degenerative changes, mild at L4-L5 level and    moderate at L5-S1 level. 2. Mild to moderate neural foraminal narrowing at L5-S1 level. 3. Mild right sacroiliitis.         MRI of the left hip on 8/21/2020: Impression    1. Moderate left and mild right hip osteoarthrosis.  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  Small debris containing left hip joint effusion. 2. No acute osseous abnormality.  No femoral head AVN. 3. Degenerative tearing of the left acetabular labrum. 4. Mild degenerative changes in the lumbar spine. 5. Fibroid uterus.       XR humerus: 9/15/2020:      Impression    10 screw fixation of right humerus.  No hardware complications.       Xray left knee: 1/2/2020: Impression    1.  Moderate degenerative changes as above.     2.  No acute findings.       Xray left hip: 1/2/2020:      Impression    Mild left hip joint arthritis.         Xray right knee: 2016:  Impression    Right knee joint effusion could be related to internal    derangement      Urine Drug Screening: no (not getting meds form us)    OARRS report[de-identified]  Reviewed today : Consistent    Past Medical History:   Diagnosis Date    Anxiety     Arthritis     Asthma     Chronic headaches     Chronic pain     Depression     GERD (gastroesophageal reflux disease)     Hip pain     History of cardiovascular stress test 05/2015    dobutamine stress test    Hypertension     Incontinence in female     Irritable bowel syndrome     Neuromuscular disorder ([facility] [address]     Neuropathy     Obesity     Prolonged emergence from general anesthesia     sometimes slow to wake up slow to go to sleep    Thyroid disease        Past Surgical History:   Procedure Laterality Date    BACK INJECTION Bilateral 11/09/2021    BILATERAL SACROILLAC JOINT INJECTION WITH XRAY performed by Tamara Tran SHOULDER ARTHROSCOPY, SYNOVECTOMY AND DEBRIDEMENT performed by Blanca Lackey DO at [address] [place] [address]          Prior to Admission medications    Medication Sig Start Date End Date Taking? Authorizing Provider   metoprolol (LOPRESSOR) 100 MG tablet Take 1 tablet by mouth 2 times daily 7/5/22  Yes Mehdi Sanon DO   levothyroxine (SYNTHROID) 25 MCG tablet Take 1 tablet by mouth Daily 7/5/22  Yes Mehdi Sanon DO   losartan (COZAAR) 100 MG tablet TAKE ONE TABLET BY MOUTH DAILY 6/2/22  Yes Historical Provider, MD   benztropine (COGENTIN) 0.5 MG tablet Take 0.5 mg by mouth 2 times daily as needed   Yes Historical Provider, MD   pregabalin (LYRICA) 75 MG capsule Take 1 capsule by mouth 2 times daily for 30 days. 11/22/21 7/8/22 Yes Hallie Christensen DO   baclofen (LIORESAL) 10 MG tablet Take 1 tablet by mouth 2 times daily 11/22/21  Yes Hallie Christensen DO   famotidine (PEPCID) 40 MG tablet Take 40 mg by mouth daily   Yes Historical Provider, MD [employer] (WRIST BRACE) MISC Right wrist brace for carpal tunnel. Wear at bedtime. 12/16/20  Yes Blanca Lackey DO   busPIRone (BUSPAR) 15 MG tablet Take 15 mg by mouth 2 times daily    Yes Historical Provider, MD   VORTIoxetine (TRINTELLIX) 10 MG TABS tablet Take 10 mg by mouth daily   Yes Historical Provider, MD   VRAYLAR 1.5 MG capsule Take 3 mg by mouth daily  11/10/20  Yes Historical Provider, MD   budesonide-formoterol (SYMBICORT) 160-4.5 MCG/ACT AERO Inhale 2 puffs into the lungs 2 times daily   Yes Historical Provider, MD   ciclopirox (LOPROX) 0.77 % cream Apply topically 2 times daily Apply topically 2 times daily. Yes Historical Provider, MD   hydrocortisone (HYTONE) 2.5 % lotion Apply topically 2 times daily Apply topically 2 times daily.    Yes Historical Provider, MD   hydrOXYzine (VISTARIL) 25 MG capsule Take 25 mg by mouth 3 times daily as needed for Itching   Yes Historical Provider, MD loratadine (CLARITIN) 10 MG tablet Take 10 mg by mouth daily    Yes Historical Provider, MD   albuterol sulfate  (90 BASE) MCG/ACT inhaler Inhale 2 puffs into the lungs every 6 hours as needed for Wheezing   Yes Historical Provider, MD   montelukast (SINGULAIR) 10 MG tablet Take 10 mg by mouth nightly. Yes Historical Provider, MD   pantoprazole (PROTONIX) 40 MG tablet Take 40 mg by mouth nightly    Yes Historical Provider, MD   traMADol (ULTRAM) 50 MG tablet Take 1 tablet by mouth every 6 hours as needed for Pain for up to 14 days. Intended supply: 5 days. Take lowest dose possible to manage pain  Patient not taking: Reported on 7/8/2022 7/5/22 7/19/22  Erica Rodriguez,    naloxone 4 MG/0.1ML LIQD nasal spray 1 spray by Nasal route as needed for Opioid Reversal  Patient not taking: Reported on 7/8/2022 5/31/22   Janessa Jenkins,    polyethylene glycol [facility]) 17 GM/SCOOP powder Take by mouth  Patient not taking: Reported on 7/8/2022    Historical Provider, MD   melatonin 3 MG TABS tablet Take 10 mg by mouth nightly as needed   Patient not taking: Reported on 7/8/2022    Historical Provider, MD   HYDROcodone-acetaminophen (NORCO) 7.5-325 MG per tablet Take 1.5 tablets by mouth 2 times daily.    Patient not taking: Reported on 7/8/2022    Historical Provider, MD       Allergies   Allergen Reactions    Cat Hair Extract     Cymbalta [Duloxetine Hcl] Other (See Comments)     anger    Dexlansoprazole Nausea Only    Diovan [Valsartan] Other (See Comments)     Cough, sore throat    Ditropan [Oxybutynin] Other (See Comments)     hoarsness    Lunesta [Eszopiclone] Other (See Comments)     Bad dreams    Molds & Smuts     Norvasc [Amlodipine Besylate] Other (See Comments)     cough    Seasonal     Sular [Nisoldipine Er] Other (See Comments)     Cough      Tape Percell Eve Tape]      rash    Dextromethorphan Polistirex Er Rash    Levaquin [Levofloxacin] Rash    Other Rash     Strawberries, wheat, peppers    Questran [Cholestyramine] Rash    Yellow Dyes (Non-Tartrazine) Rash     Yellow dye #6, (#11 & #5 if combined)       Social History     Socioeconomic History    Marital status: Single     Spouse name: Not on file    Number of children: Not on file    Years of education: Not on file    Highest education level: Not on file   Occupational History    Not on file   Tobacco Use    Smoking status: Never Smoker    Smokeless tobacco: Never Used   Vaping Use    Vaping Use: Never used   Substance and Sexual Activity    Alcohol use: Yes     Comment: occas    Drug use: No    Sexual activity: Not Currently   Other Topics Concern    Not on file   Social History Narrative    Not on file     Social Determinants of Health     Financial Resource Strain: Low Risk     Difficulty of Paying Living Expenses: Not very hard   Food Insecurity: No Food Insecurity    Worried About Running Out of Food in the Last Year: Never true    Marc of Food in the Last Year: Never true   Transportation Needs:     Lack of Transportation (Medical): Not on file    Lack of Transportation (Non-Medical):  Not on file   Physical Activity:     Days of Exercise per Week: Not on file    Minutes of Exercise per Session: Not on file   Stress:     Feeling of Stress : Not on file   Social Connections:     Frequency of Communication with Friends and Family: Not on file    Frequency of Social Gatherings with Friends and Family: Not on file    Attends Jewish Services: Not on file    Active Member of Clubs or Organizations: Not on file    Attends Club or Organization Meetings: Not on file    Marital Status: Not on file   Intimate Partner Violence:     Fear of Current or Ex-Partner: Not on file    Emotionally Abused: Not on file    Physically Abused: Not on file    Sexually Abused: Not on file   Housing Stability:     Unable to Pay for Housing in the Last Year: Not on file    Number of [place] in the Last Year: Not on plan discussed with the patient including medication and procedure side effects.     Controlled Substances Monitoring:    OARRS reviewed- 7/8/22     Trenton Farrell MD    CC:  Angela Munoz DO

## 2022-07-11 DIAGNOSIS — N30.00 ACUTE CYSTITIS WITHOUT HEMATURIA: Primary | ICD-10-CM

## 2022-07-11 RX ORDER — SULFAMETHOXAZOLE AND TRIMETHOPRIM 800; 160 MG/1; MG/1
1 TABLET ORAL 2 TIMES DAILY
Qty: 10 TABLET | Refills: 0 | Status: SHIPPED | OUTPATIENT
Start: 2022-07-11 | End: 2022-07-16

## 2022-07-13 ENCOUNTER — HOSPITAL ENCOUNTER (OUTPATIENT)
Age: 57
Discharge: HOME | End: 2022-07-13
Payer: MEDICAID

## 2022-07-13 DIAGNOSIS — N30.00 ACUTE CYSTITIS WITHOUT HEMATURIA: ICD-10-CM

## 2022-07-13 PROCEDURE — 87088 URINE BACTERIA CULTURE: CPT

## 2022-07-15 LAB — URINE CULTURE, ROUTINE: NORMAL

## 2022-07-19 ENCOUNTER — TELEPHONE (OUTPATIENT)
Dept: FAMILY MEDICINE CLINIC | Age: 57
End: 2022-07-19

## 2022-07-19 NOTE — TELEPHONE ENCOUNTER
Patient called she saw her urine results. Asking if the antibiotic she is on the correct one. Patient stated she is not having any UTI symptoms. Please advise.     Last seen 7/5/2022  Next appt 10/5/2022

## 2022-08-04 ENCOUNTER — APPOINTMENT (OUTPATIENT)
Dept: GENERAL RADIOLOGY | Age: 57
End: 2022-08-04
Payer: MEDICAID

## 2022-08-04 ENCOUNTER — APPOINTMENT (OUTPATIENT)
Dept: CT IMAGING | Age: 57
End: 2022-08-04
Payer: MEDICAID

## 2022-08-04 ENCOUNTER — HOSPITAL ENCOUNTER (EMERGENCY)
Age: 57
Discharge: HOME | End: 2022-08-04
Attending: EMERGENCY MEDICINE
Payer: MEDICAID

## 2022-08-04 VITALS
HEART RATE: 75 BPM | OXYGEN SATURATION: 94 % | TEMPERATURE: 98.7 F | DIASTOLIC BLOOD PRESSURE: 103 MMHG | RESPIRATION RATE: 19 BRPM | SYSTOLIC BLOOD PRESSURE: 149 MMHG

## 2022-08-04 DIAGNOSIS — I10 PRIMARY HYPERTENSION: Primary | ICD-10-CM

## 2022-08-04 DIAGNOSIS — R11.2 NON-INTRACTABLE VOMITING WITH NAUSEA, UNSPECIFIED VOMITING TYPE: ICD-10-CM

## 2022-08-04 LAB
ANION GAP SERPL CALCULATED.3IONS-SCNC: 11 MMOL/L (ref 7–16)
BACTERIA: ABNORMAL /HPF
BASOPHILS ABSOLUTE: 0.15 E9/L (ref 0–0.2)
BASOPHILS RELATIVE PERCENT: 1.6 % (ref 0–2)
BETA-HYDROXYBUTYRATE: 0.28 MMOL/L (ref 0.02–0.27)
BILIRUBIN URINE: NEGATIVE
BLOOD, URINE: NEGATIVE
BUN BLDV-MCNC: 15 MG/DL (ref 6–20)
CALCIUM SERPL-MCNC: 9.3 MG/DL (ref 8.6–10.2)
CHLORIDE BLD-SCNC: 100 MMOL/L (ref 98–107)
CHP ED QC CHECK: NORMAL
CLARITY: CLEAR
CO2: 26 MMOL/L (ref 22–29)
COLOR: YELLOW
CREAT SERPL-MCNC: 0.8 MG/DL (ref 0.5–1)
EKG ATRIAL RATE: 74 BPM
EKG P AXIS: 23 DEGREES
EKG P-R INTERVAL: 150 MS
EKG Q-T INTERVAL: 398 MS
EKG QRS DURATION: 76 MS
EKG QTC CALCULATION (BAZETT): 441 MS
EKG R AXIS: 0 DEGREES
EKG T AXIS: 45 DEGREES
EKG VENTRICULAR RATE: 74 BPM
EOSINOPHILS ABSOLUTE: 0.23 E9/L (ref 0.05–0.5)
EOSINOPHILS RELATIVE PERCENT: 2.4 % (ref 0–6)
EPITHELIAL CELLS, UA: ABNORMAL /HPF
GFR AFRICAN AMERICAN: >60
GFR NON-AFRICAN AMERICAN: >60 ML/MIN/1.73
GLUCOSE BLD-MCNC: 126 MG/DL (ref 74–99)
GLUCOSE BLD-MCNC: 128 MG/DL
GLUCOSE URINE: NEGATIVE MG/DL
HCT VFR BLD CALC: 42.5 % (ref 34–48)
HEMOGLOBIN: 13.5 G/DL (ref 11.5–15.5)
IMMATURE GRANULOCYTES #: 0.17 E9/L
IMMATURE GRANULOCYTES %: 1.8 % (ref 0–5)
KETONES, URINE: NEGATIVE MG/DL
LEUKOCYTE ESTERASE, URINE: NEGATIVE
LIPASE: 29 U/L (ref 13–60)
LYMPHOCYTES ABSOLUTE: 2.44 E9/L (ref 1.5–4)
LYMPHOCYTES RELATIVE PERCENT: 25.9 % (ref 20–42)
MCH RBC QN AUTO: 29.3 PG (ref 26–35)
MCHC RBC AUTO-ENTMCNC: 31.8 % (ref 32–34.5)
MCV RBC AUTO: 92.2 FL (ref 80–99.9)
METER GLUCOSE: 128 MG/DL (ref 74–99)
MONOCYTES ABSOLUTE: 0.79 E9/L (ref 0.1–0.95)
MONOCYTES RELATIVE PERCENT: 8.4 % (ref 2–12)
NEUTROPHILS ABSOLUTE: 5.64 E9/L (ref 1.8–7.3)
NEUTROPHILS RELATIVE PERCENT: 59.9 % (ref 43–80)
NITRITE, URINE: NEGATIVE
PDW BLD-RTO: 15.3 FL (ref 11.5–15)
PH UA: 7 (ref 5–9)
PH VENOUS: 7.4 (ref 7.35–7.45)
PLATELET # BLD: 272 E9/L (ref 130–450)
PMV BLD AUTO: 12.3 FL (ref 7–12)
POTASSIUM SERPL-SCNC: 4.1 MMOL/L (ref 3.5–5)
PROTEIN UA: NEGATIVE MG/DL
RBC # BLD: 4.61 E12/L (ref 3.5–5.5)
RBC UA: ABNORMAL /HPF (ref 0–2)
SARS-COV-2, NAAT: NOT DETECTED
SODIUM BLD-SCNC: 137 MMOL/L (ref 132–146)
SPECIFIC GRAVITY UA: 1.01 (ref 1–1.03)
TROPONIN, HIGH SENSITIVITY: 10 NG/L (ref 0–9)
UROBILINOGEN, URINE: 0.2 E.U./DL
WBC # BLD: 9.4 E9/L (ref 4.5–11.5)
WBC UA: ABNORMAL /HPF (ref 0–5)

## 2022-08-04 PROCEDURE — 87088 URINE BACTERIA CULTURE: CPT

## 2022-08-04 PROCEDURE — 99285 EMERGENCY DEPT VISIT HI MDM: CPT

## 2022-08-04 PROCEDURE — 6360000004 HC RX CONTRAST MEDICATION: Performed by: RADIOLOGY

## 2022-08-04 PROCEDURE — 82010 KETONE BODYS QUAN: CPT

## 2022-08-04 PROCEDURE — 71046 X-RAY EXAM CHEST 2 VIEWS: CPT

## 2022-08-04 PROCEDURE — 6370000000 HC RX 637 (ALT 250 FOR IP): Performed by: EMERGENCY MEDICINE

## 2022-08-04 PROCEDURE — 82962 GLUCOSE BLOOD TEST: CPT

## 2022-08-04 PROCEDURE — 84484 ASSAY OF TROPONIN QUANT: CPT

## 2022-08-04 PROCEDURE — 74177 CT ABD & PELVIS W/CONTRAST: CPT

## 2022-08-04 PROCEDURE — 80048 BASIC METABOLIC PNL TOTAL CA: CPT

## 2022-08-04 PROCEDURE — 82800 BLOOD PH: CPT

## 2022-08-04 PROCEDURE — 83690 ASSAY OF LIPASE: CPT

## 2022-08-04 PROCEDURE — 87186 SC STD MICRODIL/AGAR DIL: CPT

## 2022-08-04 PROCEDURE — 85025 COMPLETE CBC W/AUTO DIFF WBC: CPT

## 2022-08-04 PROCEDURE — 96374 THER/PROPH/DIAG INJ IV PUSH: CPT

## 2022-08-04 PROCEDURE — 93005 ELECTROCARDIOGRAM TRACING: CPT | Performed by: EMERGENCY MEDICINE

## 2022-08-04 PROCEDURE — 36415 COLL VENOUS BLD VENIPUNCTURE: CPT

## 2022-08-04 PROCEDURE — 96372 THER/PROPH/DIAG INJ SC/IM: CPT

## 2022-08-04 PROCEDURE — 81001 URINALYSIS AUTO W/SCOPE: CPT

## 2022-08-04 PROCEDURE — 6360000002 HC RX W HCPCS: Performed by: EMERGENCY MEDICINE

## 2022-08-04 PROCEDURE — 87635 SARS-COV-2 COVID-19 AMP PRB: CPT

## 2022-08-04 PROCEDURE — 94664 DEMO&/EVAL PT USE INHALER: CPT

## 2022-08-04 RX ORDER — PROMETHAZINE HYDROCHLORIDE 25 MG/ML
25 INJECTION, SOLUTION INTRAMUSCULAR; INTRAVENOUS ONCE
Status: COMPLETED | OUTPATIENT
Start: 2022-08-04 | End: 2022-08-04

## 2022-08-04 RX ORDER — ONDANSETRON 4 MG/1
4 TABLET, ORALLY DISINTEGRATING ORAL EVERY 8 HOURS PRN
Qty: 10 TABLET | Refills: 0 | Status: SHIPPED | OUTPATIENT
Start: 2022-08-04

## 2022-08-04 RX ORDER — PROMETHAZINE HYDROCHLORIDE 25 MG/1
25 TABLET ORAL EVERY 6 HOURS PRN
Qty: 20 TABLET | Refills: 0 | Status: SHIPPED | OUTPATIENT
Start: 2022-08-04 | End: 2022-08-09

## 2022-08-04 RX ORDER — ONDANSETRON 4 MG/1
4 TABLET, ORALLY DISINTEGRATING ORAL ONCE
Status: COMPLETED | OUTPATIENT
Start: 2022-08-04 | End: 2022-08-04

## 2022-08-04 RX ORDER — IPRATROPIUM BROMIDE AND ALBUTEROL SULFATE 2.5; .5 MG/3ML; MG/3ML
1 SOLUTION RESPIRATORY (INHALATION) ONCE
Status: COMPLETED | OUTPATIENT
Start: 2022-08-04 | End: 2022-08-04

## 2022-08-04 RX ORDER — MORPHINE SULFATE 4 MG/ML
4 INJECTION, SOLUTION INTRAMUSCULAR; INTRAVENOUS ONCE
Status: COMPLETED | OUTPATIENT
Start: 2022-08-04 | End: 2022-08-04

## 2022-08-04 RX ADMIN — MORPHINE SULFATE 4 MG: 4 INJECTION, SOLUTION INTRAMUSCULAR; INTRAVENOUS at 15:09

## 2022-08-04 RX ADMIN — IOPAMIDOL 50 ML: 755 INJECTION, SOLUTION INTRAVENOUS at 16:00

## 2022-08-04 RX ADMIN — PROMETHAZINE HYDROCHLORIDE 25 MG: 25 INJECTION INTRAMUSCULAR; INTRAVENOUS at 21:08

## 2022-08-04 RX ADMIN — ONDANSETRON 4 MG: 4 TABLET, ORALLY DISINTEGRATING ORAL at 13:59

## 2022-08-04 RX ADMIN — IPRATROPIUM BROMIDE AND ALBUTEROL SULFATE 1 AMPULE: .5; 2.5 SOLUTION RESPIRATORY (INHALATION) at 14:50

## 2022-08-04 ASSESSMENT — ENCOUNTER SYMPTOMS
CHEST TIGHTNESS: 0
NAUSEA: 1
SHORTNESS OF BREATH: 0
ABDOMINAL PAIN: 0

## 2022-08-04 ASSESSMENT — PAIN SCALES - GENERAL: PAINLEVEL_OUTOF10: 9
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
## 2022-08-06 LAB
ORGANISM: ABNORMAL
URINE CULTURE, ROUTINE: ABNORMAL

## 2022-08-07 NOTE — PROGRESS NOTES
Reviewed patients after hours culture results. Urine culture growing E coli, patient not discharged on antibiotics, was complaining of urinary frequency. Discussed with Dr. Dakotah Vaughan.    Rx for cefdinir 300 mg 1 BID x 7 days [facility], notified patient.      Antonette Gould, PharmD, BCPS 8/7/2022 2:20 PM   [phone]

## 2022-08-24 ENCOUNTER — HOSPITAL ENCOUNTER (OUTPATIENT)
Age: 57
Discharge: HOME | End: 2022-08-24
Payer: MEDICAID

## 2022-08-24 ENCOUNTER — HOSPITAL ENCOUNTER (OUTPATIENT)
Dept: MAMMOGRAPHY | Age: 57
Discharge: HOME | End: 2022-08-26
Payer: MEDICAID

## 2022-08-24 VITALS — HEIGHT: 60 IN | WEIGHT: 243 LBS | BODY MASS INDEX: 47.71 KG/M2

## 2022-08-24 DIAGNOSIS — Z12.31 VISIT FOR SCREENING MAMMOGRAM: ICD-10-CM

## 2022-08-24 LAB
ALBUMIN SERPL-MCNC: 4.2 G/DL (ref 3.5–5.2)
ALP BLD-CCNC: 88 U/L (ref 35–104)
ALT SERPL-CCNC: 28 U/L (ref 0–32)
ANION GAP SERPL CALCULATED.3IONS-SCNC: 14 MMOL/L (ref 7–16)
AST SERPL-CCNC: 27 U/L (ref 0–31)
BILIRUB SERPL-MCNC: 0.3 MG/DL (ref 0–1.2)
BUN BLDV-MCNC: 17 MG/DL (ref 6–20)
CALCIUM SERPL-MCNC: 9.3 MG/DL (ref 8.6–10.2)
CHLORIDE BLD-SCNC: 105 MMOL/L (ref 98–107)
CO2: 22 MMOL/L (ref 22–29)
CREAT SERPL-MCNC: 1.2 MG/DL (ref 0.5–1)
CREATININE URINE: 196 MG/DL (ref 29–226)
GFR AFRICAN AMERICAN: 56
GFR NON-AFRICAN AMERICAN: 46 ML/MIN/1.73
GLUCOSE BLD-MCNC: 149 MG/DL (ref 74–99)
HCT VFR BLD CALC: 43.9 % (ref 34–48)
HEMOGLOBIN: 14.1 G/DL (ref 11.5–15.5)
MCH RBC QN AUTO: 29.1 PG (ref 26–35)
MCHC RBC AUTO-ENTMCNC: 32.1 % (ref 32–34.5)
MCV RBC AUTO: 90.5 FL (ref 80–99.9)
MICROALBUMIN UR-MCNC: 37.8 MG/L
MICROALBUMIN/CREAT UR-RTO: 19.3 (ref 0–30)
PARATHYROID HORMONE INTACT: 79 PG/ML (ref 15–65)
PDW BLD-RTO: 14.4 FL (ref 11.5–15)
PHOSPHORUS: 2.5 MG/DL (ref 2.5–4.5)
PLATELET # BLD: 282 E9/L (ref 130–450)
PMV BLD AUTO: 12.1 FL (ref 7–12)
POTASSIUM SERPL-SCNC: 3.8 MMOL/L (ref 3.5–5)
PROTEIN PROTEIN: 28 MG/DL (ref 0–12)
PROTEIN/CREAT RATIO: 0.1
PROTEIN/CREAT RATIO: 0.1 (ref 0–0.2)
RBC # BLD: 4.85 E12/L (ref 3.5–5.5)
SODIUM BLD-SCNC: 141 MMOL/L (ref 132–146)
TOTAL PROTEIN: 7.6 G/DL (ref 6.4–8.3)
VITAMIN D 25-HYDROXY: 14 NG/ML (ref 30–100)
WBC # BLD: 10.8 E9/L (ref 4.5–11.5)

## 2022-08-24 PROCEDURE — 36415 COLL VENOUS BLD VENIPUNCTURE: CPT

## 2022-08-24 PROCEDURE — 84100 ASSAY OF PHOSPHORUS: CPT

## 2022-08-24 PROCEDURE — 80053 COMPREHEN METABOLIC PANEL: CPT

## 2022-08-24 PROCEDURE — 82570 ASSAY OF URINE CREATININE: CPT

## 2022-08-24 PROCEDURE — 77067 SCR MAMMO BI INCL CAD: CPT

## 2022-08-24 PROCEDURE — 85027 COMPLETE CBC AUTOMATED: CPT

## 2022-08-24 PROCEDURE — 82306 VITAMIN D 25 HYDROXY: CPT

## 2022-08-24 PROCEDURE — 84156 ASSAY OF PROTEIN URINE: CPT

## 2022-08-24 PROCEDURE — 82044 UR ALBUMIN SEMIQUANTITATIVE: CPT

## 2022-08-24 PROCEDURE — 83970 ASSAY OF PARATHORMONE: CPT

## 2022-09-09 ENCOUNTER — TELEPHONE (OUTPATIENT)
Dept: ORTHOPEDIC SURGERY | Age: 57
End: 2022-09-09

## 2022-09-09 NOTE — TELEPHONE ENCOUNTER
Patient requesting authorization submission for gel injections for hip. Please advise patient [phone].

## 2022-09-12 DIAGNOSIS — M16.12 PRIMARY OSTEOARTHRITIS OF LEFT HIP: Primary | ICD-10-CM

## 2022-09-21 ENCOUNTER — HOSPITAL ENCOUNTER (OUTPATIENT)
Dept: INTERVENTIONAL RADIOLOGY/VASCULAR | Age: 57
Discharge: HOME | End: 2022-09-21
Payer: MEDICAID

## 2022-09-21 DIAGNOSIS — M16.12 PRIMARY OSTEOARTHRITIS OF LEFT HIP: ICD-10-CM

## 2022-09-21 PROCEDURE — 20610 DRAIN/INJ JOINT/BURSA W/O US: CPT

## 2022-09-21 PROCEDURE — 77002 NEEDLE LOCALIZATION BY XRAY: CPT

## 2022-09-21 PROCEDURE — 6360000004 HC RX CONTRAST MEDICATION: Performed by: RADIOLOGY

## 2022-09-21 RX ADMIN — IOPAMIDOL 10 ML: 612 INJECTION, SOLUTION INTRAVENOUS at 11:24

## 2022-10-03 ENCOUNTER — TELEPHONE (OUTPATIENT)
Dept: FAMILY MEDICINE CLINIC | Age: 57
End: 2022-10-03

## 2024-02-02 ENCOUNTER — TELEPHONE (OUTPATIENT)
Dept: ADMINISTRATIVE | Age: 59
End: 2024-02-02

## 2024-02-02 NOTE — TELEPHONE ENCOUNTER
Previous patient seen Dr. Christensen 11/2021  requesting to come back in for dizziness and migraines. Stated she reached out to Dr. Elizabeth office multiple time this week for new referral . No referral in chart currently. Please advise if office received referral.

## 2024-02-02 NOTE — TELEPHONE ENCOUNTER
We have not received any referrals from Dr. Elizabeth for this patient and nothing scanned in media

## 2024-02-15 ENCOUNTER — OFFICE VISIT (OUTPATIENT)
Dept: PHYSICAL MEDICINE AND REHAB | Age: 59
End: 2024-02-15
Payer: MEDICAID

## 2024-02-15 VITALS
WEIGHT: 257 LBS | SYSTOLIC BLOOD PRESSURE: 142 MMHG | DIASTOLIC BLOOD PRESSURE: 80 MMHG | BODY MASS INDEX: 48.52 KG/M2 | HEART RATE: 100 BPM | HEIGHT: 61 IN

## 2024-02-15 DIAGNOSIS — M54.2 CHRONIC NECK PAIN: ICD-10-CM

## 2024-02-15 DIAGNOSIS — G89.29 CHRONIC NECK PAIN: ICD-10-CM

## 2024-02-15 DIAGNOSIS — R51.9 CHRONIC DAILY HEADACHE: ICD-10-CM

## 2024-02-15 DIAGNOSIS — M47.812 SPONDYLOSIS, CERVICAL: ICD-10-CM

## 2024-02-15 DIAGNOSIS — R42 VERTIGO: Primary | ICD-10-CM

## 2024-02-15 PROCEDURE — 3077F SYST BP >= 140 MM HG: CPT | Performed by: PHYSICAL MEDICINE & REHABILITATION

## 2024-02-15 PROCEDURE — 3079F DIAST BP 80-89 MM HG: CPT | Performed by: PHYSICAL MEDICINE & REHABILITATION

## 2024-02-15 PROCEDURE — 99214 OFFICE O/P EST MOD 30 MIN: CPT | Performed by: PHYSICAL MEDICINE & REHABILITATION

## 2024-02-15 RX ORDER — CARIPRAZINE 3 MG/1
CAPSULE, GELATIN COATED ORAL
COMMUNITY
Start: 2024-02-12

## 2024-02-15 RX ORDER — LINAGLIPTIN 5 MG/1
TABLET, FILM COATED ORAL
COMMUNITY
Start: 2024-01-29

## 2024-02-15 RX ORDER — AMLODIPINE BESYLATE 5 MG/1
TABLET ORAL
COMMUNITY
Start: 2024-01-29

## 2024-02-15 RX ORDER — AMOXICILLIN 500 MG/1
TABLET, FILM COATED ORAL
COMMUNITY
Start: 2023-12-18

## 2024-02-15 RX ORDER — RIMEGEPANT SULFATE 75 MG/75MG
75 TABLET, ORALLY DISINTEGRATING ORAL DAILY PRN
Qty: 16 TABLET | Refills: 2 | Status: SHIPPED | OUTPATIENT
Start: 2024-02-15

## 2024-02-15 RX ORDER — PREGABALIN 150 MG/1
150 CAPSULE ORAL 2 TIMES DAILY
COMMUNITY
Start: 2024-01-29

## 2024-02-15 RX ORDER — MECLIZINE HCL 25MG 25 MG/1
TABLET, CHEWABLE ORAL
COMMUNITY
Start: 2024-01-08

## 2024-02-15 RX ORDER — ROSUVASTATIN CALCIUM 5 MG/1
TABLET, COATED ORAL
COMMUNITY
Start: 2023-11-29

## 2024-02-15 RX ORDER — LORAZEPAM 0.5 MG/1
0.5 TABLET ORAL
Qty: 1 TABLET | Refills: 0 | Status: SHIPPED | OUTPATIENT
Start: 2024-02-15 | End: 2024-02-15

## 2024-02-15 RX ORDER — SOLIFENACIN SUCCINATE 10 MG/1
10 TABLET, FILM COATED ORAL DAILY
COMMUNITY
Start: 2023-12-28

## 2024-02-15 NOTE — PATIENT INSTRUCTIONS
General Headache Instructions:  1) Maintain a headache diary; learn to identify and avoid triggers.  2) Limit use of acute treatments (over-the-counter medications, triptans, etc.) to no more than 2 days per week or 10 days per month to prevent medication overuse headache (rebound headache).   3) Follow a regular schedule (including weekends and holidays) for the next 6 weeks:  A) Don't skip meals.  B) 8 hours of sleep nightly.  C) Avoid the following common headache triggers:  -Caffeine (coffee, chocolate, tea, cola/pop/soda (7-up, Sprite, Nani Mist, Ginger Ale, Mug/A+W Root Beer, Minute Maid Orange, Slice are okay))  -Foods containing nitrates (deli meat, ham, quiroga, sausage, hot dogs)  -Tyramine (aged cheese; can only have American cheese, cottage cheese, Velveeta and fresh mozarella (most pizza uses aged mozarella))  -MSG (Chinese/ foods, Doritos, all flavored chips and Ramen noodles)  -Nutrasweet and artificial sweeteners  D) Minimize stress.  E) Exercise 30 minutes per day. Being overweight is associated with a 5 times increased risk of chronic migraine.  F) Keep well hydrated and drink 6-8 glasses of water per day.  4) Initiate non-pharmacologic measures at the earliest onset of your headache.  A) Rest and quiet in a cool, dark environment.  B) Relax and reduce stress.  C) Cold compress to head (place a dry washcloth to forehead, cover with a blue freezer packet and use a headband to press the freezer packet across the forehead and temples).  5) Don't wait!! Take the maximum allowable dosage of prescribed medication at the very earliest sign of headache.  6) Compliance: Take prescribed medication regularly as directed and at the first sign of a headache.  7) Communicate: Call your physician when problems arise, especially if your headaches change, increase in frequency/severity, or become associated with neurological symptoms (weakness, numbness, slurred speech, etc.).  8) Headache/pain management

## 2024-02-15 NOTE — PROGRESS NOTES
superficial or bony tenderness.  + tenderness to palpation at bilateral cervical paraspinals, upper trapezius. Bilateral occipital notch,  sternocleidomastoid,  medial scapular muscles,  scalenes.  No trigger points.  No spasm.   No edema, erythema, ecchymosis, effusion, instability, mass or deformity. No midline bony tenderness. Spurling is negative bilateral.  Cervical AROM in flexion is 60 degrees, in extension is 20 degrees, in left rotation is 60degrees, in right rotation is 60 degrees, in left lateral flexion is 30 degrees and in right lateral flexion is 30 degrees.  There is no crepitus.  bilateral Shoulder: No edema, erythema, ecchymosis, effusion, instability, mass or deformity. Full painless ROM bilateral shoulders. No painful arc.  No crepitus. No tenderness to palpation at the acromioclavicular joint, subacromial space or biceps tendon insertion.  Negative Adryan-Joseph, Scarf,  Drop arm, and Speeds.    Neurologic: Neurologic: Awake, alert and oriented in three planes.    No facial weakness.  Tongue is midline.  Palate elevates symmetrically. Hearing is intact for conversation.  Pupils are equal and round and react to light and acoommodation. Extraocular muscles are intact. Blurring and double vision with testing of extraocular reflex both horizontally and vertically.  Shoulder shrug is symmetric. Speech is fluent. Sensation: Intact for light touch and pin prick in all upper and lower extremity dermatomes.  Intact in the upper and lower extremities for temperature.  Intact at the first Metatarsal-phalangeal joint in bilateral feet and in the first carpal-metacarpal joint in the bilateral hands for vibration.  There is no hyperalgesia or allodynia. Strength: 5/5 in all myotomes in the upper and lower extremities.  Muscle Tendon Reflexes: 3+ symmetric in the bilateral upper and lower extremities. Babinski is upgoing on right, down on left.  De La O is negative bilaterally. Coordination in the upper and

## 2024-02-16 ENCOUNTER — TELEPHONE (OUTPATIENT)
Dept: PHYSICAL MEDICINE AND REHAB | Age: 59
End: 2024-02-16

## 2024-02-16 DIAGNOSIS — M54.2 CHRONIC NECK PAIN: Primary | ICD-10-CM

## 2024-02-16 DIAGNOSIS — G89.29 CHRONIC NECK PAIN: Primary | ICD-10-CM

## 2024-02-16 NOTE — TELEPHONE ENCOUNTER
----- Message from Hallie Christensen DO sent at 2/16/2024 12:54 PM EST -----  Orders placed from home.

## 2024-02-21 LAB
BUN BLDV-MCNC: 15 MG/DL (ref 6–20)
CREAT SERPL-MCNC: 0.6 MG/DL (ref 0.5–1)
GFR SERPL CREATININE-BSD FRML MDRD: >60 ML/MIN/1.73M2

## 2024-02-23 ENCOUNTER — TELEPHONE (OUTPATIENT)
Dept: PHYSICAL MEDICINE AND REHAB | Age: 59
End: 2024-02-23

## 2024-02-23 NOTE — TELEPHONE ENCOUNTER
Called and spoke with the Christie from [facility] pharmacy prior authorization department and nurtec was denied due to medicaid does not cover any cgrp medication with diagnosis code chronic daily headache.  Covered medications are rizatriptian, sumatriptian, noratrpityline and verapamil.  Please advise.

## 2024-02-23 NOTE — TELEPHONE ENCOUNTER
Called and left message on patient voicemail to call office back. Will wait for return call from patient.

## 2024-02-27 ENCOUNTER — TELEPHONE (OUTPATIENT)
Dept: PHYSICAL MEDICINE AND REHAB | Age: 59
End: 2024-02-27

## 2024-02-27 NOTE — TELEPHONE ENCOUNTER
----- Message from Hallie Christensen DO sent at 2/26/2024  5:21 PM EST -----  Reviewed test abnormal, inform patient that it showed degenerative changes.

## 2024-03-01 ENCOUNTER — TELEPHONE (OUTPATIENT)
Dept: PHYSICAL MEDICINE AND REHAB | Age: 59
End: 2024-03-01

## 2024-03-01 NOTE — TELEPHONE ENCOUNTER
Called and  spoke to Eli pharmacist from [facility] regarding denial of Nurtec, relayed message that Dr. Christensen responded and our options are 1. To do a 3rd prior auth with correct diagnoses chronic migraine, 2.list all medications of criteria that needs to be met and what patient has tried and failed and reasons why patient can not take any of the preferred medications 3. Appeal decision and submit all of the above 4. Patient to try step therapy Aimovig, or Ajovy but PA will be needed for that as well   I can call next week and do the prior auth over the phone instead of doing it through Cover [employer] Med[employer] and submit all of the above and we will see

## 2024-03-01 NOTE — TELEPHONE ENCOUNTER
Called and spoke to Gloria from [facility] pharmacy to do peer to peer for denial of Nurtec, states   medicaid does not cover any cgrp medication with diagnosis code chronic daily headache.covered medications are rizatriptian, sumatriptian, noratrpityline and verapamil.   She also states to resubmit a new prior auth for correct diagnoses code of migaine instead of headache and send the office notes to include reason why patient can not take triptans which I told her over the phone the reason, will resubmit a new auth  Do you need to addened note to state diagnoses code for Migraine??

## 2024-03-01 NOTE — TELEPHONE ENCOUNTER
She is on Lyrica Anti-seizure and metoprolol B blocker,Norvasc Calcium channel blocker, losartan an angiotensin II blocker,  vraylar antipsychotic, trintillix an atypical antidepressant. She has severe psychiatric conditions and is on multiple antidepressants as well as antipsychotics and I don't feel comfortable adding any more antidepressants to what she is already on.  She has previously tried Wellbutrin as well.  She has tried rizatriptan but triptans are contraindicated due to medical comorbities and cardiovascular risk.  She has asthma so she really shouldn't have any additional B blocker.   Are they aware of that? Please ask the pharmacist which medication they would feel comfortable giving this patient with her other medical conditions and if they are willing to accept the risk associated with the medications they are recommending for this patient.

## 2024-03-01 NOTE — TELEPHONE ENCOUNTER
[facility], it is not on plan. Covered medications are rizatriptian, sumatriptian, noratrpityline and verapamil.   Please advise

## 2024-03-01 NOTE — TELEPHONE ENCOUNTER
Patient had called in wondering what was going on with the denial of nurtec  I stated I had spoke to [facility] pharmacy with Jesse, did a peer to peer and they did not receive the office notes from Dr. Christensen they recommended to resend office notes along with a new prior auth which I did, explained to pharmacist that reason patient unable to do any triptans is because of HTN, obesity and DM,  I also explained to patient that we submitted prior auth and I can offer her more samples of Nurtec, she states she should be good for the weekend but will call office if more samples of Nurtec needed

## 2024-03-01 NOTE — TELEPHONE ENCOUNTER
Called and spoke with Radha pharmacist with [facility] pharmacy and she stated that the patient has to try one more medication out of either one of these categories for 30 days: anticonvulsive, beta blocker, tricyclic antidepressant or snri's.  Before the will cover the nurtec.  Please advise.

## 2024-03-07 ENCOUNTER — TELEPHONE (OUTPATIENT)
Dept: PHYSICAL MEDICINE AND REHAB | Age: 59
End: 2024-03-07

## 2024-03-07 NOTE — TELEPHONE ENCOUNTER
----- Message from Hallie Christensen DO sent at 3/6/2024  6:05 PM EST -----  Test results are normal please notify patient.

## 2024-03-07 NOTE — PROGRESS NOTES
Hallie Christensen D.O.  [facility]  [address] [place] [address]  Phone: [phone]  Fax: [phone]    Chief Complaint   Patient presents with    Neck Pain     F/U MRI results       An independent historian was not needed.    Interval history:  Patient was ordered Nurtec at last visit and it was denied was given samples.    Data reviewed/prior work up:   Review of external notes:   Referring provider office note.   Review of labs:   Lab Results   Component Value Date     08/24/2022    K 3.8 08/24/2022     08/24/2022    CO2 22 08/24/2022    BUN 15 02/21/2024    CREATININE 0.6 02/21/2024    GLUCOSE 149 (H) 08/24/2022    CALCIUM 9.3 08/24/2022    PROT 7.6 08/24/2022    LABALBU 4.2 08/24/2022    BILITOT 0.3 08/24/2022    ALKPHOS 88 08/24/2022    AST 27 08/24/2022    ALT 28 08/24/2022    LABGLOM >60 02/21/2024    GFRAA 56 08/24/2022   Personally reviewed; Posterior fossa MRI normal. Cervical MRI showed C5-6 and C6-7 central disc bulges causing central stenosis and C4-5 and C3-4 facet arthropathy +lateral bulge cause right foraminal stenosis.     Today, the location of pain is neck  and travels into the bilateral shoulders  and the severity is Pain Score:   7 on the visual analog scale where 0 is no pain and 10 is the worst pain in their life.  The quality is tense.   The frequency is constant.      Functional status:   ADL: Self-care  Mobility: independence Device: Straight Cane    Exercise:  stretching and pre's daily  Work history: unemployed  Education level: High School and some technical training  Support system: lives alone.     Red flags: Not present: history of cancer, pain awakening patient from sleep, night sweats, fevers, unintentional weight loss, immunospuression, saddle anesthesia, new bowel or bladder dysfunction, and osteoporosis.       Associated symptoms: Not present: falls, subjective weakness, paresthesias, hematuria, nausea, vomiting

## 2024-03-07 NOTE — TELEPHONE ENCOUNTER
----- Message from Hallie Christensen DO sent at 3/6/2024  6:05 PM EST -----  Test result is abnormal. Please schedule for follow up to discuss results and determine plan.

## 2024-03-07 NOTE — TELEPHONE ENCOUNTER
Called and spoke with the patient and informed her that the MRI posterior fossa was normal and she is scheduled on 3-11-24 to go over results of MRI cervical.  Patient is aware and voiced understanding.

## 2024-03-07 NOTE — TELEPHONE ENCOUNTER
Patient called in to let us know that the 0 copay card does not work for people on Medicare/Medicaid and welfare.

## 2024-03-11 ENCOUNTER — OFFICE VISIT (OUTPATIENT)
Dept: PHYSICAL MEDICINE AND REHAB | Age: 59
End: 2024-03-11
Payer: MEDICAID

## 2024-03-11 VITALS
HEIGHT: 61 IN | WEIGHT: 260 LBS | SYSTOLIC BLOOD PRESSURE: 146 MMHG | DIASTOLIC BLOOD PRESSURE: 80 MMHG | HEART RATE: 92 BPM | BODY MASS INDEX: 49.09 KG/M2

## 2024-03-11 DIAGNOSIS — M54.2 CHRONIC NECK PAIN: Primary | ICD-10-CM

## 2024-03-11 DIAGNOSIS — M48.02 CERVICAL STENOSIS OF SPINAL CANAL: ICD-10-CM

## 2024-03-11 DIAGNOSIS — M47.812 SPONDYLOSIS, CERVICAL: ICD-10-CM

## 2024-03-11 DIAGNOSIS — G43.719 INTRACTABLE CHRONIC MIGRAINE WITHOUT AURA AND WITHOUT STATUS MIGRAINOSUS: ICD-10-CM

## 2024-03-11 DIAGNOSIS — R51.9 CHRONIC DAILY HEADACHE: ICD-10-CM

## 2024-03-11 DIAGNOSIS — G89.29 CHRONIC NECK PAIN: Primary | ICD-10-CM

## 2024-03-11 PROCEDURE — 99214 OFFICE O/P EST MOD 30 MIN: CPT | Performed by: PHYSICAL MEDICINE & REHABILITATION

## 2024-03-11 PROCEDURE — 3077F SYST BP >= 140 MM HG: CPT | Performed by: PHYSICAL MEDICINE & REHABILITATION

## 2024-03-11 PROCEDURE — 3079F DIAST BP 80-89 MM HG: CPT | Performed by: PHYSICAL MEDICINE & REHABILITATION

## 2024-03-11 RX ORDER — BLOOD SUGAR DIAGNOSTIC
STRIP MISCELLANEOUS
COMMUNITY
Start: 2024-02-28

## 2024-03-11 RX ORDER — IPRATROPIUM BROMIDE AND ALBUTEROL SULFATE 2.5; .5 MG/3ML; MG/3ML
SOLUTION RESPIRATORY (INHALATION)
COMMUNITY
Start: 2024-02-20

## 2024-03-11 RX ORDER — LANCETS 33 GAUGE
EACH MISCELLANEOUS
COMMUNITY
Start: 2024-02-28

## 2024-03-12 ENCOUNTER — TELEPHONE (OUTPATIENT)
Dept: PHYSICAL MEDICINE AND REHAB | Age: 59
End: 2024-03-12

## 2024-03-27 ENCOUNTER — TELEPHONE (OUTPATIENT)
Dept: PHYSICAL MEDICINE AND REHAB | Age: 59
End: 2024-03-27

## 2024-03-27 NOTE — TELEPHONE ENCOUNTER
Patient notified to take medication as needed and to call as needed. No questions voiced and verbalized understanding

## 2024-03-27 NOTE — TELEPHONE ENCOUNTER
Patient called in questioning if Nutec approved. Bowen Molina was approved for 8 tablets only after [place] did appeal. She wants to know should she only take as needed than what was originally prescribed as daily .  Please advise

## 2024-05-07 RX ORDER — DILTIAZEM HYDROCHLORIDE 240 MG/1
240 CAPSULE, COATED, EXTENDED RELEASE ORAL
COMMUNITY
Start: 2024-03-27

## 2024-05-13 NOTE — ED PROVIDER NOTES
Left message on machine to call back    
Patient's daughter called to request Empagliflozin 25 MG TABS  medication be sent to a different pharmacy. She states her mother stays with her from time to time and it is much cheaper for the patient to get it from this pharmacy.     [facility]  [address] [place] [id number]  (P) [phone]  (F) [phone]    Please call patient's daughter Melody at [phone] with any further advise.  
Psychiatric/Behavioral: Negative for confusion. Physical Exam   Constitutional: She is oriented to person, place, and time. She appears well-developed and well-nourished. No distress. HENT:   Head: Normocephalic and atraumatic. Mouth/Throat: No oropharyngeal exudate. Eyes: Pupils are equal, round, and reactive to light. Right eye exhibits no discharge. Left eye exhibits no discharge. No scleral icterus. Neck: Normal range of motion. Neck supple. No JVD present. No thyromegaly present. Cardiovascular: Normal rate and intact distal pulses. Exam reveals no gallop and no friction rub. No murmur heard. Pulmonary/Chest: Effort normal. No stridor. No respiratory distress. She has no wheezes. She has no rales. She exhibits no tenderness. Abdominal: Soft. She exhibits no distension and no mass. There is tenderness. There is no rebound and no guarding. No hernia. Diffuse abdominal tenderness. Worsening towards the anterior aspect of the abdominal wall palpation. Patient rates it as mild. Musculoskeletal: Normal range of motion. She exhibits no edema, tenderness or deformity. Lymphadenopathy:     She has no cervical adenopathy. Neurological: She is alert and oriented to person, place, and time. No cranial nerve deficit. Skin: Skin is warm and dry. Capillary refill takes less than 2 seconds. No rash noted. She is not diaphoretic. No erythema. No pallor. Psychiatric: She has a normal mood and affect. Her behavior is normal.   Nursing note and vitals reviewed. Procedures    [facility]              --------------------------------------------- PAST HISTORY ---------------------------------------------  Past Medical History:  has a past medical history of Anxiety; Arthritis; Asthma; Chronic headaches; Chronic pain; Depression; GERD (gastroesophageal reflux disease); History of cardiovascular stress test; Hypertension;  Incontinence in female; Irritable bowel syndrome; Neuromuscular disorder ([facility] 75.);

## 2024-05-15 ENCOUNTER — HOSPITAL ENCOUNTER (OUTPATIENT)
Age: 59
Setting detail: OUTPATIENT SURGERY
Discharge: HOME | End: 2024-05-15
Attending: ANESTHESIOLOGY | Admitting: ANESTHESIOLOGY
Payer: MEDICAID

## 2024-05-15 VITALS
OXYGEN SATURATION: 92 % | SYSTOLIC BLOOD PRESSURE: 153 MMHG | DIASTOLIC BLOOD PRESSURE: 76 MMHG | RESPIRATION RATE: 20 BRPM | HEIGHT: 61 IN | WEIGHT: 260 LBS | BODY MASS INDEX: 49.09 KG/M2 | TEMPERATURE: 97.6 F | HEART RATE: 99 BPM

## 2024-05-15 PROBLEM — M50.30 DDD (DEGENERATIVE DISC DISEASE), CERVICAL: Chronic | Status: ACTIVE | Noted: 2024-05-15

## 2024-05-15 PROBLEM — M54.12 CERVICAL RADICULOPATHY: Chronic | Status: ACTIVE | Noted: 2024-05-15

## 2024-05-15 PROBLEM — M48.02 NEURAL FORAMINAL STENOSIS OF CERVICAL SPINE: Chronic | Status: ACTIVE | Noted: 2024-05-15

## 2024-05-15 PROBLEM — M50.20 PROTRUDED CERVICAL DISC: Chronic | Status: ACTIVE | Noted: 2024-05-15

## 2024-05-15 PROCEDURE — 2580000003 HC RX 258: Performed by: ANESTHESIOLOGY

## 2024-05-15 PROCEDURE — 6360000002 HC RX W HCPCS: Performed by: ANESTHESIOLOGY

## 2024-05-15 PROCEDURE — 6360000004 HC RX CONTRAST MEDICATION: Performed by: ANESTHESIOLOGY

## 2024-05-15 PROCEDURE — 2709999900 HC NON-CHARGEABLE SUPPLY: Performed by: ANESTHESIOLOGY

## 2024-05-15 PROCEDURE — 3600000005 HC SURGERY LEVEL 5 BASE: Performed by: ANESTHESIOLOGY

## 2024-05-15 PROCEDURE — 7100000011 HC PHASE II RECOVERY - ADDTL 15 MIN: Performed by: ANESTHESIOLOGY

## 2024-05-15 PROCEDURE — A4216 STERILE WATER/SALINE, 10 ML: HCPCS | Performed by: ANESTHESIOLOGY

## 2024-05-15 PROCEDURE — 2500000003 HC RX 250 WO HCPCS: Performed by: ANESTHESIOLOGY

## 2024-05-15 PROCEDURE — 7100000010 HC PHASE II RECOVERY - FIRST 15 MIN: Performed by: ANESTHESIOLOGY

## 2024-05-15 RX ORDER — LIDOCAINE HYDROCHLORIDE 10 MG/ML
INJECTION, SOLUTION EPIDURAL; INFILTRATION; INTRACAUDAL; PERINEURAL PRN
Status: DISCONTINUED | OUTPATIENT
Start: 2024-05-15 | End: 2024-05-15 | Stop reason: ALTCHOICE

## 2024-05-15 RX ORDER — EMPAGLIFLOZIN 10 MG/1
10 TABLET, FILM COATED ORAL DAILY
COMMUNITY
Start: 2024-04-27

## 2024-05-15 RX ORDER — LORAZEPAM 0.5 MG/1
0.5 TABLET ORAL ONCE
COMMUNITY
Start: 2024-02-15

## 2024-05-15 ASSESSMENT — PAIN DESCRIPTION - DESCRIPTORS: DESCRIPTORS: ACHING;DISCOMFORT

## 2024-05-15 ASSESSMENT — PAIN - FUNCTIONAL ASSESSMENT
PAIN_FUNCTIONAL_ASSESSMENT: 0-10
PAIN_FUNCTIONAL_ASSESSMENT: 0-10

## 2024-05-15 NOTE — TELEPHONE ENCOUNTER
.  Last appointment 1/4/2022  Next appointment   Future Appointments   Date Time Provider Violeta Leola   3/31/2022 10:30 AM DO Will Alfonso [facility]   6/8/2022  2:00 PM Libra Moreno MD [facility]      Last refill:  03/16/2022  DOS: 02/16/2022      Patient called in requesting refill of:    oxyCODONE-acetaminophen (PERCOCET)  MG per tablet       [address] -  [phone] [facility] [phone]   [address] [place] [address]
15-May-2024

## 2024-05-15 NOTE — DISCHARGE INSTRUCTIONS
Post-op instruction Esperanza MASSEY  [phone] ([place])  [phone] ([place])      Rest 12-24 hours following procedure. DO NOT DRIVE till the following day.    Keep dressing clean and dry. Do not bathe, shower, or sit in hot tub the day of procedure .You may remove the dressing following A.M.    You may return to work/school tomorrow.     Drink extra fluids for the next 24 hours.    Resume your regular diet.    (ONLY IF TAKING)-Resume previously prescribed medications. Resume Coumadin, Plavix, NSAIDS, Ibuprofen products 24 hours after procedure.    You need to have a responsible adult stay with you this evening.    If you experience any discomfort relating to this procedure, you may take Tylenol 2 tablets every 4 hrs as needed for pain and/or apply ice to the affected area.    If you experience any unusual symptoms or problems, call your physician’s answering service at [phone] or go directly to the nearest Emergency Department.    10. If you are diabetic, your blood sugar may be elevated for several days from         Medication used during the procedure.    11. If you do not have any further procedures scheduled, please call for a follow           up appointment at [facility]. [phone]             Infection After Surgery: Care Instructions  Overview  After surgery, an infection is always possible. It doesn't mean that the surgery didn't go well.  Because an infection can be serious, your doctor has taken steps to manage it.  Your doctor checked the infection and cleaned it if necessary. Your doctor may have made an opening in the area so that the pus can drain out. You may have gauze in the cut so that the area will stay open and keep draining. You may need antibiotics.  You will need to follow up with your doctor to make sure the infection has gone away.  Follow-up care is a key part of your treatment and safety. Be sure to make and go to all appointments, and call your doctor if you

## 2024-05-15 NOTE — OP NOTE
Sri Lux    5/15/2024    Preoperative Diagnoses: Cervical Radiculopathy , Neural foraminal Stenosis Cervical Spine, Protruding/ DDD Cervical Spine     Postoperative Diagnoses: Same     Procedure Performed: Therapeutic Right cervical transforaminal epidural done under fluoroscopic guidance. # 1     Anesthesia: Local     Brief History:  Sri Lux comes in today to [facility] for the first therapeutic cervical transforaminal epidural.  She was explained all of this in great detail, including potential complications, and she did request the we proceed.  Her complete History & Physical examination was reviewed and it is unchanged.      Description of Procedure:    Sri was taken to the procedure room at [facility] where with the assistance of a nurse, she was placed in the supine position. The  cervical area was prepped and draped in the usual manner. A time-out was performed and confirmed the patient’s name, date of birth, the procedure to be performed and skin marked for appropriate site and side of procedure. All questions and concerns were answered and the patient requested we proceed. A #27 gauge ½ inch local needle using Lidocaine 1%  5ml. was used for local skin wheal.   A #22-gauge 3 ½ inch disposable BD spinal needle was introduced paraspinally under direct fluoroscopic guidance with three-dimensional guidance to the cephalad-most portion of the neuroforamen of C3-4, C4-5 in sequential order. After 1 ml of Omnipaque 240 dye was used to confirm that the tip of the needle was in the epidural space and not subarachnoid or intravascular with a negative aspiration test. Nine ml of 0.9% Normal Saline  (PF) mixed with Dexamethasone 10 mg. 1 ml. was injected at each of the three levels.   Following completion of the same it was clear to see under direct fluoroscopic guidance that there was improvement in the neuroforminal stenosis and flow of dye down the

## 2024-05-15 NOTE — H&P
Update History & Physical    The patient's History and Physical of 04/18/2024 was reviewed with the patient and there were no significant changes.    I examined the patient and there were no significant changes from the previous History and Physical.    Plan: The risk, benefits, expected outcome, and alternative to the recommended procedure have been discussed with the patient.  Patient understands and wants to proceed with the procedure.      Electronically signed by Flavia Yuan DO on 5/15/24 at 9:04 AM EDT

## 2024-05-22 ENCOUNTER — HOSPITAL ENCOUNTER (OUTPATIENT)
Dept: OPERATING ROOM | Age: 59
Setting detail: OUTPATIENT SURGERY
Discharge: HOME | End: 2024-05-22
Attending: ANESTHESIOLOGY
Payer: MEDICAID

## 2024-05-22 ENCOUNTER — HOSPITAL ENCOUNTER (OUTPATIENT)
Age: 59
Setting detail: OUTPATIENT SURGERY
Discharge: HOME | End: 2024-05-22
Attending: ANESTHESIOLOGY | Admitting: ANESTHESIOLOGY
Payer: MEDICAID

## 2024-05-22 VITALS
OXYGEN SATURATION: 93 % | SYSTOLIC BLOOD PRESSURE: 105 MMHG | WEIGHT: 260 LBS | HEIGHT: 61 IN | RESPIRATION RATE: 16 BRPM | HEART RATE: 73 BPM | TEMPERATURE: 98.1 F | BODY MASS INDEX: 49.09 KG/M2 | DIASTOLIC BLOOD PRESSURE: 60 MMHG

## 2024-05-22 DIAGNOSIS — M47.892 OTHER OSTEOARTHRITIS OF SPINE, CERVICAL REGION: ICD-10-CM

## 2024-05-22 PROCEDURE — 6360000002 HC RX W HCPCS: Performed by: ANESTHESIOLOGY

## 2024-05-22 PROCEDURE — 3600000005 HC SURGERY LEVEL 5 BASE: Performed by: ANESTHESIOLOGY

## 2024-05-22 PROCEDURE — 7100000011 HC PHASE II RECOVERY - ADDTL 15 MIN: Performed by: ANESTHESIOLOGY

## 2024-05-22 PROCEDURE — 6360000004 HC RX CONTRAST MEDICATION: Performed by: ANESTHESIOLOGY

## 2024-05-22 PROCEDURE — 7100000010 HC PHASE II RECOVERY - FIRST 15 MIN: Performed by: ANESTHESIOLOGY

## 2024-05-22 PROCEDURE — 2500000003 HC RX 250 WO HCPCS: Performed by: ANESTHESIOLOGY

## 2024-05-22 PROCEDURE — 2580000003 HC RX 258: Performed by: ANESTHESIOLOGY

## 2024-05-22 PROCEDURE — 2709999900 HC NON-CHARGEABLE SUPPLY: Performed by: ANESTHESIOLOGY

## 2024-05-22 PROCEDURE — A4216 STERILE WATER/SALINE, 10 ML: HCPCS | Performed by: ANESTHESIOLOGY

## 2024-05-22 RX ORDER — LIDOCAINE HYDROCHLORIDE 10 MG/ML
INJECTION, SOLUTION EPIDURAL; INFILTRATION; INTRACAUDAL; PERINEURAL PRN
Status: DISCONTINUED | OUTPATIENT
Start: 2024-05-22 | End: 2024-05-22 | Stop reason: ALTCHOICE

## 2024-05-22 ASSESSMENT — PAIN - FUNCTIONAL ASSESSMENT
PAIN_FUNCTIONAL_ASSESSMENT: 0-10
PAIN_FUNCTIONAL_ASSESSMENT: 0-10

## 2024-05-22 ASSESSMENT — PAIN DESCRIPTION - DESCRIPTORS
DESCRIPTORS: ACHING
DESCRIPTORS: ACHING

## 2024-05-22 NOTE — H&P
Update History & Physical    The patient's History and Physical of 05/21/2024 was reviewed with the patient and there were no significant changes.    I examined the patient and there were no significant changes from the previous History and Physical.    Plan: The risk, benefits, expected outcome, and alternative to the recommended procedure have been discussed with the patient.  Patient understands and wants to proceed with the procedure.      Electronically signed by Flavia Yuan DO on 5/22/24 at 9:24 AM EDT

## 2024-05-22 NOTE — OP NOTE
Sri Lux    5/22/2024    Preoperative Diagnoses: Cervical Radiculopathy , Neural foraminal Stenosis Cervical Spine, Protruding/ DDD Cervical Spine     Postoperative Diagnoses: Same     Procedure Performed: Therapeutic Right cervical transforaminal epidural done under fluoroscopic guidance. # 2     Anesthesia: Local     Brief History:  Sri Lux comes in today to [facility] for the second therapeutic cervical transforaminal epidural.  She was explained all of this in great detail, including potential complications, and she did request the we proceed.  Her complete History & Physical examination was reviewed and it is unchanged.Patient states received 70% pain relief following last procedure.      Description of Procedure:    Sri was taken to the procedure room at [facility] where with the assistance of a nurse, she was placed in the supine position. The  cervical area was prepped and draped in the usual manner. A time-out was performed and confirmed the patient’s name, date of birth, the procedure to be performed and skin marked for appropriate site and side of procedure. All questions and concerns were answered and the patient requested we proceed. A #27 gauge ½ inch local needle using Lidocaine 1%  5ml. was used for local skin wheal.   A #22-gauge 3 ½ inch disposable BD spinal needle was introduced paraspinally under direct fluoroscopic guidance with three-dimensional guidance to the cephalad-most portion of the neuroforamen of C3-4, C4-5 in sequential order. After 1 ml of Omnipaque 240 dye was used to confirm that the tip of the needle was in the epidural space and not subarachnoid or intravascular with a negative aspiration test. Nine ml of 0.9% Normal Saline  (PF) mixed with Dexamethasone 10 mg. 1 ml. was injected at each of the three levels.   Following completion of the same it was clear to see under direct fluoroscopic guidance that there was

## 2024-05-22 NOTE — DISCHARGE INSTRUCTIONS
Post-op instruction Esperanza MASSEY  [phone] ([place])  [phone] ([place])      Rest 12-24 hours following procedure. DO NOT DRIVE till the following day.    Keep dressing clean and dry. Do not bathe, shower, or sit in hot tub the day of procedure .You may remove the dressing following A.M.    You may return to work/school tomorrow.     Drink extra fluids for the next 24 hours.    Resume your regular diet.    (ONLY IF TAKING)-Resume previously prescribed medications. Resume Coumadin, Plavix, NSAIDS, Ibuprofen products 24 hours after procedure.    You need to have a responsible adult stay with you this evening.    If you experience any discomfort relating to this procedure, you may take Tylenol 2 tablets every 4 hrs as needed for pain and/or apply ice to the affected area.    If you experience any unusual symptoms or problems, call your physician’s answering service at [phone] or go directly to the nearest Emergency Department.    10. If you are diabetic, your blood sugar may be elevated for several days from         Medication used during the procedure.    11. If you do not have any further procedures scheduled, please call for a follow           up appointment at [facility]. [phone]

## 2024-05-23 ENCOUNTER — TELEPHONE (OUTPATIENT)
Dept: PHYSICAL MEDICINE AND REHAB | Age: 59
End: 2024-05-23

## 2024-05-23 NOTE — TELEPHONE ENCOUNTER
Saw Dr. Alvarado had 2 injections in the neck and has had good relief and follows up with him in a month. And the Nurtec is working.

## 2024-06-26 RX ORDER — FLUTICASONE PROPIONATE 50 MCG
1 SPRAY, SUSPENSION (ML) NASAL DAILY
COMMUNITY
Start: 2024-04-01

## 2024-07-09 PROBLEM — M51.37 DISC DEGENERATION, LUMBOSACRAL: Chronic | Status: ACTIVE | Noted: 2024-07-09

## 2024-07-09 PROBLEM — M51.379 DISC DEGENERATION, LUMBOSACRAL: Chronic | Status: ACTIVE | Noted: 2024-07-09

## 2024-07-09 PROBLEM — M47.818 SACRAL SPONDYLOSIS: Chronic | Status: ACTIVE | Noted: 2024-07-09

## 2024-07-10 ENCOUNTER — HOSPITAL ENCOUNTER (OUTPATIENT)
Dept: OPERATING ROOM | Age: 59
Setting detail: OUTPATIENT SURGERY
Discharge: HOME | End: 2024-07-10
Attending: ANESTHESIOLOGY
Payer: MEDICAID

## 2024-07-10 ENCOUNTER — HOSPITAL ENCOUNTER (OUTPATIENT)
Age: 59
Setting detail: OUTPATIENT SURGERY
Discharge: HOME | End: 2024-07-10
Attending: ANESTHESIOLOGY | Admitting: ANESTHESIOLOGY
Payer: MEDICAID

## 2024-07-10 VITALS
HEART RATE: 58 BPM | HEIGHT: 61 IN | BODY MASS INDEX: 48.15 KG/M2 | RESPIRATION RATE: 20 BRPM | OXYGEN SATURATION: 92 % | SYSTOLIC BLOOD PRESSURE: 103 MMHG | DIASTOLIC BLOOD PRESSURE: 55 MMHG | TEMPERATURE: 97.4 F | WEIGHT: 255 LBS

## 2024-07-10 DIAGNOSIS — M51.26 OTHER INTERVERTEBRAL DISC DISPLACEMENT, LUMBAR REGION: ICD-10-CM

## 2024-07-10 PROCEDURE — 7100000011 HC PHASE II RECOVERY - ADDTL 15 MIN: Performed by: ANESTHESIOLOGY

## 2024-07-10 PROCEDURE — 2500000003 HC RX 250 WO HCPCS: Performed by: ANESTHESIOLOGY

## 2024-07-10 PROCEDURE — 3600000005 HC SURGERY LEVEL 5 BASE: Performed by: ANESTHESIOLOGY

## 2024-07-10 PROCEDURE — 2709999900 HC NON-CHARGEABLE SUPPLY: Performed by: ANESTHESIOLOGY

## 2024-07-10 PROCEDURE — 6360000002 HC RX W HCPCS: Performed by: ANESTHESIOLOGY

## 2024-07-10 PROCEDURE — 7100000010 HC PHASE II RECOVERY - FIRST 15 MIN: Performed by: ANESTHESIOLOGY

## 2024-07-10 RX ORDER — LIDOCAINE HYDROCHLORIDE 10 MG/ML
INJECTION, SOLUTION EPIDURAL; INFILTRATION; INTRACAUDAL; PERINEURAL PRN
Status: DISCONTINUED | OUTPATIENT
Start: 2024-07-10 | End: 2024-07-10 | Stop reason: ALTCHOICE

## 2024-07-10 ASSESSMENT — PAIN DESCRIPTION - DESCRIPTORS
DESCRIPTORS: ACHING
DESCRIPTORS: ACHING
DESCRIPTORS: ACHING;DISCOMFORT
DESCRIPTORS: ACHING

## 2024-07-10 ASSESSMENT — PAIN - FUNCTIONAL ASSESSMENT
PAIN_FUNCTIONAL_ASSESSMENT: 0-10

## 2024-07-10 NOTE — OP NOTE
Sri Lux    7/10/2024    Pre-Op Diagnosis:        Left sacroiliitis, sacral spondylosis, lumbosacral disc degeneration    Post-Op Diagnosis:       Same    Procedure Performed:  #1 Diagnostic Left sacroiliac nerve block S1, S2, S3 under direct fluoroscopic guidance    Anesthesia: Local    Surgeon:                       Flavia Yuan D.O.    BRIEF HISTORY: Sri comes in today, 7/10/2024, to the [facility]  for a #1, Left sacroiliac nerve block.    She was again explained this procedure in detail, including potential complications and did again request we proceed.    The patient’s complete History & Physical examination was reviewed with him in depth.  It is unchanged.     Sri has had chronic pain in the Left sacroiliac area despite multiple attempts at conservative OP medical management including PT, NSAIDS< mild-to-moderate analgesics, muscle relaxants, TENS, Manipulation, etc.  Physical examination shows the patient to have dramatic muscle spasm, trigger point activities and decreased ROM in the sacroiliac area with pain, with palpation directly over this area easily reproducing her pain---classic for Left sacroiliac neuritis.    PROCEDURE: Sri was brought to the procedure area of the [facility] and placed in the prone position.  The Left sacroiliac area was prepped and draped  in the usual sterile manner. A time-out was performed and confirmed the patient’s name, date of birth, the procedure to be performed and skin marked for appropriate site and side of procedure. All questions and concerns were answered and the patient requested we proceed.      Following this with fluoroscopic guidance with a medial to lateral approach, we were able to identify the sacroiliac nerves and then placed Lidocaine 1% 5 ml for the local skin wheal, followed by a #22-gauge 3 ½ inch disposable BD spinal needle down into the sacroiliac nerves themselves under direct fluoroscopic

## 2024-07-10 NOTE — DISCHARGE INSTRUCTIONS
Post-op instruction Esperanza MASSEY  [phone] ([place])  [phone] ([place])      Rest 12-24 hours following procedure. DO NOT DRIVE till the following day.    Keep dressing clean and dry. Do not bathe, shower, or sit in hot tub the day of procedure .You may remove the dressing following A.M.    You may return to work/school tomorrow.     Drink extra fluids for the next 24 hours.    Resume your regular diet.    (ONLY IF TAKING)-Resume previously prescribed medications. Resume Coumadin, Plavix, NSAIDS, Ibuprofen products 24 hours after procedure.    You need to have a responsible adult stay with you this evening.    If you experience any discomfort relating to this procedure, you may take Tylenol 2 tablets every 4 hrs as needed for pain and/or apply ice to the affected area.    If you experience any unusual symptoms or problems, call your physician’s answering service at [phone] or go directly to the nearest Emergency Department.    10. If you are diabetic, your blood sugar may be elevated for several days from         Medication used during the procedure.    11. If you do not have any further procedures scheduled, please call for a follow           up appointment at [facility]. [phone]

## 2024-07-10 NOTE — H&P
Update History & Physical    The patient's History and Physical of 06/24/2024 was reviewed with the patient and there were no significant changes.    I examined the patient and there were no significant changes from the previous History and Physical.    Plan: The risk, benefits, expected outcome, and alternative to the recommended procedure have been discussed with the patient.  Patient understands and wants to proceed with the procedure.      Electronically signed by Flavia Yuan DO on 7/10/24 at 10:17 AM EDT

## 2024-09-28 NOTE — TELEPHONE ENCOUNTER
----- Message from Hallie Christensen DO sent at 3/11/2024  6:25 PM EDT -----  Please call Dr. An and explain situation to them. Patient had an uncontrolled mental health condition, got in argument with [facility] pain staff now no one there will see here. Under mental health treatment, stable now trying to establish for interventions not meds.  Let them know the patient is doing well and stable mentally now. She has been a good patient for us.   
Called and left message on [facility] to call office back.  Will wait for return call from patient.    
0

## 2025-03-05 NOTE — PROGRESS NOTES
lateral flexion is 30 degrees and in right lateral flexion is 30 degrees.  There is no crepitus.  bilateral Shoulder: No edema, erythema, ecchymosis, effusion, instability, mass or deformity. Full painless ROM bilateral shoulders. No painful arc.  No crepitus. No tenderness to palpation at the acromioclavicular joint, subacromial space or biceps tendon insertion.  Negative Adryan-Joseph, Scarf,  Drop arm, and Speeds.    Neurologic: Neurologic: Awake, alert and oriented in three planes.    No facial weakness.  Tongue is midline.  Palate elevates symmetrically. Hearing is intact for conversation.  Pupils are equal and round and react to light and acoommodation. Extraocular muscles are intact. Blurring and double vision with testing of extraocular reflex both horizontally and vertically.  Shoulder shrug is symmetric. Speech is fluent. Sensation: Intact for light touch and pin prick in all upper and lower extremity dermatomes.  Intact in the upper and lower extremities for temperature.  Intact at the first Metatarsal-phalangeal joint in bilateral feet and in the first carpal-metacarpal joint in the bilateral hands for vibration.  There is no hyperalgesia or allodynia. Strength: 5/5 in all myotomes in the upper and lower extremities.  Muscle Tendon Reflexes: 3+ symmetric in the bilateral upper and lower extremities. Babinski is upgoing on right, down on left.  De La O is negative bilaterally. Coordination in the upper and lower extremities is normal.  Gait is unsteady.   Romberg is positive.  Heel and toe walk are unable.  Tandem walk is unable.  No clonus or spasticity.  The patient was able to rise from a chair and squat without difficulty. There is no tremor.        Impression:   1. Chronic daily headache    2. Migraine with aura and without status migrainosus, not intractable          The patient's condition is unstable and currently Acute.   Prognosis: Good    Plan:   Medications reviewed, continue current.

## 2025-03-17 ENCOUNTER — OFFICE VISIT (OUTPATIENT)
Dept: PHYSICAL MEDICINE AND REHAB | Age: 60
End: 2025-03-17
Payer: MEDICAID

## 2025-03-17 VITALS
HEART RATE: 100 BPM | HEIGHT: 61 IN | SYSTOLIC BLOOD PRESSURE: 132 MMHG | WEIGHT: 247 LBS | BODY MASS INDEX: 46.63 KG/M2 | DIASTOLIC BLOOD PRESSURE: 87 MMHG

## 2025-03-17 DIAGNOSIS — G43.109 MIGRAINE WITH AURA AND WITHOUT STATUS MIGRAINOSUS, NOT INTRACTABLE: ICD-10-CM

## 2025-03-17 DIAGNOSIS — R51.9 CHRONIC DAILY HEADACHE: Primary | ICD-10-CM

## 2025-03-17 PROCEDURE — 99214 OFFICE O/P EST MOD 30 MIN: CPT | Performed by: PHYSICAL MEDICINE & REHABILITATION

## 2025-03-17 PROCEDURE — 3075F SYST BP GE 130 - 139MM HG: CPT | Performed by: PHYSICAL MEDICINE & REHABILITATION

## 2025-03-17 PROCEDURE — 3079F DIAST BP 80-89 MM HG: CPT | Performed by: PHYSICAL MEDICINE & REHABILITATION

## 2025-03-17 RX ORDER — RIMEGEPANT SULFATE 75 MG/75MG
75 TABLET, ORALLY DISINTEGRATING ORAL DAILY PRN
Qty: 8 TABLET | Refills: 11 | Status: SHIPPED | OUTPATIENT
Start: 2025-03-17

## 2025-03-17 RX ORDER — LIRAGLUTIDE 6 MG/ML
1.2 INJECTION SUBCUTANEOUS DAILY
COMMUNITY

## 2025-03-31 ENCOUNTER — TRANSCRIBE ORDERS (OUTPATIENT)
Dept: ADMINISTRATIVE | Age: 60
End: 2025-03-31

## 2025-03-31 DIAGNOSIS — J45.40 MODERATE PERSISTENT ASTHMA WITHOUT COMPLICATION: Primary | ICD-10-CM

## (undated) DEVICE — GAUZE,SPONGE,4"X4",12PLY,STERILE,LF,2'S: Brand: MEDLINE

## (undated) DEVICE — TUBING PMP L16FT MAIN DISP FOR AR-6400 AR-6475

## (undated) DEVICE — 3M™ RED DOT™ MONITORING ELECTRODE WITH FOAM TAPE AND STICKY GEL 2560, 50/BAG, 20/CASE, 72/PLT: Brand: RED DOT™

## (undated) DEVICE — PAD,EYE,1-5/8X2 5/8,STERILE,LF,1/PK: Brand: MEDLINE

## (undated) DEVICE — DRESSING GZ XRFRM 4X4(25/BX 6BX/CS)

## (undated) DEVICE — COUNTER NDL 10 COUNT HLD 20 FOAM BLK SGL MAG

## (undated) DEVICE — 6 ML SYRINGE LUER-LOCK TIP: Brand: MONOJECT

## (undated) DEVICE — NEEDLE HYPO 18GA L1.5IN PNK POLYPR HUB S STL THN WALL FILL

## (undated) DEVICE — GOWN SURG XL LNG LEN SPUNBOND REINF VELC TIE LEV 4 IMPERV

## (undated) DEVICE — NEEDLE HYPO 25GA L1.5IN BLU POLYPR HUB S STL REG BVL STR

## (undated) DEVICE — DRAPE,SHOULDER,ORTHOMAX,W/POUCH,5/CS: Brand: MEDLINE

## (undated) DEVICE — 22GX5" WHITACRE SPINAL NEEDLE: Brand: MEDLINE

## (undated) DEVICE — INTENDED FOR TISSUE SEPARATION, AND OTHER PROCEDURES THAT REQUIRE A SHARP SURGICAL BLADE TO PUNCTURE OR CUT.: Brand: BARD-PARKER ® STAINLESS STEEL BLADES

## (undated) DEVICE — 12 ML SYRINGE,LUER-LOCK TIP: Brand: MONOJECT

## (undated) DEVICE — BANDAGE ADH W1XL3IN NAT FAB WVN FLX DURABLE N ADH PD SEAL

## (undated) DEVICE — MARKER,SKIN,WI/RULER AND LABELS: Brand: MEDLINE

## (undated) DEVICE — 3 ML SYRINGE LUER-LOCK TIP: Brand: MONOJECT

## (undated) DEVICE — GOWN,SIRUS,FABRNF,XL,20/CS: Brand: MEDLINE

## (undated) DEVICE — STANDARD HYPODERMIC NEEDLE,POLYPROPYLENE HUB: Brand: MONOJECT

## (undated) DEVICE — ELECTRODE SURG MPLR NEUT SELF ADH PT PLT MULTIGEN

## (undated) DEVICE — BLADE SAW AGRSV + CUT 4MM

## (undated) DEVICE — GARMENT COMPR STD FOR 17IN CALF UNIF THER FLOTRN

## (undated) DEVICE — 5-IN-1 BARBED CONNECTOR POLYPROPYLENE 3/16 - 9/16 IN. (5 - 14.3 MM): Brand: ARGYLE

## (undated) DEVICE — NEEDLE SPNL L3.5IN PNK HUB S STL REG WALL FIT STYL W/ QNCKE

## (undated) DEVICE — COVER,TABLE,60X90,STERILE: Brand: MEDLINE

## (undated) DEVICE — TOWEL OR BLUEE 16X26IN ST 8 PACK ORB08 16X26ORTWL

## (undated) DEVICE — BLADE SHV L13CM DIA4MM DBL CUT COOLCUT

## (undated) DEVICE — CVD CANNULA

## (undated) DEVICE — SOLUTION SURG PREP ANTIMICROBIAL 4 OZ SKIN WND EXIDINE

## (undated) DEVICE — NEEDLE SPNL 22GA L5IN BLK HUB S STL W/ QNCKE PNT W/OUT

## (undated) DEVICE — APPLICATOR MEDICATED 10.5 CC SOLUTION HI LT ORNG CHLORAPREP

## (undated) DEVICE — GLOVE SURG SZ 8 L12IN FNGR THK94MIL STD WHT LTX FREE

## (undated) DEVICE — CHLORAPREP 26ML ORANGE

## (undated) DEVICE — CONMED DISPOSABLE MICROBIOLOGY BRUSH, Ø1 MM, 1.8 MM WORKING DIAMETER, 110 CM LENGTH: Brand: CONMED

## (undated) DEVICE — ELECTRODE PT RET AD L9FT HI MOIST COND ADH HYDRGEL CORDED

## (undated) DEVICE — SOLUTION IV IRRIG POUR BRL 0.9% SODIUM CHL 2F7124

## (undated) DEVICE — 6 X 9  1.75MIL 4-WALL LABGUARD: Brand: MINIGRIP COMMERCIAL LLC

## (undated) DEVICE — 3M™ STERI-DRAPE™ U-DRAPE, LONG 1019: Brand: STERI-DRAPE™

## (undated) DEVICE — Z DISCONTINUED GLOVE SURG SZ 7.5 L12IN FNGR THK13MIL WHT ISOLEX

## (undated) DEVICE — PEN: MARKING STD 100/CS: Brand: MEDICAL ACTION INDUSTRIES

## (undated) DEVICE — TRAY ENDO ROOM BRONCH

## (undated) DEVICE — SUTURE PROL SZ 3-0 L18IN NONABSORBABLE BLU L19MM PS-2 3/8 8687H

## (undated) DEVICE — NEEDLE SPNL 22GA L3.5IN BLK HUB S STL REG WALL FIT STYL

## (undated) DEVICE — SHEET,DRAPE,40X58,STERILE: Brand: MEDLINE

## (undated) DEVICE — STERILE POLYISOPRENE POWDER-FREE SURGICAL GLOVES: Brand: PROTEXIS

## (undated) DEVICE — GLOVE ORANGE PI 7   MSG9070

## (undated) DEVICE — [AGGRESSIVE 6-FLUTE BARREL BUR, ARTHROSCOPIC SHAVER BLADE,  DO NOT RESTERILIZE,  DO NOT USE IF PACKAGE IS DAMAGED,  KEEP DRY,  KEEP AWAY FROM SUNLIGHT]: Brand: FORMULA

## (undated) DEVICE — CAMERA STRYKER 1488 HD GEN

## (undated) DEVICE — YANKAUER,BULB TIP,W/O VENT,RIGID,STERILE: Brand: MEDLINE

## (undated) DEVICE — KIT BEDSIDE REVITAL OX 500ML

## (undated) DEVICE — GAUZE,SPONGE,4"X4",16PLY,XRAY,STRL,LF: Brand: MEDLINE

## (undated) DEVICE — 1810 FOAM BLOCK NEEDLE COUNTER: Brand: DEVON

## (undated) DEVICE — SLING ARM XL L20IN D75IN WHT POLY MESH ENVELOP MTL SIDE

## (undated) DEVICE — 3M™ MEDIPORE™ SOFT CLOTH TAPE, 4 INCH X 10 YARDS, 12 ROLLS/CASE, 2964: Brand: 3M™ MEDIPORE™

## (undated) DEVICE — 20 ML SYRINGE REGULAR TIP: Brand: MONOJECT

## (undated) DEVICE — TRAP SPEC COLL 40CC MUCOUS CALIB UNIV CONN FOR OPN SUCT

## (undated) DEVICE — SUTURE FIBERTAPE FIBERWIRE SZ 2-0 30IN NONABSORB BLU AR72377

## (undated) DEVICE — GAUZE,SPONGE,4"X4",16PLY,STRL,LF,10/TRAY: Brand: MEDLINE

## (undated) DEVICE — PAD,ABDOMINAL,5"X9",ST,LF,25/BX: Brand: MEDLINE INDUSTRIES, INC.

## (undated) DEVICE — SYRINGE MED 50ML LUERLOCK TIP

## (undated) DEVICE — HYPODERMIC SAFETY NEEDLE: Brand: MAGELLAN

## (undated) DEVICE — SINGLE USE SUCTION VALVE MAJ-209: Brand: SINGLE USE SUCTION VALVE (STERILE)

## (undated) DEVICE — SYRINGE MED 10ML LUERLOCK TIP W/O SFTY DISP

## (undated) DEVICE — APPLICATOR MEDICATED 26 CC SOLUTION HI LT ORNG CHLORAPREP

## (undated) DEVICE — MASK,FACE,MAXFLUIDPROTECT,SHIELD/ERLPS: Brand: MEDLINE

## (undated) DEVICE — BASIC PACK: Brand: CONVERTORS

## (undated) DEVICE — DRAPE SURG W88XL116IN SMS BODY SPL ORTH N FEN REINF FLD PCH

## (undated) DEVICE — SPONGE LAP W18XL18IN WHT COT 4 PLY FLD STRUNG RADPQ DISP ST

## (undated) DEVICE — SET SURG INSTR DISSECT

## (undated) DEVICE — PROBE ABLAT 90DEG ASPIR MULTIPORT BPLR RF 1 PC ELECTRD ERGO

## (undated) DEVICE — TOWEL,OR,DSP,ST,BLUE,STD,6/PK,12PK/CS: Brand: MEDLINE

## (undated) DEVICE — BUR SHV L13CM DIA5.5MM 8 FLUT OVL CLEARCUT COOLCUT

## (undated) DEVICE — NEEDLE SUT PASS FOR ROT CUF LABRAL REP MULTFI SCORPION

## (undated) DEVICE — MEDI-VAC NON-CONDUCTIVE SUCTION TUBING: Brand: CARDINAL HEALTH

## (undated) DEVICE — CANNULA ARTHSCP L5CM ID8MM DBL DAM 1 PC MOLD LO PROF FLNG

## (undated) DEVICE — SUPER TURBOVAC 90 INTEGRATED CABLE WAND ICW: Brand: COBLATION

## (undated) DEVICE — DRESSING GZ W1XL8IN COT XRFRM N ADH OVERWRAP CURAD

## (undated) DEVICE — Z DISCONTINUED APPLICATOR SURG PREP 0.35OZ 2% CHG 70% ISO ALC W/ HI LT

## (undated) DEVICE — SLEEVE TRAC SPANDEX LAT W/ 4IN COBAN SUPERFICIAL RAD NRV PD

## (undated) DEVICE — GARMENT,MEDLINE,DVT,INT,CALF,MED, GEN2: Brand: MEDLINE

## (undated) DEVICE — COVER,LIGHT HANDLE,FLX,1/PK: Brand: MEDLINE INDUSTRIES, INC.

## (undated) DEVICE — LUBRICANT SURG JELLY ST BACTER TUBE 4.25OZ

## (undated) DEVICE — SOLUTION IV IRRIG LACTATED RINGERS 3000ML 2B7487

## (undated) DEVICE — BLADE SAW AGGRESSIVE 5.5 MM CUT

## (undated) DEVICE — NEPTUNE E-SEP SMOKE EVACUATION PENCIL, COATED, 70MM BLADE, PUSH BUTTON SWITCH: Brand: NEPTUNE E-SEP

## (undated) DEVICE — SOLUTION IRRIG 1000ML 09% SOD CHL USP PIC PLAS CONTAINER

## (undated) DEVICE — 3M™ IOBAN™ 2 ANTIMICROBIAL INCISE DRAPE 6640EZ: Brand: IOBAN™ 2

## (undated) DEVICE — TUBING, SUCTION, 1/4" X 10', STRAIGHT: Brand: MEDLINE

## (undated) DEVICE — DOUBLE BASIN SET: Brand: MEDLINE INDUSTRIES, INC.

## (undated) DEVICE — GOWN,SIRUS,POLYRNF,BRTHSLV,XLN/XL,20/CS: Brand: MEDLINE

## (undated) DEVICE — NDL CNTR 40CT FM MAG: Brand: MEDLINE INDUSTRIES, INC.

## (undated) DEVICE — BLOCK BITE 60FR CAREGUARD

## (undated) DEVICE — NEEDLE SPNL 25GA L5IN BLU LNG WHTACR HI FLO PNCL PNT DISP

## (undated) DEVICE — Z DISCONTINUED USE 2275686 GLOVE SURG SZ 8 L12IN FNGR THK13MIL WHT ISOLEX POLYISOPRENE

## (undated) DEVICE — GOWN ISOLATN REG YEL M WT MULTIPLY SIDETIE LEV 2

## (undated) DEVICE — SINGLE USE BIOPSY VALVE MAJ-210: Brand: SINGLE USE BIOPSY VALVE (STERILE)

## (undated) DEVICE — BRUSH CYTO L90CM DIA1MM NONRIGID DISP FOR MICRO SPEC